# Patient Record
Sex: MALE | Race: WHITE | NOT HISPANIC OR LATINO
[De-identification: names, ages, dates, MRNs, and addresses within clinical notes are randomized per-mention and may not be internally consistent; named-entity substitution may affect disease eponyms.]

---

## 2017-01-26 ENCOUNTER — MEDICATION RENEWAL (OUTPATIENT)
Age: 72
End: 2017-01-26

## 2017-01-29 ENCOUNTER — MEDICATION RENEWAL (OUTPATIENT)
Age: 72
End: 2017-01-29

## 2017-02-01 ENCOUNTER — MEDICATION RENEWAL (OUTPATIENT)
Age: 72
End: 2017-02-01

## 2017-02-02 ENCOUNTER — MEDICATION RENEWAL (OUTPATIENT)
Age: 72
End: 2017-02-02

## 2017-02-03 ENCOUNTER — MEDICATION RENEWAL (OUTPATIENT)
Age: 72
End: 2017-02-03

## 2017-04-27 ENCOUNTER — APPOINTMENT (OUTPATIENT)
Dept: CARDIOLOGY | Facility: CLINIC | Age: 72
End: 2017-04-27

## 2017-05-05 ENCOUNTER — NON-APPOINTMENT (OUTPATIENT)
Age: 72
End: 2017-05-05

## 2017-05-05 ENCOUNTER — APPOINTMENT (OUTPATIENT)
Dept: INTERNAL MEDICINE | Facility: CLINIC | Age: 72
End: 2017-05-05

## 2017-05-05 VITALS
OXYGEN SATURATION: 98 % | WEIGHT: 180 LBS | HEIGHT: 69 IN | BODY MASS INDEX: 26.66 KG/M2 | DIASTOLIC BLOOD PRESSURE: 80 MMHG | SYSTOLIC BLOOD PRESSURE: 120 MMHG | TEMPERATURE: 98.2 F | HEART RATE: 43 BPM

## 2017-05-05 VITALS — DIASTOLIC BLOOD PRESSURE: 75 MMHG | SYSTOLIC BLOOD PRESSURE: 120 MMHG

## 2017-05-05 DIAGNOSIS — R07.89 OTHER CHEST PAIN: ICD-10-CM

## 2017-05-26 ENCOUNTER — RX RENEWAL (OUTPATIENT)
Age: 72
End: 2017-05-26

## 2017-06-01 ENCOUNTER — TRANSCRIPTION ENCOUNTER (OUTPATIENT)
Age: 72
End: 2017-06-01

## 2017-06-13 ENCOUNTER — APPOINTMENT (OUTPATIENT)
Dept: ENDOCRINOLOGY | Facility: CLINIC | Age: 72
End: 2017-06-13

## 2017-07-05 ENCOUNTER — APPOINTMENT (OUTPATIENT)
Dept: INTERNAL MEDICINE | Facility: CLINIC | Age: 72
End: 2017-07-05

## 2017-07-05 ENCOUNTER — APPOINTMENT (OUTPATIENT)
Dept: ORTHOPEDIC SURGERY | Facility: CLINIC | Age: 72
End: 2017-07-05

## 2017-07-05 VITALS
DIASTOLIC BLOOD PRESSURE: 73 MMHG | SYSTOLIC BLOOD PRESSURE: 116 MMHG | WEIGHT: 180 LBS | HEART RATE: 76 BPM | HEIGHT: 69 IN | BODY MASS INDEX: 26.66 KG/M2

## 2017-07-05 VITALS
HEART RATE: 55 BPM | OXYGEN SATURATION: 98 % | DIASTOLIC BLOOD PRESSURE: 66 MMHG | SYSTOLIC BLOOD PRESSURE: 112 MMHG | TEMPERATURE: 98.1 F

## 2017-07-05 VITALS
HEIGHT: 69 IN | SYSTOLIC BLOOD PRESSURE: 137 MMHG | WEIGHT: 180 LBS | BODY MASS INDEX: 26.66 KG/M2 | DIASTOLIC BLOOD PRESSURE: 80 MMHG | HEART RATE: 44 BPM

## 2017-07-05 DIAGNOSIS — S86.919A STRAIN OF UNSPECIFIED MUSCLE(S) AND TENDON(S) AT LOWER LEG LEVEL, UNSPECIFIED LEG, INITIAL ENCOUNTER: ICD-10-CM

## 2017-09-07 ENCOUNTER — APPOINTMENT (OUTPATIENT)
Dept: ORTHOPEDIC SURGERY | Facility: CLINIC | Age: 72
End: 2017-09-07
Payer: MEDICARE

## 2017-09-07 DIAGNOSIS — M25.561 PAIN IN RIGHT KNEE: ICD-10-CM

## 2017-09-07 PROCEDURE — 99213 OFFICE O/P EST LOW 20 MIN: CPT

## 2017-09-12 PROBLEM — M25.561 RIGHT KNEE PAIN: Status: ACTIVE | Noted: 2017-09-12

## 2017-09-28 ENCOUNTER — NON-APPOINTMENT (OUTPATIENT)
Age: 72
End: 2017-09-28

## 2017-09-28 ENCOUNTER — APPOINTMENT (OUTPATIENT)
Dept: INTERNAL MEDICINE | Facility: CLINIC | Age: 72
End: 2017-09-28
Payer: MEDICARE

## 2017-09-28 VITALS
OXYGEN SATURATION: 99 % | HEART RATE: 55 BPM | HEIGHT: 69 IN | WEIGHT: 183 LBS | SYSTOLIC BLOOD PRESSURE: 100 MMHG | DIASTOLIC BLOOD PRESSURE: 70 MMHG | TEMPERATURE: 98.2 F | BODY MASS INDEX: 27.11 KG/M2

## 2017-09-28 DIAGNOSIS — R29.3 ABNORMAL POSTURE: ICD-10-CM

## 2017-09-28 PROCEDURE — 93000 ELECTROCARDIOGRAM COMPLETE: CPT | Mod: 59

## 2017-09-28 PROCEDURE — 82270 OCCULT BLOOD FECES: CPT

## 2017-09-28 PROCEDURE — 90662 IIV NO PRSV INCREASED AG IM: CPT

## 2017-09-28 PROCEDURE — G0008: CPT

## 2017-09-28 PROCEDURE — G0439: CPT

## 2017-09-28 PROCEDURE — 36415 COLL VENOUS BLD VENIPUNCTURE: CPT

## 2017-10-01 LAB
25(OH)D3 SERPL-MCNC: 23.9 NG/ML
ALBUMIN SERPL ELPH-MCNC: 4 G/DL
ALP BLD-CCNC: 52 U/L
ALT SERPL-CCNC: 18 U/L
ANION GAP SERPL CALC-SCNC: 16 MMOL/L
APPEARANCE: CLEAR
AST SERPL-CCNC: 22 U/L
BACTERIA: NEGATIVE
BASOPHILS # BLD AUTO: 0.04 K/UL
BASOPHILS NFR BLD AUTO: 0.8 %
BILIRUB SERPL-MCNC: 0.5 MG/DL
BILIRUBIN URINE: NEGATIVE
BLOOD URINE: NEGATIVE
BUN SERPL-MCNC: 15 MG/DL
CALCIUM SERPL-MCNC: 9.2 MG/DL
CHLORIDE SERPL-SCNC: 100 MMOL/L
CHOLEST SERPL-MCNC: 147 MG/DL
CHOLEST/HDLC SERPL: 2.1 RATIO
CO2 SERPL-SCNC: 23 MMOL/L
COLOR: YELLOW
CREAT SERPL-MCNC: 1.11 MG/DL
EOSINOPHIL # BLD AUTO: 0.21 K/UL
EOSINOPHIL NFR BLD AUTO: 4.2 %
FERRITIN SERPL-MCNC: 19 NG/ML
GLUCOSE QUALITATIVE U: NORMAL MG/DL
GLUCOSE SERPL-MCNC: 89 MG/DL
HBA1C MFR BLD HPLC: 5.5 %
HCT VFR BLD CALC: 34.5 %
HDLC SERPL-MCNC: 70 MG/DL
HGB BLD-MCNC: 10.7 G/DL
HYALINE CASTS: 1 /LPF
IMM GRANULOCYTES NFR BLD AUTO: 0.4 %
IRON SATN MFR SERPL: 7 %
IRON SERPL-MCNC: 26 UG/DL
KETONES URINE: NEGATIVE
LDLC SERPL CALC-MCNC: 63 MG/DL
LEUKOCYTE ESTERASE URINE: NEGATIVE
LYMPHOCYTES # BLD AUTO: 1.08 K/UL
LYMPHOCYTES NFR BLD AUTO: 21.7 %
MAN DIFF?: NORMAL
MCHC RBC-ENTMCNC: 27.3 PG
MCHC RBC-ENTMCNC: 31 GM/DL
MCV RBC AUTO: 88 FL
MICROSCOPIC-UA: NORMAL
MONOCYTES # BLD AUTO: 0.59 K/UL
MONOCYTES NFR BLD AUTO: 11.9 %
NEUTROPHILS # BLD AUTO: 3.03 K/UL
NEUTROPHILS NFR BLD AUTO: 61 %
NITRITE URINE: NEGATIVE
PH URINE: 7
PLATELET # BLD AUTO: 291 K/UL
POTASSIUM SERPL-SCNC: 4.3 MMOL/L
PROT SERPL-MCNC: 6.9 G/DL
PROTEIN URINE: NEGATIVE MG/DL
PSA SERPL-MCNC: 0.31 NG/ML
RBC # BLD: 3.92 M/UL
RBC # FLD: 14.8 %
RED BLOOD CELLS URINE: 2 /HPF
SODIUM SERPL-SCNC: 139 MMOL/L
SPECIFIC GRAVITY URINE: 1.02
SQUAMOUS EPITHELIAL CELLS: 0 /HPF
T4 FREE SERPL-MCNC: 0.9 NG/DL
TIBC SERPL-MCNC: 348 UG/DL
TRIGL SERPL-MCNC: 68 MG/DL
TSH SERPL-ACNC: 2.21 UIU/ML
UIBC SERPL-MCNC: 322 UG/DL
UROBILINOGEN URINE: NORMAL MG/DL
WBC # FLD AUTO: 4.97 K/UL
WHITE BLOOD CELLS URINE: 0 /HPF

## 2017-10-06 ENCOUNTER — APPOINTMENT (OUTPATIENT)
Dept: ORTHOPEDIC SURGERY | Facility: CLINIC | Age: 72
End: 2017-10-06
Payer: MEDICARE

## 2017-10-06 PROCEDURE — 20610 DRAIN/INJ JOINT/BURSA W/O US: CPT | Mod: RT

## 2017-10-06 PROCEDURE — 99213 OFFICE O/P EST LOW 20 MIN: CPT | Mod: 25

## 2017-11-22 ENCOUNTER — APPOINTMENT (OUTPATIENT)
Dept: CARDIOLOGY | Facility: CLINIC | Age: 72
End: 2017-11-22

## 2017-12-04 ENCOUNTER — APPOINTMENT (OUTPATIENT)
Dept: CARDIOLOGY | Facility: CLINIC | Age: 72
End: 2017-12-04
Payer: MEDICARE

## 2017-12-04 VITALS
SYSTOLIC BLOOD PRESSURE: 113 MMHG | DIASTOLIC BLOOD PRESSURE: 64 MMHG | BODY MASS INDEX: 27.28 KG/M2 | WEIGHT: 184.2 LBS | TEMPERATURE: 98.2 F | HEART RATE: 52 BPM | HEIGHT: 69 IN | OXYGEN SATURATION: 99 %

## 2017-12-04 PROCEDURE — 99214 OFFICE O/P EST MOD 30 MIN: CPT

## 2017-12-18 ENCOUNTER — MEDICATION RENEWAL (OUTPATIENT)
Age: 72
End: 2017-12-18

## 2018-03-02 ENCOUNTER — MEDICATION RENEWAL (OUTPATIENT)
Age: 73
End: 2018-03-02

## 2018-04-13 ENCOUNTER — APPOINTMENT (OUTPATIENT)
Dept: INTERNAL MEDICINE | Facility: CLINIC | Age: 73
End: 2018-04-13

## 2018-04-24 ENCOUNTER — NON-APPOINTMENT (OUTPATIENT)
Age: 73
End: 2018-04-24

## 2018-04-24 ENCOUNTER — APPOINTMENT (OUTPATIENT)
Dept: CARDIOLOGY | Facility: CLINIC | Age: 73
End: 2018-04-24
Payer: MEDICARE

## 2018-04-24 VITALS
BODY MASS INDEX: 28.29 KG/M2 | HEIGHT: 69 IN | DIASTOLIC BLOOD PRESSURE: 57 MMHG | SYSTOLIC BLOOD PRESSURE: 107 MMHG | TEMPERATURE: 98 F | WEIGHT: 191 LBS | HEART RATE: 61 BPM | OXYGEN SATURATION: 100 %

## 2018-04-24 PROCEDURE — 99215 OFFICE O/P EST HI 40 MIN: CPT

## 2018-04-24 PROCEDURE — 93000 ELECTROCARDIOGRAM COMPLETE: CPT

## 2018-04-24 PROCEDURE — 36415 COLL VENOUS BLD VENIPUNCTURE: CPT

## 2018-04-25 ENCOUNTER — APPOINTMENT (OUTPATIENT)
Dept: CARDIOLOGY | Facility: CLINIC | Age: 73
End: 2018-04-25
Payer: MEDICARE

## 2018-04-25 LAB
ALBUMIN SERPL ELPH-MCNC: 3.8 G/DL
ALP BLD-CCNC: 53 U/L
ALT SERPL-CCNC: 9 U/L
ANION GAP SERPL CALC-SCNC: 19 MMOL/L
AST SERPL-CCNC: 16 U/L
BASOPHILS # BLD AUTO: 0.02 K/UL
BASOPHILS NFR BLD AUTO: 0.4 %
BILIRUB SERPL-MCNC: 0.2 MG/DL
BUN SERPL-MCNC: 20 MG/DL
CALCIUM SERPL-MCNC: 9 MG/DL
CHLORIDE SERPL-SCNC: 101 MMOL/L
CHOLEST SERPL-MCNC: 139 MG/DL
CHOLEST/HDLC SERPL: 2.2 RATIO
CO2 SERPL-SCNC: 19 MMOL/L
CREAT SERPL-MCNC: 1.26 MG/DL
EOSINOPHIL # BLD AUTO: 0.12 K/UL
EOSINOPHIL NFR BLD AUTO: 2.3 %
FERRITIN SERPL-MCNC: 6 NG/ML
GLUCOSE SERPL-MCNC: 110 MG/DL
HBA1C MFR BLD HPLC: 5.8 %
HCT VFR BLD CALC: 22.6 %
HDLC SERPL-MCNC: 62 MG/DL
HGB BLD-MCNC: 6.8 G/DL
IMM GRANULOCYTES NFR BLD AUTO: 0.6 %
IRON SATN MFR SERPL: 4 %
IRON SERPL-MCNC: 15 UG/DL
LDLC SERPL CALC-MCNC: 58 MG/DL
LYMPHOCYTES # BLD AUTO: 1.42 K/UL
LYMPHOCYTES NFR BLD AUTO: 26.7 %
MAN DIFF?: NORMAL
MCHC RBC-ENTMCNC: 23.6 PG
MCHC RBC-ENTMCNC: 30.1 GM/DL
MCV RBC AUTO: 78.5 FL
MONOCYTES # BLD AUTO: 0.57 K/UL
MONOCYTES NFR BLD AUTO: 10.7 %
NEUTROPHILS # BLD AUTO: 3.15 K/UL
NEUTROPHILS NFR BLD AUTO: 59.3 %
NT-PROBNP SERPL-MCNC: 232 PG/ML
PLATELET # BLD AUTO: 336 K/UL
POTASSIUM SERPL-SCNC: 4.3 MMOL/L
PROT SERPL-MCNC: 6.4 G/DL
RBC # BLD: 2.88 M/UL
RBC # FLD: 15.5 %
SODIUM SERPL-SCNC: 139 MMOL/L
TIBC SERPL-MCNC: 354 UG/DL
TRIGL SERPL-MCNC: 95 MG/DL
TSH SERPL-ACNC: 2 UIU/ML
UIBC SERPL-MCNC: 339 UG/DL
WBC # FLD AUTO: 5.31 K/UL

## 2018-04-25 PROCEDURE — 99212 OFFICE O/P EST SF 10 MIN: CPT

## 2018-04-26 ENCOUNTER — APPOINTMENT (OUTPATIENT)
Dept: INTERNAL MEDICINE | Facility: CLINIC | Age: 73
End: 2018-04-26
Payer: MEDICARE

## 2018-04-26 ENCOUNTER — APPOINTMENT (OUTPATIENT)
Dept: VASCULAR SURGERY | Facility: CLINIC | Age: 73
End: 2018-04-26
Payer: MEDICARE

## 2018-04-26 VITALS
BODY MASS INDEX: 26.66 KG/M2 | TEMPERATURE: 97.5 F | OXYGEN SATURATION: 100 % | SYSTOLIC BLOOD PRESSURE: 110 MMHG | HEART RATE: 54 BPM | WEIGHT: 180 LBS | DIASTOLIC BLOOD PRESSURE: 60 MMHG | HEIGHT: 69 IN

## 2018-04-26 VITALS
TEMPERATURE: 97.9 F | DIASTOLIC BLOOD PRESSURE: 61 MMHG | HEIGHT: 69 IN | WEIGHT: 180 LBS | BODY MASS INDEX: 26.66 KG/M2 | SYSTOLIC BLOOD PRESSURE: 99 MMHG | HEART RATE: 48 BPM

## 2018-04-26 VITALS — SYSTOLIC BLOOD PRESSURE: 108 MMHG | HEART RATE: 47 BPM | DIASTOLIC BLOOD PRESSURE: 64 MMHG

## 2018-04-26 VITALS — SYSTOLIC BLOOD PRESSURE: 110 MMHG | HEART RATE: 50 BPM | DIASTOLIC BLOOD PRESSURE: 65 MMHG

## 2018-04-26 PROCEDURE — 93970 EXTREMITY STUDY: CPT

## 2018-04-26 PROCEDURE — 99203 OFFICE O/P NEW LOW 30 MIN: CPT

## 2018-04-26 PROCEDURE — 99214 OFFICE O/P EST MOD 30 MIN: CPT

## 2018-04-30 ENCOUNTER — OUTPATIENT (OUTPATIENT)
Dept: OUTPATIENT SERVICES | Facility: HOSPITAL | Age: 73
LOS: 1 days | Discharge: HOME | End: 2018-04-30

## 2018-04-30 DIAGNOSIS — Z87.09 PERSONAL HISTORY OF OTHER DISEASES OF THE RESPIRATORY SYSTEM: Chronic | ICD-10-CM

## 2018-04-30 DIAGNOSIS — D64.9 ANEMIA, UNSPECIFIED: ICD-10-CM

## 2018-05-02 ENCOUNTER — RESULT REVIEW (OUTPATIENT)
Age: 73
End: 2018-05-02

## 2018-05-02 ENCOUNTER — APPOINTMENT (OUTPATIENT)
Dept: HEMATOLOGY ONCOLOGY | Facility: CLINIC | Age: 73
End: 2018-05-02
Payer: MEDICARE

## 2018-05-02 VITALS
OXYGEN SATURATION: 99 % | SYSTOLIC BLOOD PRESSURE: 112 MMHG | HEIGHT: 69.37 IN | DIASTOLIC BLOOD PRESSURE: 70 MMHG | BODY MASS INDEX: 26.36 KG/M2 | WEIGHT: 179.99 LBS | HEART RATE: 60 BPM | RESPIRATION RATE: 16 BRPM | TEMPERATURE: 98.3 F

## 2018-05-02 DIAGNOSIS — Z82.49 FAMILY HISTORY OF ISCHEMIC HEART DISEASE AND OTHER DISEASES OF THE CIRCULATORY SYSTEM: ICD-10-CM

## 2018-05-02 DIAGNOSIS — Z83.3 FAMILY HISTORY OF DIABETES MELLITUS: ICD-10-CM

## 2018-05-02 LAB
HCT VFR BLD CALC: 24.8 % — LOW (ref 39–50)
HGB BLD-MCNC: 8.2 G/DL — LOW (ref 13–17)
MCHC RBC-ENTMCNC: 25.6 PG — LOW (ref 27–34)
MCHC RBC-ENTMCNC: 32.9 G/DL — SIGNIFICANT CHANGE UP (ref 32–36)
MCV RBC AUTO: 77.8 FL — LOW (ref 80–100)
PLATELET # BLD AUTO: 269 K/UL — SIGNIFICANT CHANGE UP (ref 150–400)
RBC # BLD: 3.18 M/UL — LOW (ref 4.2–5.8)
RBC # FLD: 18.3 % — HIGH (ref 10.3–14.5)
WBC # BLD: 5.7 K/UL — SIGNIFICANT CHANGE UP (ref 3.8–10.5)
WBC # FLD AUTO: 5.7 K/UL — SIGNIFICANT CHANGE UP (ref 3.8–10.5)

## 2018-05-02 PROCEDURE — 99205 OFFICE O/P NEW HI 60 MIN: CPT

## 2018-05-03 LAB
HAV IGM SER QL: NONREACTIVE
HBV CORE IGM SER QL: NONREACTIVE
HBV SURFACE AG SER QL: NONREACTIVE
HCV AB SER QL: NONREACTIVE
HCV S/CO RATIO: 0.21 S/CO

## 2018-05-04 ENCOUNTER — APPOINTMENT (OUTPATIENT)
Dept: INFUSION THERAPY | Facility: HOSPITAL | Age: 73
End: 2018-05-04

## 2018-05-04 ENCOUNTER — APPOINTMENT (OUTPATIENT)
Dept: INTERNAL MEDICINE | Facility: CLINIC | Age: 73
End: 2018-05-04
Payer: MEDICARE

## 2018-05-04 VITALS
SYSTOLIC BLOOD PRESSURE: 130 MMHG | OXYGEN SATURATION: 99 % | DIASTOLIC BLOOD PRESSURE: 70 MMHG | HEIGHT: 70 IN | HEART RATE: 55 BPM | BODY MASS INDEX: 25.91 KG/M2 | WEIGHT: 181 LBS | TEMPERATURE: 98.2 F

## 2018-05-04 VITALS — DIASTOLIC BLOOD PRESSURE: 65 MMHG | SYSTOLIC BLOOD PRESSURE: 105 MMHG

## 2018-05-04 PROCEDURE — 99213 OFFICE O/P EST LOW 20 MIN: CPT

## 2018-05-07 DIAGNOSIS — R11.2 NAUSEA WITH VOMITING, UNSPECIFIED: ICD-10-CM

## 2018-05-07 DIAGNOSIS — D50.9 IRON DEFICIENCY ANEMIA, UNSPECIFIED: ICD-10-CM

## 2018-05-10 ENCOUNTER — APPOINTMENT (OUTPATIENT)
Dept: INFUSION THERAPY | Facility: HOSPITAL | Age: 73
End: 2018-05-10

## 2018-05-14 ENCOUNTER — RESULT REVIEW (OUTPATIENT)
Age: 73
End: 2018-05-14

## 2018-05-15 ENCOUNTER — RESULT REVIEW (OUTPATIENT)
Age: 73
End: 2018-05-15

## 2018-05-16 ENCOUNTER — RESULT REVIEW (OUTPATIENT)
Age: 73
End: 2018-05-16

## 2018-05-17 ENCOUNTER — APPOINTMENT (OUTPATIENT)
Dept: INFUSION THERAPY | Facility: HOSPITAL | Age: 73
End: 2018-05-17

## 2018-05-17 LAB
OB PNL STL: NEGATIVE — SIGNIFICANT CHANGE UP

## 2018-05-23 ENCOUNTER — RESULT REVIEW (OUTPATIENT)
Age: 73
End: 2018-05-23

## 2018-05-23 ENCOUNTER — APPOINTMENT (OUTPATIENT)
Dept: HEMATOLOGY ONCOLOGY | Facility: CLINIC | Age: 73
End: 2018-05-23
Payer: MEDICARE

## 2018-05-23 VITALS
RESPIRATION RATE: 16 BRPM | TEMPERATURE: 98.1 F | WEIGHT: 182.98 LBS | HEART RATE: 59 BPM | SYSTOLIC BLOOD PRESSURE: 110 MMHG | OXYGEN SATURATION: 99 % | BODY MASS INDEX: 26.26 KG/M2 | DIASTOLIC BLOOD PRESSURE: 70 MMHG

## 2018-05-23 LAB
ALBUMIN SERPL ELPH-MCNC: 4.2 G/DL
ALP BLD-CCNC: 72 U/L
ALT SERPL-CCNC: 29 U/L
ANION GAP SERPL CALC-SCNC: 12 MMOL/L
AST SERPL-CCNC: 25 U/L
BASOPHILS # BLD AUTO: 0 K/UL — SIGNIFICANT CHANGE UP (ref 0–0.2)
BASOPHILS NFR BLD AUTO: 0.4 % — SIGNIFICANT CHANGE UP (ref 0–2)
BILIRUB SERPL-MCNC: 0.3 MG/DL
BUN SERPL-MCNC: 16 MG/DL
CALCIUM SERPL-MCNC: 9 MG/DL
CHLORIDE SERPL-SCNC: 103 MMOL/L
CO2 SERPL-SCNC: 25 MMOL/L
CREAT SERPL-MCNC: 0.96 MG/DL
EOSINOPHIL # BLD AUTO: 0.1 K/UL — SIGNIFICANT CHANGE UP (ref 0–0.5)
EOSINOPHIL NFR BLD AUTO: 1.6 % — SIGNIFICANT CHANGE UP (ref 0–6)
FERRITIN SERPL-MCNC: 250 NG/ML
GLUCOSE SERPL-MCNC: 116 MG/DL
HCT VFR BLD CALC: 35.6 % — LOW (ref 39–50)
HGB BLD-MCNC: 11.8 G/DL — LOW (ref 13–17)
IRON SATN MFR SERPL: 23 %
IRON SERPL-MCNC: 61 UG/DL
LYMPHOCYTES # BLD AUTO: 0.8 K/UL — LOW (ref 1–3.3)
LYMPHOCYTES # BLD AUTO: 17.4 % — SIGNIFICANT CHANGE UP (ref 13–44)
MCHC RBC-ENTMCNC: 27.4 PG — SIGNIFICANT CHANGE UP (ref 27–34)
MCHC RBC-ENTMCNC: 33 G/DL — SIGNIFICANT CHANGE UP (ref 32–36)
MCV RBC AUTO: 83.1 FL — SIGNIFICANT CHANGE UP (ref 80–100)
MONOCYTES # BLD AUTO: 0.3 K/UL — SIGNIFICANT CHANGE UP (ref 0–0.9)
MONOCYTES NFR BLD AUTO: 7.4 % — SIGNIFICANT CHANGE UP (ref 2–14)
NEUTROPHILS # BLD AUTO: 3.3 K/UL — SIGNIFICANT CHANGE UP (ref 1.8–7.4)
NEUTROPHILS NFR BLD AUTO: 73.2 % — SIGNIFICANT CHANGE UP (ref 43–77)
PLATELET # BLD AUTO: 271 K/UL — SIGNIFICANT CHANGE UP (ref 150–400)
POTASSIUM SERPL-SCNC: 4 MMOL/L
PROT SERPL-MCNC: 6.6 G/DL
RBC # BLD: 4.29 M/UL — SIGNIFICANT CHANGE UP (ref 4.2–5.8)
RBC # FLD: 19.5 % — HIGH (ref 10.3–14.5)
SODIUM SERPL-SCNC: 140 MMOL/L
TIBC SERPL-MCNC: 266 UG/DL
UIBC SERPL-MCNC: 205 UG/DL
WBC # BLD: 4.6 K/UL — SIGNIFICANT CHANGE UP (ref 3.8–10.5)
WBC # FLD AUTO: 4.6 K/UL — SIGNIFICANT CHANGE UP (ref 3.8–10.5)

## 2018-05-23 PROCEDURE — 99215 OFFICE O/P EST HI 40 MIN: CPT

## 2018-05-23 RX ORDER — TRAMADOL HYDROCHLORIDE 50 MG/1
50 TABLET, COATED ORAL
Qty: 30 | Refills: 0 | Status: DISCONTINUED | COMMUNITY
Start: 2018-01-10 | End: 2018-05-23

## 2018-05-23 RX ORDER — PANTOPRAZOLE 40 MG/1
40 TABLET, DELAYED RELEASE ORAL DAILY
Qty: 90 | Refills: 3 | Status: DISCONTINUED | COMMUNITY
Start: 2018-04-26 | End: 2018-05-23

## 2018-05-25 ENCOUNTER — APPOINTMENT (OUTPATIENT)
Dept: INTERNAL MEDICINE | Facility: CLINIC | Age: 73
End: 2018-05-25

## 2018-06-01 ENCOUNTER — RX RENEWAL (OUTPATIENT)
Age: 73
End: 2018-06-01

## 2018-06-07 ENCOUNTER — NON-APPOINTMENT (OUTPATIENT)
Age: 73
End: 2018-06-07

## 2018-06-07 ENCOUNTER — APPOINTMENT (OUTPATIENT)
Dept: CARDIOLOGY | Facility: CLINIC | Age: 73
End: 2018-06-07
Payer: MEDICARE

## 2018-06-07 ENCOUNTER — LABORATORY RESULT (OUTPATIENT)
Age: 73
End: 2018-06-07

## 2018-06-07 ENCOUNTER — RX RENEWAL (OUTPATIENT)
Age: 73
End: 2018-06-07

## 2018-06-07 VITALS
DIASTOLIC BLOOD PRESSURE: 68 MMHG | BODY MASS INDEX: 26.47 KG/M2 | OXYGEN SATURATION: 100 % | SYSTOLIC BLOOD PRESSURE: 110 MMHG | WEIGHT: 184.5 LBS | HEART RATE: 49 BPM

## 2018-06-07 PROCEDURE — 93000 ELECTROCARDIOGRAM COMPLETE: CPT

## 2018-06-07 PROCEDURE — 99214 OFFICE O/P EST MOD 30 MIN: CPT

## 2018-06-07 PROCEDURE — 36415 COLL VENOUS BLD VENIPUNCTURE: CPT

## 2018-06-08 ENCOUNTER — MEDICATION RENEWAL (OUTPATIENT)
Age: 73
End: 2018-06-08

## 2018-06-10 LAB
ALBUMIN SERPL ELPH-MCNC: 4.1 G/DL
ALP BLD-CCNC: 63 U/L
ALT SERPL-CCNC: 21 U/L
ANION GAP SERPL CALC-SCNC: 7 MMOL/L
AST SERPL-CCNC: 22 U/L
BASOPHILS # BLD AUTO: 0.03 K/UL
BASOPHILS NFR BLD AUTO: 0.5 %
BILIRUB SERPL-MCNC: 0.3 MG/DL
BUN SERPL-MCNC: 18 MG/DL
CALCIUM SERPL-MCNC: 9.2 MG/DL
CHLORIDE SERPL-SCNC: 103 MMOL/L
CO2 SERPL-SCNC: 30 MMOL/L
CREAT SERPL-MCNC: 1.13 MG/DL
EOSINOPHIL # BLD AUTO: 0.21 K/UL
EOSINOPHIL NFR BLD AUTO: 3.4 %
GLUCOSE SERPL-MCNC: 190 MG/DL
HCT VFR BLD CALC: 37.9 %
HGB BLD-MCNC: 11.7 G/DL
IMM GRANULOCYTES NFR BLD AUTO: 0.3 %
LYMPHOCYTES # BLD AUTO: 1.36 K/UL
LYMPHOCYTES NFR BLD AUTO: 22.3 %
MAN DIFF?: NORMAL
MCHC RBC-ENTMCNC: 25.7 PG
MCHC RBC-ENTMCNC: 30.9 GM/DL
MCV RBC AUTO: 83.3 FL
MONOCYTES # BLD AUTO: 0.4 K/UL
MONOCYTES NFR BLD AUTO: 6.5 %
NEUTROPHILS # BLD AUTO: 4.09 K/UL
NEUTROPHILS NFR BLD AUTO: 67 %
NT-PROBNP SERPL-MCNC: 140 PG/ML
PLATELET # BLD AUTO: 295 K/UL
POTASSIUM SERPL-SCNC: 4.2 MMOL/L
PROT SERPL-MCNC: 7 G/DL
RBC # BLD: 4.55 M/UL
RBC # FLD: 20.4 %
SODIUM SERPL-SCNC: 140 MMOL/L
WBC # FLD AUTO: 6.11 K/UL

## 2018-07-31 ENCOUNTER — OUTPATIENT (OUTPATIENT)
Dept: OUTPATIENT SERVICES | Facility: HOSPITAL | Age: 73
LOS: 1 days | Discharge: HOME | End: 2018-07-31

## 2018-07-31 DIAGNOSIS — D64.9 ANEMIA, UNSPECIFIED: ICD-10-CM

## 2018-07-31 DIAGNOSIS — Z87.09 PERSONAL HISTORY OF OTHER DISEASES OF THE RESPIRATORY SYSTEM: Chronic | ICD-10-CM

## 2018-08-02 ENCOUNTER — APPOINTMENT (OUTPATIENT)
Dept: VASCULAR SURGERY | Facility: CLINIC | Age: 73
End: 2018-08-02
Payer: MEDICARE

## 2018-08-02 VITALS
WEIGHT: 180 LBS | TEMPERATURE: 98.2 F | DIASTOLIC BLOOD PRESSURE: 67 MMHG | HEIGHT: 70 IN | SYSTOLIC BLOOD PRESSURE: 106 MMHG | HEART RATE: 57 BPM | BODY MASS INDEX: 25.77 KG/M2

## 2018-08-02 VITALS — DIASTOLIC BLOOD PRESSURE: 66 MMHG | HEART RATE: 54 BPM | SYSTOLIC BLOOD PRESSURE: 106 MMHG

## 2018-08-02 PROCEDURE — 99213 OFFICE O/P EST LOW 20 MIN: CPT

## 2018-08-06 ENCOUNTER — TRANSCRIPTION ENCOUNTER (OUTPATIENT)
Age: 73
End: 2018-08-06

## 2018-08-08 ENCOUNTER — APPOINTMENT (OUTPATIENT)
Dept: HEMATOLOGY ONCOLOGY | Facility: CLINIC | Age: 73
End: 2018-08-08
Payer: MEDICARE

## 2018-08-08 ENCOUNTER — RESULT REVIEW (OUTPATIENT)
Age: 73
End: 2018-08-08

## 2018-08-08 LAB
HCT VFR BLD CALC: 31.7 % — LOW (ref 39–50)
HGB BLD-MCNC: 11 G/DL — LOW (ref 13–17)
MCHC RBC-ENTMCNC: 29.6 PG — SIGNIFICANT CHANGE UP (ref 27–34)
MCHC RBC-ENTMCNC: 34.5 G/DL — SIGNIFICANT CHANGE UP (ref 32–36)
MCV RBC AUTO: 85.7 FL — SIGNIFICANT CHANGE UP (ref 80–100)
PLATELET # BLD AUTO: 245 K/UL — SIGNIFICANT CHANGE UP (ref 150–400)
RBC # BLD: 3.7 M/UL — LOW (ref 4.2–5.8)
RBC # FLD: 16.3 % — HIGH (ref 10.3–14.5)
WBC # BLD: 10 K/UL — SIGNIFICANT CHANGE UP (ref 3.8–10.5)
WBC # FLD AUTO: 10 K/UL — SIGNIFICANT CHANGE UP (ref 3.8–10.5)

## 2018-08-08 PROCEDURE — 99215 OFFICE O/P EST HI 40 MIN: CPT

## 2018-08-09 LAB
ALBUMIN SERPL ELPH-MCNC: 4 G/DL
ALP BLD-CCNC: 69 U/L
ALT SERPL-CCNC: 20 U/L
ANION GAP SERPL CALC-SCNC: 14 MMOL/L
AST SERPL-CCNC: 14 U/L
BILIRUB SERPL-MCNC: 0.3 MG/DL
BUN SERPL-MCNC: 16 MG/DL
CALCIUM SERPL-MCNC: 8.9 MG/DL
CHLORIDE SERPL-SCNC: 99 MMOL/L
CO2 SERPL-SCNC: 22 MMOL/L
CREAT SERPL-MCNC: 1 MG/DL
FERRITIN SERPL-MCNC: 26 NG/ML
GLUCOSE SERPL-MCNC: 274 MG/DL
IRON SATN MFR SERPL: 16 %
IRON SERPL-MCNC: 51 UG/DL
POTASSIUM SERPL-SCNC: 4.4 MMOL/L
PROT SERPL-MCNC: 6.5 G/DL
SODIUM SERPL-SCNC: 135 MMOL/L
TIBC SERPL-MCNC: 310 UG/DL
UIBC SERPL-MCNC: 259 UG/DL

## 2018-09-19 ENCOUNTER — RX RENEWAL (OUTPATIENT)
Age: 73
End: 2018-09-19

## 2018-09-27 ENCOUNTER — OUTPATIENT (OUTPATIENT)
Dept: OUTPATIENT SERVICES | Facility: HOSPITAL | Age: 73
LOS: 1 days | Discharge: HOME | End: 2018-09-27

## 2018-09-27 DIAGNOSIS — D64.9 ANEMIA, UNSPECIFIED: ICD-10-CM

## 2018-09-27 DIAGNOSIS — Z87.09 PERSONAL HISTORY OF OTHER DISEASES OF THE RESPIRATORY SYSTEM: Chronic | ICD-10-CM

## 2018-10-05 ENCOUNTER — APPOINTMENT (OUTPATIENT)
Dept: HEMATOLOGY ONCOLOGY | Facility: CLINIC | Age: 73
End: 2018-10-05
Payer: MEDICARE

## 2018-10-05 ENCOUNTER — RESULT REVIEW (OUTPATIENT)
Age: 73
End: 2018-10-05

## 2018-10-05 VITALS
RESPIRATION RATE: 16 BRPM | OXYGEN SATURATION: 100 % | TEMPERATURE: 97.8 F | WEIGHT: 188.27 LBS | SYSTOLIC BLOOD PRESSURE: 138 MMHG | DIASTOLIC BLOOD PRESSURE: 80 MMHG | BODY MASS INDEX: 27.01 KG/M2 | HEART RATE: 42 BPM

## 2018-10-05 LAB
HCT VFR BLD CALC: 39 % — SIGNIFICANT CHANGE UP (ref 39–50)
HGB BLD-MCNC: 13.1 G/DL — SIGNIFICANT CHANGE UP (ref 13–17)
MCHC RBC-ENTMCNC: 30.5 PG — SIGNIFICANT CHANGE UP (ref 27–34)
MCHC RBC-ENTMCNC: 33.5 G/DL — SIGNIFICANT CHANGE UP (ref 32–36)
MCV RBC AUTO: 91.1 FL — SIGNIFICANT CHANGE UP (ref 80–100)
PLATELET # BLD AUTO: 214 K/UL — SIGNIFICANT CHANGE UP (ref 150–400)
RBC # BLD: 4.28 M/UL — SIGNIFICANT CHANGE UP (ref 4.2–5.8)
RBC # FLD: 11.8 % — SIGNIFICANT CHANGE UP (ref 10.3–14.5)
WBC # BLD: 4.8 K/UL — SIGNIFICANT CHANGE UP (ref 3.8–10.5)
WBC # FLD AUTO: 4.8 K/UL — SIGNIFICANT CHANGE UP (ref 3.8–10.5)

## 2018-10-05 PROCEDURE — 99214 OFFICE O/P EST MOD 30 MIN: CPT

## 2018-10-05 RX ORDER — ASPIRIN ENTERIC COATED TABLETS 81 MG 81 MG/1
81 TABLET, DELAYED RELEASE ORAL DAILY
Qty: 90 | Refills: 1 | Status: DISCONTINUED | COMMUNITY
Start: 2018-04-28 | End: 2018-10-05

## 2018-10-08 LAB
ALBUMIN SERPL ELPH-MCNC: 4.2 G/DL
ALP BLD-CCNC: 65 U/L
ALT SERPL-CCNC: 20 U/L
ANION GAP SERPL CALC-SCNC: 11 MMOL/L
AST SERPL-CCNC: 18 U/L
BILIRUB SERPL-MCNC: 0.4 MG/DL
BUN SERPL-MCNC: 19 MG/DL
CALCIUM SERPL-MCNC: 9.1 MG/DL
CHLORIDE SERPL-SCNC: 102 MMOL/L
CO2 SERPL-SCNC: 26 MMOL/L
CREAT SERPL-MCNC: 1.12 MG/DL
FERRITIN SERPL-MCNC: 25 NG/ML
GLUCOSE SERPL-MCNC: 109 MG/DL
IRON SATN MFR SERPL: 23 %
IRON SERPL-MCNC: 75 UG/DL
POTASSIUM SERPL-SCNC: 4.6 MMOL/L
PROT SERPL-MCNC: 6.2 G/DL
SODIUM SERPL-SCNC: 139 MMOL/L
TIBC SERPL-MCNC: 322 UG/DL
UIBC SERPL-MCNC: 247 UG/DL

## 2018-10-09 ENCOUNTER — NON-APPOINTMENT (OUTPATIENT)
Age: 73
End: 2018-10-09

## 2018-10-09 ENCOUNTER — APPOINTMENT (OUTPATIENT)
Dept: CARDIOLOGY | Facility: CLINIC | Age: 73
End: 2018-10-09
Payer: MEDICARE

## 2018-10-09 VITALS
SYSTOLIC BLOOD PRESSURE: 110 MMHG | HEART RATE: 46 BPM | DIASTOLIC BLOOD PRESSURE: 74 MMHG | OXYGEN SATURATION: 100 % | TEMPERATURE: 97.9 F | HEIGHT: 70 IN | WEIGHT: 189 LBS | BODY MASS INDEX: 27.06 KG/M2

## 2018-10-09 PROCEDURE — 99214 OFFICE O/P EST MOD 30 MIN: CPT

## 2018-10-09 PROCEDURE — 93000 ELECTROCARDIOGRAM COMPLETE: CPT

## 2018-10-30 ENCOUNTER — APPOINTMENT (OUTPATIENT)
Dept: INTERNAL MEDICINE | Facility: CLINIC | Age: 73
End: 2018-10-30
Payer: MEDICARE

## 2018-10-30 VITALS
DIASTOLIC BLOOD PRESSURE: 68 MMHG | HEIGHT: 70 IN | HEART RATE: 47 BPM | WEIGHT: 186 LBS | OXYGEN SATURATION: 99 % | BODY MASS INDEX: 26.63 KG/M2 | TEMPERATURE: 97.4 F | SYSTOLIC BLOOD PRESSURE: 110 MMHG

## 2018-10-30 DIAGNOSIS — M54.2 CERVICALGIA: ICD-10-CM

## 2018-10-30 DIAGNOSIS — Z23 ENCOUNTER FOR IMMUNIZATION: ICD-10-CM

## 2018-10-30 PROCEDURE — 36415 COLL VENOUS BLD VENIPUNCTURE: CPT

## 2018-10-30 PROCEDURE — G0439: CPT

## 2018-10-30 PROCEDURE — G0444 DEPRESSION SCREEN ANNUAL: CPT | Mod: 59

## 2018-10-30 PROCEDURE — G0008: CPT

## 2018-10-30 PROCEDURE — 90662 IIV NO PRSV INCREASED AG IM: CPT

## 2018-11-12 VITALS — DIASTOLIC BLOOD PRESSURE: 65 MMHG | HEART RATE: 44 BPM | SYSTOLIC BLOOD PRESSURE: 115 MMHG

## 2018-11-12 LAB
APPEARANCE: CLEAR
BACTERIA: NEGATIVE
BILIRUBIN URINE: NEGATIVE
BLOOD URINE: NEGATIVE
CHOLEST SERPL-MCNC: 164 MG/DL
CHOLEST/HDLC SERPL: 2.5 RATIO
COLOR: YELLOW
GLUCOSE QUALITATIVE U: NEGATIVE MG/DL
HBA1C MFR BLD HPLC: 5.8 %
HCV AB SER QL: NONREACTIVE
HCV S/CO RATIO: 0.24 S/CO
HDLC SERPL-MCNC: 66 MG/DL
HYALINE CASTS: 0 /LPF
KETONES URINE: NEGATIVE
LDLC SERPL CALC-MCNC: 79 MG/DL
LEUKOCYTE ESTERASE URINE: NEGATIVE
MICROSCOPIC-UA: NORMAL
NITRITE URINE: NEGATIVE
PH URINE: 7
PROTEIN URINE: NEGATIVE MG/DL
PSA SERPL-MCNC: 0.34 NG/ML
RED BLOOD CELLS URINE: 2 /HPF
SPECIFIC GRAVITY URINE: 1.02
SQUAMOUS EPITHELIAL CELLS: 0 /HPF
T4 FREE SERPL-MCNC: 0.9 NG/DL
TRIGL SERPL-MCNC: 94 MG/DL
TSH SERPL-ACNC: 2.71 UIU/ML
UROBILINOGEN URINE: NEGATIVE MG/DL
WHITE BLOOD CELLS URINE: 0 /HPF

## 2018-11-12 NOTE — PHYSICAL EXAM

## 2018-11-12 NOTE — HISTORY OF PRESENT ILLNESS
[FreeTextEntry1] : The patient is here for a routine visit.  [de-identified] : The patient is now seeing Dr. Wills and he has had parenteral iron.  He is returning every 3-4 months.  He is feeling better and stronger.\par \par He saw Dr. Celis recently.  He is bradycardic but stable.  No chest pain or dyspnea.  \par \par He does PT twice per week for his right knee and he finds it helpful.  He does some exercise.  \par \par He has nocturia 2-3 times per night. No gross hematuria or dysuria.

## 2018-11-12 NOTE — HEALTH RISK ASSESSMENT
[No falls in past year] : Patient reported no falls in the past year [0] : 2) Feeling down, depressed, or hopeless: Not at all (0) [Fully functional (bathing, dressing, toileting, transferring, walking, feeding)] : Fully functional (bathing, dressing, toileting, transferring, walking, feeding) [Fully functional (using the telephone, shopping, preparing meals, housekeeping, doing laundry, using] : Fully functional and needs no help or supervision to perform IADLs (using the telephone, shopping, preparing meals, housekeeping, doing laundry, using transportation, managing medications and managing finances) [] : No [[id number]] : 0 [Change in mental status noted] : No change in mental status noted [Reports changes in hearing] : Reports no changes in hearing [Reports changes in vision] : Reports no changes in vision [Reports changes in dental health] : Reports no changes in dental health

## 2018-11-12 NOTE — ASSESSMENT
[FreeTextEntry1] : He has had iron deficiency anemia now improved after receiving parenteral iron.  H/H WNL 10/5/18.  He is taking ferrous sulfate BID now and tolerating it.  He will follow-up with Dr. Wills.  \par \par He reports he had a colonoscopy and EGD by Dr. Torrez in the last few months.  We'll get the reports.  Dr. Torrez advised a capsule endoscopy and I told the patient I agree.  He is not on a PPI now.  He is off the ASA.  \par \par He should follow-up routinely with Dr. Lombardi because of the thyroid nodule.  \par \par Flu vaccine today.  He declines Shingrix for now.  S/p Prevnar.  \par \par He sees an ophthalmologist and dermatologist.  \par \par He is bradycardic which is chronic and stable.  \par \par Check a PSA.\par \par Check lipids on Atorvastatin.  Discussed diet and exercise.  \par \par He likely has mild BPH symptoms.  MOnitor for now.

## 2018-12-03 ENCOUNTER — OUTPATIENT (OUTPATIENT)
Dept: OUTPATIENT SERVICES | Facility: HOSPITAL | Age: 73
LOS: 1 days | Discharge: HOME | End: 2018-12-03

## 2018-12-03 DIAGNOSIS — Z87.09 PERSONAL HISTORY OF OTHER DISEASES OF THE RESPIRATORY SYSTEM: Chronic | ICD-10-CM

## 2018-12-03 DIAGNOSIS — D64.9 ANEMIA, UNSPECIFIED: ICD-10-CM

## 2018-12-04 ENCOUNTER — APPOINTMENT (OUTPATIENT)
Dept: HEMATOLOGY ONCOLOGY | Facility: CLINIC | Age: 73
End: 2018-12-04

## 2018-12-04 ENCOUNTER — RESULT REVIEW (OUTPATIENT)
Age: 73
End: 2018-12-04

## 2018-12-04 LAB
HCT VFR BLD CALC: 38.8 % — LOW (ref 39–50)
HGB BLD-MCNC: 13 G/DL — SIGNIFICANT CHANGE UP (ref 13–17)
MCHC RBC-ENTMCNC: 29.8 PG — SIGNIFICANT CHANGE UP (ref 27–34)
MCHC RBC-ENTMCNC: 33.5 G/DL — SIGNIFICANT CHANGE UP (ref 32–36)
MCV RBC AUTO: 88.9 FL — SIGNIFICANT CHANGE UP (ref 80–100)
PLATELET # BLD AUTO: 233 K/UL — SIGNIFICANT CHANGE UP (ref 150–400)
RBC # BLD: 4.36 M/UL — SIGNIFICANT CHANGE UP (ref 4.2–5.8)
RBC # FLD: 11.7 % — SIGNIFICANT CHANGE UP (ref 10.3–14.5)
WBC # BLD: 6.3 K/UL — SIGNIFICANT CHANGE UP (ref 3.8–10.5)
WBC # FLD AUTO: 6.3 K/UL — SIGNIFICANT CHANGE UP (ref 3.8–10.5)

## 2018-12-05 LAB
ALBUMIN SERPL ELPH-MCNC: 4.1 G/DL
ALP BLD-CCNC: 64 U/L
ALT SERPL-CCNC: 25 U/L
ANION GAP SERPL CALC-SCNC: 9 MMOL/L
AST SERPL-CCNC: 18 U/L
BILIRUB SERPL-MCNC: 0.5 MG/DL
BUN SERPL-MCNC: 16 MG/DL
CALCIUM SERPL-MCNC: 8.8 MG/DL
CHLORIDE SERPL-SCNC: 103 MMOL/L
CO2 SERPL-SCNC: 25 MMOL/L
CREAT SERPL-MCNC: 1.01 MG/DL
FERRITIN SERPL-MCNC: 33 NG/ML
GLUCOSE SERPL-MCNC: 165 MG/DL
IRON SATN MFR SERPL: 24 %
IRON SERPL-MCNC: 75 UG/DL
POTASSIUM SERPL-SCNC: 4.4 MMOL/L
PROT SERPL-MCNC: 6.3 G/DL
SODIUM SERPL-SCNC: 137 MMOL/L
TIBC SERPL-MCNC: 313 UG/DL
UIBC SERPL-MCNC: 238 UG/DL

## 2018-12-13 ENCOUNTER — RX RENEWAL (OUTPATIENT)
Age: 73
End: 2018-12-13

## 2019-01-04 ENCOUNTER — TRANSCRIPTION ENCOUNTER (OUTPATIENT)
Age: 74
End: 2019-01-04

## 2019-02-01 ENCOUNTER — OUTPATIENT (OUTPATIENT)
Dept: OUTPATIENT SERVICES | Facility: HOSPITAL | Age: 74
LOS: 1 days | Discharge: HOME | End: 2019-02-01

## 2019-02-01 DIAGNOSIS — D64.9 ANEMIA, UNSPECIFIED: ICD-10-CM

## 2019-02-01 DIAGNOSIS — Z87.09 PERSONAL HISTORY OF OTHER DISEASES OF THE RESPIRATORY SYSTEM: Chronic | ICD-10-CM

## 2019-02-08 ENCOUNTER — APPOINTMENT (OUTPATIENT)
Dept: HEMATOLOGY ONCOLOGY | Facility: CLINIC | Age: 74
End: 2019-02-08

## 2019-02-13 ENCOUNTER — APPOINTMENT (OUTPATIENT)
Dept: HEMATOLOGY ONCOLOGY | Facility: CLINIC | Age: 74
End: 2019-02-13

## 2019-02-27 ENCOUNTER — APPOINTMENT (OUTPATIENT)
Dept: ENDOCRINOLOGY | Facility: CLINIC | Age: 74
End: 2019-02-27
Payer: MEDICARE

## 2019-02-27 VITALS
BODY MASS INDEX: 27.53 KG/M2 | HEIGHT: 69.5 IN | OXYGEN SATURATION: 99 % | HEART RATE: 50 BPM | SYSTOLIC BLOOD PRESSURE: 112 MMHG | DIASTOLIC BLOOD PRESSURE: 70 MMHG | WEIGHT: 190.13 LBS

## 2019-02-27 DIAGNOSIS — E04.1 NONTOXIC SINGLE THYROID NODULE: ICD-10-CM

## 2019-02-27 PROCEDURE — 99214 OFFICE O/P EST MOD 30 MIN: CPT

## 2019-02-27 NOTE — ASSESSMENT
[FreeTextEntry1] : 73 y.o. male with h/o thyroid nodule, prediabetes and neck pain.\par 1. Thyroid nodule- Patient is euthyroid. Thyroid ultrasound performed today shows stable right colloid nodule measuring 0.5 cm with unremarkable left lobe. Will continue to monitor since showing stability. Recommend repeat ultrasound in 3 years. \par 2. Prediabetes- Encouraged carbohydrate consistent diet and exercise. Stable Hba1c so will continue to monitor for now.\par \par If stable, follow up in 3 years

## 2019-02-27 NOTE — PHYSICAL EXAM
[Alert] : alert [No Acute Distress] : no acute distress [Normal Sclera/Conjunctiva] : normal sclera/conjunctiva [EOMI] : extra ocular movement intact [No Neck Mass] : no neck mass was observed [Supple] : the neck was supple [No LAD] : no lymphadenopathy [Thyroid Not Enlarged] : the thyroid was not enlarged [No Accessory Muscle Use] : no accessory muscle use [Clear to Auscultation] : lungs were clear to auscultation bilaterally [Normal S1, S2] : normal S1 and S2 [Regular Rhythm] : with a regular rhythm [No Edema] : there was no peripheral edema [Normal Bowel Sounds] : normal bowel sounds [Not Tender] : non-tender [Soft] : abdomen soft [Not Distended] : not distended [Normal] : normal and non tender [No Clubbing, Cyanosis] : no clubbing  or cyanosis of the fingernails [No Rash] : no rash [Normal Reflexes] : deep tendon reflexes were 2+ and symmetric [Normal Affect] : the affect was normal [Normal Mood] : the mood was normal

## 2019-02-27 NOTE — HISTORY OF PRESENT ILLNESS
[FreeTextEntry1] : 73 y.o. male with h/o thyroid nodule and prediabetes presents for follow up visit. No neck tenderness and no dysphagia. No dysphonia.  Also diagnosed with iron deficiency anemia and taking iron. Follows with hematology and has received iron infusion. Some fatigue. Reports increased urination during the day. No blurry vision. No polydipsia. UTD with colonoscopy. Does exercise. Watching diet. \par \par Thyroid ultrasound performed in June 2016 shows stable right colloid nodule measuring 0.5 cm and a small area of calcification in the isthmus measuring 0.4 cm.

## 2019-06-02 ENCOUNTER — RX RENEWAL (OUTPATIENT)
Age: 74
End: 2019-06-02

## 2019-06-20 ENCOUNTER — APPOINTMENT (OUTPATIENT)
Dept: CARDIOLOGY | Facility: CLINIC | Age: 74
End: 2019-06-20

## 2019-06-21 ENCOUNTER — CLINICAL ADVICE (OUTPATIENT)
Age: 74
End: 2019-06-21

## 2019-06-21 ENCOUNTER — APPOINTMENT (OUTPATIENT)
Dept: CARDIOLOGY | Facility: CLINIC | Age: 74
End: 2019-06-21

## 2019-07-10 ENCOUNTER — RX RENEWAL (OUTPATIENT)
Age: 74
End: 2019-07-10

## 2019-09-24 ENCOUNTER — OUTPATIENT (OUTPATIENT)
Dept: OUTPATIENT SERVICES | Facility: HOSPITAL | Age: 74
LOS: 1 days | Discharge: HOME | End: 2019-09-24

## 2019-09-24 DIAGNOSIS — D50.9 IRON DEFICIENCY ANEMIA, UNSPECIFIED: ICD-10-CM

## 2019-09-24 DIAGNOSIS — Z87.09 PERSONAL HISTORY OF OTHER DISEASES OF THE RESPIRATORY SYSTEM: Chronic | ICD-10-CM

## 2019-09-25 ENCOUNTER — RESULT REVIEW (OUTPATIENT)
Age: 74
End: 2019-09-25

## 2019-09-25 ENCOUNTER — APPOINTMENT (OUTPATIENT)
Dept: HEMATOLOGY ONCOLOGY | Facility: CLINIC | Age: 74
End: 2019-09-25

## 2019-09-25 LAB
ALBUMIN SERPL ELPH-MCNC: 3.9 G/DL
ALP BLD-CCNC: 62 U/L
ALT SERPL-CCNC: 20 U/L
ANION GAP SERPL CALC-SCNC: 11 MMOL/L
AST SERPL-CCNC: 19 U/L
BASOPHILS # BLD AUTO: 0 K/UL — SIGNIFICANT CHANGE UP (ref 0–0.2)
BASOPHILS NFR BLD AUTO: 0.3 % — SIGNIFICANT CHANGE UP (ref 0–2)
BILIRUB SERPL-MCNC: 0.5 MG/DL
BUN SERPL-MCNC: 17 MG/DL
CALCIUM SERPL-MCNC: 9 MG/DL
CHLORIDE SERPL-SCNC: 102 MMOL/L
CO2 SERPL-SCNC: 25 MMOL/L
CREAT SERPL-MCNC: 1.2 MG/DL
EOSINOPHIL # BLD AUTO: 0.2 K/UL — SIGNIFICANT CHANGE UP (ref 0–0.5)
EOSINOPHIL NFR BLD AUTO: 4.8 % — SIGNIFICANT CHANGE UP (ref 0–6)
ESTIMATED AVERAGE GLUCOSE: 120 MG/DL
FERRITIN SERPL-MCNC: 24 NG/ML
GLUCOSE SERPL-MCNC: 113 MG/DL
HBA1C MFR BLD HPLC: 5.8 %
HCT VFR BLD CALC: 39.2 % — SIGNIFICANT CHANGE UP (ref 39–50)
HGB BLD-MCNC: 13.2 G/DL — SIGNIFICANT CHANGE UP (ref 13–17)
IRON SATN MFR SERPL: 19 %
IRON SERPL-MCNC: 63 UG/DL
LYMPHOCYTES # BLD AUTO: 1.3 K/UL — SIGNIFICANT CHANGE UP (ref 1–3.3)
LYMPHOCYTES # BLD AUTO: 25.6 % — SIGNIFICANT CHANGE UP (ref 13–44)
MCHC RBC-ENTMCNC: 30.3 PG — SIGNIFICANT CHANGE UP (ref 27–34)
MCHC RBC-ENTMCNC: 33.6 G/DL — SIGNIFICANT CHANGE UP (ref 32–36)
MCV RBC AUTO: 90.3 FL — SIGNIFICANT CHANGE UP (ref 80–100)
MONOCYTES # BLD AUTO: 0.5 K/UL — SIGNIFICANT CHANGE UP (ref 0–0.9)
MONOCYTES NFR BLD AUTO: 9.7 % — SIGNIFICANT CHANGE UP (ref 2–14)
NEUTROPHILS # BLD AUTO: 3.1 K/UL — SIGNIFICANT CHANGE UP (ref 1.8–7.4)
NEUTROPHILS NFR BLD AUTO: 59.7 % — SIGNIFICANT CHANGE UP (ref 43–77)
PLATELET # BLD AUTO: 230 K/UL — SIGNIFICANT CHANGE UP (ref 150–400)
POTASSIUM SERPL-SCNC: 4.6 MMOL/L
PROT SERPL-MCNC: 6.5 G/DL
RBC # BLD: 4.35 M/UL — SIGNIFICANT CHANGE UP (ref 4.2–5.8)
RBC # FLD: 13.3 % — SIGNIFICANT CHANGE UP (ref 10.3–14.5)
SODIUM SERPL-SCNC: 138 MMOL/L
TIBC SERPL-MCNC: 325 UG/DL
UIBC SERPL-MCNC: 262 UG/DL
WBC # BLD: 5.2 K/UL — SIGNIFICANT CHANGE UP (ref 3.8–10.5)
WBC # FLD AUTO: 5.2 K/UL — SIGNIFICANT CHANGE UP (ref 3.8–10.5)

## 2019-11-11 ENCOUNTER — APPOINTMENT (OUTPATIENT)
Dept: INTERNAL MEDICINE | Facility: CLINIC | Age: 74
End: 2019-11-11

## 2019-11-21 ENCOUNTER — APPOINTMENT (OUTPATIENT)
Dept: CARDIOLOGY | Facility: CLINIC | Age: 74
End: 2019-11-21
Payer: MEDICARE

## 2019-11-21 ENCOUNTER — NON-APPOINTMENT (OUTPATIENT)
Age: 74
End: 2019-11-21

## 2019-11-21 VITALS
BODY MASS INDEX: 27.22 KG/M2 | DIASTOLIC BLOOD PRESSURE: 64 MMHG | HEIGHT: 69.5 IN | SYSTOLIC BLOOD PRESSURE: 127 MMHG | WEIGHT: 188 LBS | HEART RATE: 43 BPM | OXYGEN SATURATION: 100 %

## 2019-11-21 PROCEDURE — 99214 OFFICE O/P EST MOD 30 MIN: CPT

## 2019-11-21 PROCEDURE — 93000 ELECTROCARDIOGRAM COMPLETE: CPT

## 2019-11-21 RX ORDER — RASAGILINE MESYLATE 0.5 MG/1
0.5 TABLET ORAL TWICE DAILY
Refills: 0 | Status: ACTIVE | COMMUNITY
Start: 2019-11-21

## 2019-11-21 NOTE — REVIEW OF SYSTEMS
[Joint Pain] : joint pain [see HPI] : see HPI [Dizziness] : dizziness [Negative] : Heme/Lymph [Shortness Of Breath] : no shortness of breath [Dyspnea on exertion] : not dyspnea during exertion [Chest Pain] : no chest pain [Lower Ext Edema] : no extremity edema [Cough] : no cough [Abdominal Pain] : no abdominal pain [Heartburn] : no heartburn [Urinary Frequency] : no change in urinary frequency [Skin: A Rash] : no rash:

## 2019-11-21 NOTE — REASON FOR VISIT
[FreeTextEntry1] : The patient is a 72-year-old male with a history of angina with nonobstructive coronary artery disease,  and sinus node dysfunction and iron deficiency anemia here because of low heart rate.

## 2019-11-21 NOTE — HISTORY OF PRESENT ILLNESS
[FreeTextEntry1] : In 2012 the patient presented with exertional chest burning. Abnormal stress echocardiogram. Cardiac catheterization revealed nonobstructive coronary disease (only 40% distal LAD). He been maintained on Ranexa with good relief of his symptoms. \par December 6, 2016. The last couple weeks the patient has been getting his chest tightness, primarily at rest when he stressed. He's had to use nitroglycerin at least once a week. He's had no chest discomfort when he walks on the treadmill or exercise in the gym. Patient also feels slightly lightheaded once in a great while. He usually is sitting when it occurs. He does not have it when he is walking or standing.\par Stress echo on December 16 was negative and the echo again showed mild to moderate MR, unchanged.\par December 4, 2017. One year since last visit. Symptoms, maybe once a week, and usually related to emotional upset and stress. Responds to nitroglycerin within 5 minutes. He never increased his Ranexa. He was complaining about the cost of Ranexa and we discussed the rationale for Ranexa versus other medications in his case. Beta blockers must be avoided given his sinus bradycardia. He had labs with Dr. Rossi on September 28. He remains with iron deficiency anemia, but he had a negative GI workup in 2014. Since then he has increased his iron from once a day to twice a day. His lipids had a cholesterol of 147, HDL 70, LDL 63, triglycerides 68, and he remains on atorvastatin. His hemoglobin A1c was 5.5, and the rest of his labs were unremarkable. He had a flu shot with Dr. Rossi.\par April 24, 2018. Patient has recently been noticing that he is getting more short of breath with exertion with some chest pressure. Can barely walk a block or 2. 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 The orthopedist wants to give him a synthetic cartilage injection and he is seeing a vascular doctor later this week because of the swelling in his legs, which is a little more on the right side, where his knee is acting up. He said he walks and feels like an old man. EKG is sinus bradycardia and unchanged. Found to have severe iron deficiency anemia and referred to GI.\par June 7, 2018. The patient returns in followup. Had colonoscopy and endoscopy that were mostly negative. Still considering capsule study. In the meantime, he is on iron twice a day, and his levels have come up. He had been transfused 3 units at the beginning. He still has some orthostatic dizziness at times, but otherwise most of his symptoms are gone. He is getting ready for a trip to [facility]. His EKG remains with sinus bradycardia, and some nonspecific ST changes, but unchanged.\par October 9, 2018. Patient was at hematology on October 5, and a heart rate of 42, was recorded. I was told he had a low heart rate and needed an appointment and he was scheduled for today. Now the patient says when he called he told them he was dizzy and having symptoms, which was not related to me. Here, he is in sinus bradycardia at 46, with mild ST depressions V4 through 6 as well as lead II and aVF. His dizziness is mostly when he changes positions that'll last for a few seconds, and then passes. He is not limited in his exercise capacity and does not quite get near syncopal. No other complaints. Able to walk 20 minutes the other day without stopping.\par November 21, 2019.  First visit in over a year.  Slightly depressed over diagnosis of Parkinson's but so far mild case and doing well.  On Azilect 0.5 twice daily and resagiline 1 mg daily.  Heart rate still slow at 41 but no syncope or near syncope.  Occasionally feels a little dizzy sitting or walking but does not sound like near syncope.  Changed his atorvastatin to every other day on his own but has not had lipids since October of last year with me.  He will have Dr. Rossi added to the blood test when he sees him in a few weeks.  Hemoglobin and hematocrit and iron levels have been holding steady.  No chest pain or shortness of breath.  Considering some experimental options for the Parkinson's going forward.

## 2019-11-21 NOTE — PHYSICAL EXAM
[General Appearance - Well Developed] : well developed [Normal Appearance] : normal appearance [Well Groomed] : well groomed [General Appearance - Well Nourished] : well nourished [No Deformities] : no deformities [General Appearance - In No Acute Distress] : no acute distress [Normal Conjunctiva] : the conjunctiva exhibited no abnormalities [Eyelids - No Xanthelasma] : the eyelids demonstrated no xanthelasmas [Normal Oral Mucosa] : normal oral mucosa [No Oral Pallor] : no oral pallor [No Oral Cyanosis] : no oral cyanosis [Normal Jugular Venous A Waves Present] : normal jugular venous A waves present [Normal Jugular Venous V Waves Present] : normal jugular venous V waves present [No Jugular Venous Wasserman A Waves] : no jugular venous wasserman A waves [Respiration, Rhythm And Depth] : normal respiratory rhythm and effort [Exaggerated Use Of Accessory Muscles For Inspiration] : no accessory muscle use [Auscultation Breath Sounds / Voice Sounds] : lungs were clear to auscultation bilaterally [Heart Rate And Rhythm] : heart rate and rhythm were normal [Heart Sounds] : normal S1 and S2 [Murmurs] : no murmurs present [Bradycardic ___] : the heart rate was bradycardic at [unfilled] bpm [Regular-Premature Beats] : the rhythm was regular with premature beats [Abdomen Soft] : soft [Abdomen Tenderness] : non-tender [Abdomen Mass (___ Cm)] : no abdominal mass palpated [Abnormal Walk] : normal gait [Gait - Sufficient For Exercise Testing] : the gait was sufficient for exercise testing [Nail Clubbing] : no clubbing of the fingernails [Cyanosis, Localized] : no localized cyanosis [Petechial Hemorrhages (___cm)] : no petechial hemorrhages [Skin Color & Pigmentation] : normal skin color and pigmentation [] : no rash [No Venous Stasis] : no venous stasis [Skin Lesions] : no skin lesions [No Skin Ulcers] : no skin ulcer [No Xanthoma] : no  xanthoma was observed [Oriented To Time, Place, And Person] : oriented to person, place, and time [Affect] : the affect was normal [Mood] : the mood was normal [FreeTextEntry1] : Much less anxious

## 2019-12-02 ENCOUNTER — MEDICATION RENEWAL (OUTPATIENT)
Age: 74
End: 2019-12-02

## 2019-12-03 ENCOUNTER — RX RENEWAL (OUTPATIENT)
Age: 74
End: 2019-12-03

## 2019-12-09 ENCOUNTER — APPOINTMENT (OUTPATIENT)
Dept: CARDIOLOGY | Facility: CLINIC | Age: 74
End: 2019-12-09
Payer: MEDICARE

## 2019-12-09 ENCOUNTER — APPOINTMENT (OUTPATIENT)
Dept: INTERNAL MEDICINE | Facility: CLINIC | Age: 74
End: 2019-12-09
Payer: MEDICARE

## 2019-12-09 VITALS
HEIGHT: 68.5 IN | HEART RATE: 55 BPM | TEMPERATURE: 97.7 F | BODY MASS INDEX: 28.02 KG/M2 | WEIGHT: 187 LBS | OXYGEN SATURATION: 99 %

## 2019-12-09 VITALS — HEART RATE: 48 BPM

## 2019-12-09 VITALS
HEIGHT: 69.5 IN | DIASTOLIC BLOOD PRESSURE: 77 MMHG | BODY MASS INDEX: 27.22 KG/M2 | WEIGHT: 188 LBS | OXYGEN SATURATION: 99 % | HEART RATE: 59 BPM | SYSTOLIC BLOOD PRESSURE: 114 MMHG

## 2019-12-09 PROCEDURE — 82270 OCCULT BLOOD FECES: CPT

## 2019-12-09 PROCEDURE — 99213 OFFICE O/P EST LOW 20 MIN: CPT

## 2019-12-09 PROCEDURE — G0439: CPT

## 2019-12-09 NOTE — REASON FOR VISIT
[FreeTextEntry1] : The patient is a 73-year-old male with a history of angina with nonobstructive coronary artery disease,  and sinus node dysfunction and iron deficiency anemia here because of low heart rate.

## 2019-12-09 NOTE — PHYSICAL EXAM
[General Appearance - Well Developed] : well developed [Well Groomed] : well groomed [Normal Appearance] : normal appearance [General Appearance - Well Nourished] : well nourished [General Appearance - In No Acute Distress] : no acute distress [No Deformities] : no deformities [Eyelids - No Xanthelasma] : the eyelids demonstrated no xanthelasmas [Normal Oral Mucosa] : normal oral mucosa [Normal Conjunctiva] : the conjunctiva exhibited no abnormalities [Normal Jugular Venous A Waves Present] : normal jugular venous A waves present [No Oral Cyanosis] : no oral cyanosis [No Oral Pallor] : no oral pallor [Normal Jugular Venous V Waves Present] : normal jugular venous V waves present [No Jugular Venous Wasserman A Waves] : no jugular venous wasserman A waves [Auscultation Breath Sounds / Voice Sounds] : lungs were clear to auscultation bilaterally [Exaggerated Use Of Accessory Muscles For Inspiration] : no accessory muscle use [Respiration, Rhythm And Depth] : normal respiratory rhythm and effort [Murmurs] : no murmurs present [Heart Rate And Rhythm] : heart rate and rhythm were normal [Heart Sounds] : normal S1 and S2 [Regular-Premature Beats] : the rhythm was regular with premature beats [Bradycardic ___] : the heart rate was bradycardic at [unfilled] bpm [Abdomen Soft] : soft [Abdomen Tenderness] : non-tender [Abdomen Mass (___ Cm)] : no abdominal mass palpated [Abnormal Walk] : normal gait [Gait - Sufficient For Exercise Testing] : the gait was sufficient for exercise testing [Nail Clubbing] : no clubbing of the fingernails [Cyanosis, Localized] : no localized cyanosis [Petechial Hemorrhages (___cm)] : no petechial hemorrhages [Skin Color & Pigmentation] : normal skin color and pigmentation [No Venous Stasis] : no venous stasis [] : no rash [Skin Lesions] : no skin lesions [No Xanthoma] : no  xanthoma was observed [No Skin Ulcers] : no skin ulcer [Oriented To Time, Place, And Person] : oriented to person, place, and time [Affect] : the affect was normal [Mood] : the mood was normal [FreeTextEntry1] : Much less anxious

## 2019-12-09 NOTE — HISTORY OF PRESENT ILLNESS
[FreeTextEntry1] : In 2012 the patient presented with exertional chest burning. Abnormal stress echocardiogram. Cardiac catheterization revealed nonobstructive coronary disease (only 40% distal LAD). He been maintained on Ranexa with good relief of his symptoms. \par December 6, 2016. The last couple weeks the patient has been getting his chest tightness, primarily at rest when he stressed. He's had to use nitroglycerin at least once a week. He's had no chest discomfort when he walks on the treadmill or exercise in the gym. Patient also feels slightly lightheaded once in a great while. He usually is sitting when it occurs. He does not have it when he is walking or standing.\par Stress echo on December 16 was negative and the echo again showed mild to moderate MR, unchanged.\par December 4, 2017. One year since last visit. Symptoms, maybe once a week, and usually related to emotional upset and stress. Responds to nitroglycerin within 5 minutes. He never increased his Ranexa. He was complaining about the cost of Ranexa and we discussed the rationale for Ranexa versus other medications in his case. Beta blockers must be avoided given his sinus bradycardia. He had labs with Dr. Rossi on September 28. He remains with iron deficiency anemia, but he had a negative GI workup in 2014. Since then he has increased his iron from once a day to twice a day. His lipids had a cholesterol of 147, HDL 70, LDL 63, triglycerides 68, and he remains on atorvastatin. His hemoglobin A1c was 5.5, and the rest of his labs were unremarkable. He had a flu shot with Dr. Rossi.\par April 24, 2018. Patient has recently been noticing that he is getting more short of breath with exertion with some chest pressure. Can barely walk a block or 2. 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 The orthopedist wants to give him a synthetic cartilage injection and he is seeing a vascular doctor later this week because of the swelling in his legs, which is a little more on the right side, where his knee is acting up. He said he walks and feels like an old man. EKG is sinus bradycardia and unchanged. Found to have severe iron deficiency anemia and referred to GI.\par June 7, 2018. The patient returns in followup. Had colonoscopy and endoscopy that were mostly negative. Still considering capsule study. In the meantime, he is on iron twice a day, and his levels have come up. He had been transfused 3 units at the beginning. He still has some orthostatic dizziness at times, but otherwise most of his symptoms are gone. He is getting ready for a trip to [facility]. His EKG remains with sinus bradycardia, and some nonspecific ST changes, but unchanged.\par October 9, 2018. Patient was at hematology on October 5, and a heart rate of 42, was recorded. I was told he had a low heart rate and needed an appointment and he was scheduled for today. Now the patient says when he called he told them he was dizzy and having symptoms, which was not related to me. Here, he is in sinus bradycardia at 46, with mild ST depressions V4 through 6 as well as lead II and aVF. His dizziness is mostly when he changes positions that'll last for a few seconds, and then passes. He is not limited in his exercise capacity and does not quite get near syncopal. No other complaints. Able to walk 20 minutes the other day without stopping.\par November 21, 2019.  First visit in over a year.  Slightly depressed over diagnosis of Parkinson's but so far mild case and doing well.  On Azilect 0.5 twice daily and resagiline 1 mg daily.  Heart rate still slow at 41 but no syncope or near syncope.  Occasionally feels a little dizzy sitting or walking but does not sound like near syncope.  Changed his atorvastatin to every other day on his own but has not had lipids since October of last year with me.  He will have Dr. Rossi added to the blood test when he sees him in a few weeks.  Hemoglobin and hematocrit and iron levels have been holding steady.  No chest pain or shortness of breath.  Considering some experimental options for the Parkinson's going forward.\par December 9, 2019.  Patient called and wanted to come in.  Saw Dr. Sohail Arana of neurology who told him he thinks he needs a pacemaker and that his low heart rate could be explaining some of his Parkinson's symptoms.  I put in a call to Dr. Arana and I am awaiting a response.  In the meantime reviewed the symptoms and the procedure with the patient.  At times he says he does have some lightheadedness and dizziness and wants the pacemaker again at other times is too nervous does not want to stay in hospital overnight and does not think he is having any symptoms different than the last few years.  Certainly no syncope and no definite near syncope.  Heart rate here was 48.  Blood pressure okay.  No recent change in medications.

## 2019-12-09 NOTE — REVIEW OF SYSTEMS
[Joint Pain] : joint pain [see HPI] : see HPI [Dizziness] : dizziness [Negative] : Endocrine [Shortness Of Breath] : no shortness of breath [Dyspnea on exertion] : not dyspnea during exertion [Chest Pain] : no chest pain [Lower Ext Edema] : no extremity edema [Cough] : no cough [Abdominal Pain] : no abdominal pain [Heartburn] : no heartburn [Urinary Frequency] : no change in urinary frequency [Skin: A Rash] : no rash:

## 2019-12-10 VITALS — SYSTOLIC BLOOD PRESSURE: 120 MMHG | DIASTOLIC BLOOD PRESSURE: 70 MMHG

## 2019-12-10 LAB
APPEARANCE: CLEAR
BACTERIA: NEGATIVE
BILIRUBIN URINE: NEGATIVE
BLOOD URINE: NEGATIVE
COLOR: NORMAL
GLUCOSE QUALITATIVE U: NEGATIVE
HYALINE CASTS: 0 /LPF
KETONES URINE: NEGATIVE
LEUKOCYTE ESTERASE URINE: NEGATIVE
MICROSCOPIC-UA: NORMAL
NITRITE URINE: NEGATIVE
PH URINE: 7
PROTEIN URINE: NEGATIVE
RED BLOOD CELLS URINE: 0 /HPF
SPECIFIC GRAVITY URINE: 1.01
SQUAMOUS EPITHELIAL CELLS: 0 /HPF
UROBILINOGEN URINE: NORMAL
WHITE BLOOD CELLS URINE: 0 /HPF

## 2019-12-10 NOTE — ASSESSMENT
[FreeTextEntry1] : We discussed the Parkinsons.  He is seeing his neurologist and the symptoms are managed with medication.  He is not limited by it although he does have a mild rigidity and a feeling of stiffness.  The diagnosis has also been psychologically difficult for him and he was counseled.  \par \par He saw his cardiologist and the bradycardia is being addressed.  The patient should remain active and exercise.  \par \par Check lipids on Atorvastatin- he has been taking it QOD on his own and he was advised to go back to QD.  Discussed diet.  Check a CPK, ESR and CRP given the stiffness.\par \par S/p flu vaccine, Prevnar.  Pneumovax advised which he declines but will consider.  He should have Shingrix at his pharmacy.  \par \par Check a CBC and iron studies given the previous anemia.  He last saw Dr. Wills three months ago.  He is now taking ferrous sulfate QD (lowered from BID on his own).  \par \par Colonoscopy 5/18.\par \par He sees his endocrinologist.  \par \par Check a HgBA1c- last 5.8.\par \par Check a PSA. \par \par He will return for fasting labs.

## 2019-12-10 NOTE — PHYSICAL EXAM
[Well Nourished] : well nourished [No Acute Distress] : no acute distress [Well Developed] : well developed [PERRL] : pupils equal round and reactive to light [Well-Appearing] : well-appearing [Normal Sclera/Conjunctiva] : normal sclera/conjunctiva [Normal Outer Ear/Nose] : the outer ears and nose were normal in appearance [EOMI] : extraocular movements intact [Normal Oropharynx] : the oropharynx was normal [No Lymphadenopathy] : no lymphadenopathy [No JVD] : no jugular venous distention [Supple] : supple [Thyroid Normal, No Nodules] : the thyroid was normal and there were no nodules present [No Respiratory Distress] : no respiratory distress  [No Accessory Muscle Use] : no accessory muscle use [Clear to Auscultation] : lungs were clear to auscultation bilaterally [Normal Rate] : normal rate  [No Murmur] : no murmur heard [Regular Rhythm] : with a regular rhythm [Normal S1, S2] : normal S1 and S2 [No Abdominal Bruit] : a ~M bruit was not heard ~T in the abdomen [No Carotid Bruits] : no carotid bruits [No Varicosities] : no varicosities [No Edema] : there was no peripheral edema [No Palpable Aorta] : no palpable aorta [Pedal Pulses Present] : the pedal pulses are present [Non Tender] : non-tender [Soft] : abdomen soft [No Extremity Clubbing/Cyanosis] : no extremity clubbing/cyanosis [Non-distended] : non-distended [No HSM] : no HSM [No Masses] : no abdominal mass palpated [Normal Sphincter Tone] : normal sphincter tone [Normal Bowel Sounds] : normal bowel sounds [No Mass] : no mass [Stool Occult Blood] : stool negative for occult blood [Normal Posterior Cervical Nodes] : no posterior cervical lymphadenopathy [Normal Anterior Cervical Nodes] : no anterior cervical lymphadenopathy [No Spinal Tenderness] : no spinal tenderness [No CVA Tenderness] : no CVA  tenderness [No Joint Swelling] : no joint swelling [No Rash] : no rash [Grossly Normal Strength/Tone] : grossly normal strength/tone [Coordination Grossly Intact] : coordination grossly intact [No Focal Deficits] : no focal deficits [Normal Affect] : the affect was normal [Normal Gait] : normal gait [Deep Tendon Reflexes (DTR)] : deep tendon reflexes were 2+ and symmetric [Normal Insight/Judgement] : insight and judgment were intact [de-identified] : mild rigidity and tremor

## 2019-12-10 NOTE — HISTORY OF PRESENT ILLNESS
[FreeTextEntry1] : The patient is here for a routine visit.  [de-identified] : The patient has had a change in his gait and developed a tremor.  He was seen by a neurologist, Dr. Arana, and he has been diagnosed with Parkinson's disease.  He is now on medication for it which has helped.  It doesn't limit his activities but he feels stiff, especially in the morning.\par \par He saw his cardiologist, Dr. Celis, because of the bradycardia.  The patient says that his neurologist suggested that a pacemaker might be helpful given the bradycardia.  \par \par The patient remains active and walks daily.  He also does PT twice per week.  His diet is ok.  \par \par He says he is UTD seeing Dr. Lombardi regarding the nodules.

## 2019-12-10 NOTE — HEALTH RISK ASSESSMENT
[No] : No [No falls in past year] : Patient reported no falls in the past year [] : No [0] : 2) Feeling down, depressed, or hopeless: Not at all (0) [Fully functional (bathing, dressing, toileting, transferring, walking, feeding)] : Fully functional (bathing, dressing, toileting, transferring, walking, feeding) [[id number]] : 0 [Reports changes in hearing] : Reports no changes in hearing [Reports changes in vision] : Reports no changes in vision [Fully functional (using the telephone, shopping, preparing meals, housekeeping, doing laundry, using] : Fully functional and needs no help or supervision to perform IADLs (using the telephone, shopping, preparing meals, housekeeping, doing laundry, using transportation, managing medications and managing finances) [Reports changes in dental health] : Reports no changes in dental health

## 2020-01-13 ENCOUNTER — APPOINTMENT (OUTPATIENT)
Dept: INTERNAL MEDICINE | Facility: CLINIC | Age: 75
End: 2020-01-13
Payer: MEDICARE

## 2020-01-13 PROCEDURE — 36415 COLL VENOUS BLD VENIPUNCTURE: CPT

## 2020-01-30 LAB
25(OH)D3 SERPL-MCNC: 22 NG/ML
ALBUMIN SERPL ELPH-MCNC: 4.4 G/DL
ALP BLD-CCNC: 63 U/L
ALT SERPL-CCNC: 19 U/L
ANION GAP SERPL CALC-SCNC: 12 MMOL/L
AST SERPL-CCNC: 17 U/L
BASOPHILS # BLD AUTO: 0.04 K/UL
BASOPHILS NFR BLD AUTO: 0.7 %
BILIRUB SERPL-MCNC: 0.5 MG/DL
BUN SERPL-MCNC: 15 MG/DL
CALCIUM SERPL-MCNC: 9.2 MG/DL
CHLORIDE SERPL-SCNC: 101 MMOL/L
CHOLEST SERPL-MCNC: 241 MG/DL
CHOLEST/HDLC SERPL: 3.4 RATIO
CK SERPL-CCNC: 106 U/L
CO2 SERPL-SCNC: 25 MMOL/L
CREAT SERPL-MCNC: 1.22 MG/DL
CRP SERPL-MCNC: <0.1 MG/DL
EOSINOPHIL # BLD AUTO: 0.29 K/UL
EOSINOPHIL NFR BLD AUTO: 5.4 %
ERYTHROCYTE [SEDIMENTATION RATE] IN BLOOD BY WESTERGREN METHOD: 22 MM/HR
ESTIMATED AVERAGE GLUCOSE: 114 MG/DL
GLUCOSE SERPL-MCNC: 126 MG/DL
HBA1C MFR BLD HPLC: 5.6 %
HCT VFR BLD CALC: 41.8 %
HDLC SERPL-MCNC: 70 MG/DL
HGB BLD-MCNC: 13.8 G/DL
IMM GRANULOCYTES NFR BLD AUTO: 0.6 %
LDLC SERPL CALC-MCNC: 147 MG/DL
LYMPHOCYTES # BLD AUTO: 1.15 K/UL
LYMPHOCYTES NFR BLD AUTO: 21.3 %
MAN DIFF?: NORMAL
MCHC RBC-ENTMCNC: 29.7 PG
MCHC RBC-ENTMCNC: 33 GM/DL
MCV RBC AUTO: 90.1 FL
MONOCYTES # BLD AUTO: 0.51 K/UL
MONOCYTES NFR BLD AUTO: 9.4 %
NEUTROPHILS # BLD AUTO: 3.38 K/UL
NEUTROPHILS NFR BLD AUTO: 62.6 %
PLATELET # BLD AUTO: 263 K/UL
POTASSIUM SERPL-SCNC: 4.6 MMOL/L
PROT SERPL-MCNC: 6.6 G/DL
PSA SERPL-MCNC: 0.28 NG/ML
RBC # BLD: 4.64 M/UL
RBC # FLD: 12.9 %
SODIUM SERPL-SCNC: 138 MMOL/L
T4 FREE SERPL-MCNC: 1.1 NG/DL
TRIGL SERPL-MCNC: 122 MG/DL
TSH SERPL-ACNC: 4.15 UIU/ML
WBC # FLD AUTO: 5.4 K/UL

## 2020-03-11 ENCOUNTER — APPOINTMENT (OUTPATIENT)
Dept: INTERNAL MEDICINE | Facility: CLINIC | Age: 75
End: 2020-03-11
Payer: MEDICARE

## 2020-03-11 VITALS
HEART RATE: 54 BPM | BODY MASS INDEX: 28.32 KG/M2 | OXYGEN SATURATION: 99 % | HEIGHT: 68.5 IN | TEMPERATURE: 98.1 F | WEIGHT: 189 LBS

## 2020-03-11 VITALS — DIASTOLIC BLOOD PRESSURE: 60 MMHG | SYSTOLIC BLOOD PRESSURE: 110 MMHG

## 2020-03-11 PROCEDURE — 99213 OFFICE O/P EST LOW 20 MIN: CPT

## 2020-03-11 NOTE — ASSESSMENT
[FreeTextEntry1] : He has subjective wheezing although his lung exam is normal now.  He appears well and he does not have a fever or appear ill.  He does not appear infected and there is no concern for COVID-19.  He might have mild asthma or allergies since symptoms appear to be seasonal.  He declines medication for now but he can try an Albuterol inhaler PRN which he will take with him.  Monitor and call PRN.  Could consider a pulmonary evaluation if it persists.

## 2020-03-11 NOTE — HISTORY OF PRESENT ILLNESS
[FreeTextEntry8] : The patient has symptoms for about two weeks- he had a dry cough that resolved.  he now has subjective wheezing which is intermittent and he notices it more when he lies down at night.  There might be some clear mucus.  No fever, chills, dyspnea, chest pain, sore throat, sinus pain.  It started when he was in [place] a couple of weeks ago.  He hasn't taken anything for it.\par \par He feels that he has had similar symptoms in the past around this time of year and it has resolved in the past.\par \par He has no recent international travel.  He has no sick contacts and he has been trying to avoid people.  No known contact with COVID-19.

## 2020-03-15 ENCOUNTER — RX RENEWAL (OUTPATIENT)
Age: 75
End: 2020-03-15

## 2020-04-12 ENCOUNTER — RX RENEWAL (OUTPATIENT)
Age: 75
End: 2020-04-12

## 2020-05-25 ENCOUNTER — RX RENEWAL (OUTPATIENT)
Age: 75
End: 2020-05-25

## 2020-06-03 ENCOUNTER — RESULT REVIEW (OUTPATIENT)
Age: 75
End: 2020-06-03

## 2020-06-03 ENCOUNTER — APPOINTMENT (OUTPATIENT)
Dept: HEMATOLOGY ONCOLOGY | Facility: CLINIC | Age: 75
End: 2020-06-03
Payer: MEDICARE

## 2020-06-03 ENCOUNTER — OUTPATIENT (OUTPATIENT)
Dept: OUTPATIENT SERVICES | Facility: HOSPITAL | Age: 75
LOS: 1 days | Discharge: HOME | End: 2020-06-03

## 2020-06-03 VITALS
TEMPERATURE: 97 F | BODY MASS INDEX: 28.08 KG/M2 | OXYGEN SATURATION: 100 % | DIASTOLIC BLOOD PRESSURE: 72 MMHG | HEART RATE: 50 BPM | RESPIRATION RATE: 17 BRPM | SYSTOLIC BLOOD PRESSURE: 117 MMHG | WEIGHT: 187.39 LBS

## 2020-06-03 DIAGNOSIS — D50.9 IRON DEFICIENCY ANEMIA, UNSPECIFIED: ICD-10-CM

## 2020-06-03 DIAGNOSIS — Z87.09 PERSONAL HISTORY OF OTHER DISEASES OF THE RESPIRATORY SYSTEM: Chronic | ICD-10-CM

## 2020-06-03 LAB
25(OH)D3 SERPL-MCNC: 22.3 NG/ML
ALBUMIN SERPL ELPH-MCNC: 4.3 G/DL
ALP BLD-CCNC: 64 U/L
ALT SERPL-CCNC: 13 U/L
ANION GAP SERPL CALC-SCNC: 11 MMOL/L
AST SERPL-CCNC: 18 U/L
BASOPHILS # BLD AUTO: 0.03 K/UL — SIGNIFICANT CHANGE UP (ref 0–0.2)
BASOPHILS NFR BLD AUTO: 0.6 % — SIGNIFICANT CHANGE UP (ref 0–2)
BILIRUB SERPL-MCNC: 0.4 MG/DL
BUN SERPL-MCNC: 17 MG/DL
CALCIUM SERPL-MCNC: 9.4 MG/DL
CHLORIDE SERPL-SCNC: 101 MMOL/L
CHOLEST SERPL-MCNC: 209 MG/DL
CHOLEST/HDLC SERPL: 3.1 RATIO
CO2 SERPL-SCNC: 24 MMOL/L
CREAT SERPL-MCNC: 1.07 MG/DL
EOSINOPHIL # BLD AUTO: 0.3 K/UL — SIGNIFICANT CHANGE UP (ref 0–0.5)
EOSINOPHIL NFR BLD AUTO: 6.1 % — HIGH (ref 0–6)
FERRITIN SERPL-MCNC: 27 NG/ML
GLUCOSE SERPL-MCNC: 138 MG/DL
HCT VFR BLD CALC: 38.2 % — LOW (ref 39–50)
HDLC SERPL-MCNC: 68 MG/DL
HGB BLD-MCNC: 12.8 G/DL — LOW (ref 13–17)
IMM GRANULOCYTES NFR BLD AUTO: 0.2 % — SIGNIFICANT CHANGE UP (ref 0–1.5)
IRON SATN MFR SERPL: 34 %
IRON SERPL-MCNC: 115 UG/DL
LDLC SERPL CALC-MCNC: 117 MG/DL
LYMPHOCYTES # BLD AUTO: 1.15 K/UL — SIGNIFICANT CHANGE UP (ref 1–3.3)
LYMPHOCYTES # BLD AUTO: 23.5 % — SIGNIFICANT CHANGE UP (ref 13–44)
MCHC RBC-ENTMCNC: 29.5 PG — SIGNIFICANT CHANGE UP (ref 27–34)
MCHC RBC-ENTMCNC: 33.5 GM/DL — SIGNIFICANT CHANGE UP (ref 32–36)
MCV RBC AUTO: 88 FL — SIGNIFICANT CHANGE UP (ref 80–100)
MONOCYTES # BLD AUTO: 0.46 K/UL — SIGNIFICANT CHANGE UP (ref 0–0.9)
MONOCYTES NFR BLD AUTO: 9.4 % — SIGNIFICANT CHANGE UP (ref 2–14)
NEUTROPHILS # BLD AUTO: 2.95 K/UL — SIGNIFICANT CHANGE UP (ref 1.8–7.4)
NEUTROPHILS NFR BLD AUTO: 60.2 % — SIGNIFICANT CHANGE UP (ref 43–77)
NRBC # BLD: 0 /100 WBCS — SIGNIFICANT CHANGE UP (ref 0–0)
PLATELET # BLD AUTO: 250 K/UL — SIGNIFICANT CHANGE UP (ref 150–400)
POTASSIUM SERPL-SCNC: 4.4 MMOL/L
PROT SERPL-MCNC: 6.5 G/DL
RBC # BLD: 4.34 M/UL — SIGNIFICANT CHANGE UP (ref 4.2–5.8)
RBC # FLD: 12.9 % — SIGNIFICANT CHANGE UP (ref 10.3–14.5)
SODIUM SERPL-SCNC: 137 MMOL/L
TIBC SERPL-MCNC: 334 UG/DL
TRIGL SERPL-MCNC: 117 MG/DL
UIBC SERPL-MCNC: 219 UG/DL
WBC # BLD: 4.9 K/UL — SIGNIFICANT CHANGE UP (ref 3.8–10.5)
WBC # FLD AUTO: 4.9 K/UL — SIGNIFICANT CHANGE UP (ref 3.8–10.5)

## 2020-06-03 PROCEDURE — 99215 OFFICE O/P EST HI 40 MIN: CPT

## 2020-06-03 NOTE — REVIEW OF SYSTEMS
[Negative] : Allergic/Immunologic [Fatigue] : fatigue [SOB on Exertion] : shortness of breath during exertion [Joint Stiffness] : joint stiffness [Dizziness] : dizziness [Fever] : no fever [Chills] : no chills [Night Sweats] : no night sweats [Recent Change In Weight] : ~T no recent weight change [Eye Pain] : no eye pain [Red Eyes] : eyes not red [Dry Eyes] : no dryness of the eyes [Vision Problems] : no vision problems [Dysphagia] : no dysphagia [Loss of Hearing] : no loss of hearing [Nosebleeds] : no nosebleeds [Hoarseness] : no hoarseness [Odynophagia] : no odynophagia [Mucosal Pain] : no mucosal pain [Chest Pain] : no chest pain [Palpitations] : no palpitations [Leg Claudication] : no intermittent leg claudication [Lower Ext Edema] : no lower extremity edema [Shortness Of Breath] : no shortness of breath [Wheezing] : no wheezing [Cough] : no cough [Abdominal Pain] : no abdominal pain [Vomiting] : no vomiting [Constipation] : no constipation [Diarrhea] : no diarrhea [Dysuria] : no dysuria [Incontinence] : no incontinence [Joint Pain] : no joint pain [Muscle Pain] : no muscle pain [Skin Rash] : no skin rash [Skin Wound] : no skin wound [Confused] : no confusion [Fainting] : no fainting [Difficulty Walking] : no difficulty walking [Suicidal] : not suicidal [Insomnia] : no insomnia [Anxiety] : no anxiety [Depression] : no depression [Proptosis] : no proptosis [Hot Flashes] : no hot flashes [Muscle Weakness] : no muscle weakness [Deepening Of The Voice] : no deepening of the voice [Easy Bleeding] : no tendency for easy bleeding [Easy Bruising] : no tendency for easy bruising [Swollen Glands] : no swollen glands

## 2020-06-03 NOTE — HISTORY OF PRESENT ILLNESS
[Disease:__________________________] : Disease: [unfilled] [Treatment Protocol] : Treatment Protocol [Cardiovascular] : Cardiovascular [ENT] : ENT [Constitutional] : Constitutional [Dermatologic] : Dermatologic [Endocrine] : Endocrine [Gastrointestinal] : Gastrointestinal [Gynecologic] : Gynecologic [Genitourinary] : Genitourinary [Musculoskeletal] : Musculoskeletal [Infectious] : Infectious [Neurologic] : Neurologic [Pain] : Pain [Pulmonary] : Pulmonary [Hematologic] : Hematologic [de-identified] : 74 year old male presenting to the office for hematologic care. He is referred here by Dr. Kurtis Rossi (internal medicine). Patient was noted have the following lab values from Dr. Ari Celis's office (cardiologist) on 4/25/2018: hgb 6.8, ferritin 6, serum iron 15, iron saturation 4%. He admitted to having a history of iron deficiency anemia for at least 5 years (since 2013). He admitted to oral iron supplementation but denied eating red meats. He denied receiving blood transfusions and intravenous iron in the past.   [FreeTextEntry1] : s/p IV iron, currently oral iron (325 mg daily) [de-identified] : Patient presented to the office for routine semi-annual follow-up visit. He complained of recent onset of lightheadedness/dizziness x 1 month; he denied increased fatigue, fever/chills, shortness of breath, chest pain, nausea, vomiting, bleeding episodes, recent diet changes, recent bowel movement changes, recent medication changes. He continues to follow up with his PCP, cardiologist and started seeing Dr. Jarad Arana (neurologist) from [facility] due to his Parkinson's diagnosis since summer of 2019.

## 2020-06-03 NOTE — ASSESSMENT
[Supportive] : Goals of care discussed with patient: Supportive [Palliative Care Plan] : not applicable at this time [FreeTextEntry1] : Hai Perales is a 74 year old male presenting to [facility] for hematologic care of iron deficiency anemia, previously treated with IV iron (s/p 3 weekly doses of Venofer 200 mg from 5/4/2018 - 5/17/2018), currently on oral iron supplementation (325 mg daily). He appeared well today and his physical examination findings were unremarkable at this time. His blood count results revealed stable findings so it is unlikely that his reported symptoms are due to hematologic issue; he was advised to remain on oral iron. He will follow up with neurology (Parkinson's disease) and cardiology (bradycardia) as directed. Return to the office in 6 months. Seen in conjunction with Dr. Adalid Wills. \par \par

## 2020-06-08 LAB
HGB A MFR BLD: 97.6 %
HGB A2 MFR BLD: 2.4 %
HGB FRACT BLD-IMP: NORMAL

## 2020-06-29 ENCOUNTER — RX RENEWAL (OUTPATIENT)
Age: 75
End: 2020-06-29

## 2020-07-07 ENCOUNTER — NON-APPOINTMENT (OUTPATIENT)
Age: 75
End: 2020-07-07

## 2020-07-07 ENCOUNTER — APPOINTMENT (OUTPATIENT)
Dept: CARDIOLOGY | Facility: CLINIC | Age: 75
End: 2020-07-07
Payer: MEDICARE

## 2020-07-07 VITALS
BODY MASS INDEX: 27.27 KG/M2 | TEMPERATURE: 97.3 F | WEIGHT: 182 LBS | DIASTOLIC BLOOD PRESSURE: 73 MMHG | OXYGEN SATURATION: 99 % | HEART RATE: 47 BPM | SYSTOLIC BLOOD PRESSURE: 117 MMHG

## 2020-07-07 PROCEDURE — 99214 OFFICE O/P EST MOD 30 MIN: CPT

## 2020-07-07 PROCEDURE — 93000 ELECTROCARDIOGRAM COMPLETE: CPT

## 2020-07-07 NOTE — HISTORY OF PRESENT ILLNESS
[FreeTextEntry1] : In 2012 the patient presented with exertional chest burning. Abnormal stress echocardiogram. Cardiac catheterization revealed nonobstructive coronary disease (only 40% distal LAD). He been maintained on Ranexa with good relief of his symptoms. \par December 6, 2016. The last couple weeks the patient has been getting his chest tightness, primarily at rest when he stressed. He's had to use nitroglycerin at least once a week. He's had no chest discomfort when he walks on the treadmill or exercise in the gym. Patient also feels slightly lightheaded once in a great while. He usually is sitting when it occurs. He does not have it when he is walking or standing.\par Stress echo on December 16 was negative and the echo again showed mild to moderate MR, unchanged.\par December 4, 2017. One year since last visit. Symptoms, maybe once a week, and usually related to emotional upset and stress. Responds to nitroglycerin within 5 minutes. He never increased his Ranexa. He was complaining about the cost of Ranexa and we discussed the rationale for Ranexa versus other medications in his case. Beta blockers must be avoided given his sinus bradycardia. He had labs with Dr. Rossi on September 28. He remains with iron deficiency anemia, but he had a negative GI workup in 2014. Since then he has increased his iron from once a day to twice a day. His lipids had a cholesterol of 147, HDL 70, LDL 63, triglycerides 68, and he remains on atorvastatin. His hemoglobin A1c was 5.5, and the rest of his labs were unremarkable. He had a flu shot with Dr. Rossi.\par April 24, 2018. Patient has recently been noticing that he is getting more short of breath with exertion with some chest pressure. Can barely walk a block or 2. 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 The orthopedist wants to give him a synthetic cartilage injection and he is seeing a vascular doctor later this week because of the swelling in his legs, which is a little more on the right side, where his knee is acting up. He said he walks and feels like an old man. EKG is sinus bradycardia and unchanged. Found to have severe iron deficiency anemia and referred to GI.\par June 7, 2018. The patient returns in followup. Had colonoscopy and endoscopy that were mostly negative. Still considering capsule study. In the meantime, he is on iron twice a day, and his levels have come up. He had been transfused 3 units at the beginning. He still has some orthostatic dizziness at times, but otherwise most of his symptoms are gone. He is getting ready for a trip to [facility]. His EKG remains with sinus bradycardia, and some nonspecific ST changes, but unchanged.\par October 9, 2018. Patient was at hematology on October 5, and a heart rate of 42, was recorded. I was told he had a low heart rate and needed an appointment and he was scheduled for today. Now the patient says when he called he told them he was dizzy and having symptoms, which was not related to me. Here, he is in sinus bradycardia at 46, with mild ST depressions V4 through 6 as well as lead II and aVF. His dizziness is mostly when he changes positions that'll last for a few seconds, and then passes. He is not limited in his exercise capacity and does not quite get near syncopal. No other complaints. Able to walk 20 minutes the other day without stopping.\par November 21, 2019.  First visit in over a year.  Slightly depressed over diagnosis of Parkinson's but so far mild case and doing well.  On Azilect 0.5 twice daily and resagiline 1 mg daily.  Heart rate still slow at 41 but no syncope or near syncope.  Occasionally feels a little dizzy sitting or walking but does not sound like near syncope.  Changed his atorvastatin to every other day on his own but has not had lipids since October of last year with me.  He will have Dr. Rossi added to the blood test when he sees him in a few weeks.  Hemoglobin and hematocrit and iron levels have been holding steady.  No chest pain or shortness of breath.  Considering some experimental options for the Parkinson's going forward.\par December 9, 2019.  Patient called and wanted to come in.  Saw Dr. Sohail Arana of neurology who told him he thinks he needs a pacemaker and that his low heart rate could be explaining some of his Parkinson's symptoms.  I put in a call to Dr. Arana and I am awaiting a response.  In the meantime reviewed the symptoms and the procedure with the patient.  At times he says he does have some lightheadedness and dizziness and wants the pacemaker again at other times is too nervous does not want to stay in hospital overnight and does not think he is having any symptoms different than the last few years.  Certainly no syncope and no definite near syncope.  Heart rate here was 48.  Blood pressure okay.  No recent change in medications.\par July 7, 2020.  Patient here in follow-up.  Remains in sinus bradycardia at 47 with an otherwise normal ECG except incomplete right bundle branch block.  Recent labs with cholesterol 209, HDL 68, .  Normal thyroid function.  Last week he felt dizzy for most of the week and most of the days on and off  somewhat lightheaded but not near syncopal, not vertigo.  This week however he feels perfectly back to himself so not that likely to be from conduction disease.  No other complaints.  He remains on Lipitor and Ranexa.

## 2020-07-07 NOTE — REVIEW OF SYSTEMS
[see HPI] : see HPI [Joint Pain] : joint pain [Dizziness] : dizziness [Negative] : Heme/Lymph [Shortness Of Breath] : no shortness of breath [Chest Pain] : no chest pain [Dyspnea on exertion] : not dyspnea during exertion [Cough] : no cough [Lower Ext Edema] : no extremity edema [Heartburn] : no heartburn [Abdominal Pain] : no abdominal pain [Skin: A Rash] : no rash: [Urinary Frequency] : no change in urinary frequency

## 2020-07-07 NOTE — PHYSICAL EXAM
[General Appearance - Well Developed] : well developed [Normal Appearance] : normal appearance [General Appearance - Well Nourished] : well nourished [Well Groomed] : well groomed [General Appearance - In No Acute Distress] : no acute distress [No Deformities] : no deformities [Normal Conjunctiva] : the conjunctiva exhibited no abnormalities [Normal Oral Mucosa] : normal oral mucosa [Eyelids - No Xanthelasma] : the eyelids demonstrated no xanthelasmas [Normal Jugular Venous A Waves Present] : normal jugular venous A waves present [No Oral Pallor] : no oral pallor [No Oral Cyanosis] : no oral cyanosis [Normal Jugular Venous V Waves Present] : normal jugular venous V waves present [No Jugular Venous Wasserman A Waves] : no jugular venous wasserman A waves [Exaggerated Use Of Accessory Muscles For Inspiration] : no accessory muscle use [Respiration, Rhythm And Depth] : normal respiratory rhythm and effort [Heart Sounds] : normal S1 and S2 [Heart Rate And Rhythm] : heart rate and rhythm were normal [Auscultation Breath Sounds / Voice Sounds] : lungs were clear to auscultation bilaterally [Murmurs] : no murmurs present [Regular-Premature Beats] : the rhythm was regular with premature beats [Bradycardic ___] : the heart rate was bradycardic at [unfilled] bpm [Abdomen Tenderness] : non-tender [Abdomen Soft] : soft [Abdomen Mass (___ Cm)] : no abdominal mass palpated [Abnormal Walk] : normal gait [Gait - Sufficient For Exercise Testing] : the gait was sufficient for exercise testing [Nail Clubbing] : no clubbing of the fingernails [Cyanosis, Localized] : no localized cyanosis [Petechial Hemorrhages (___cm)] : no petechial hemorrhages [] : no rash [Skin Color & Pigmentation] : normal skin color and pigmentation [No Venous Stasis] : no venous stasis [Skin Lesions] : no skin lesions [No Skin Ulcers] : no skin ulcer [No Xanthoma] : no  xanthoma was observed [Affect] : the affect was normal [Oriented To Time, Place, And Person] : oriented to person, place, and time [Mood] : the mood was normal [FreeTextEntry1] : No edema. Pulses 2+ bilaterally, symmetric including pedal. No tremor

## 2020-07-07 NOTE — REASON FOR VISIT
[FreeTextEntry1] : The patient is a 74-year-old male with a history of angina with nonobstructive coronary artery disease,  and sinus node dysfunction and iron deficiency anemia here because of low heart rate.

## 2020-08-03 ENCOUNTER — RX RENEWAL (OUTPATIENT)
Age: 75
End: 2020-08-03

## 2020-08-17 ENCOUNTER — RX RENEWAL (OUTPATIENT)
Age: 75
End: 2020-08-17

## 2020-09-17 ENCOUNTER — RX RENEWAL (OUTPATIENT)
Age: 75
End: 2020-09-17

## 2020-11-04 ENCOUNTER — APPOINTMENT (OUTPATIENT)
Dept: INTERNAL MEDICINE | Facility: CLINIC | Age: 75
End: 2020-11-04
Payer: MEDICARE

## 2020-11-04 PROCEDURE — G0008: CPT

## 2020-11-04 PROCEDURE — 90662 IIV NO PRSV INCREASED AG IM: CPT

## 2021-01-04 ENCOUNTER — OUTPATIENT (OUTPATIENT)
Dept: OUTPATIENT SERVICES | Facility: HOSPITAL | Age: 76
LOS: 1 days | Discharge: HOME | End: 2021-01-04

## 2021-01-04 DIAGNOSIS — D50.9 IRON DEFICIENCY ANEMIA, UNSPECIFIED: ICD-10-CM

## 2021-01-04 DIAGNOSIS — Z87.09 PERSONAL HISTORY OF OTHER DISEASES OF THE RESPIRATORY SYSTEM: Chronic | ICD-10-CM

## 2021-01-07 ENCOUNTER — APPOINTMENT (OUTPATIENT)
Dept: HEMATOLOGY ONCOLOGY | Facility: CLINIC | Age: 76
End: 2021-01-07
Payer: MEDICARE

## 2021-01-07 VITALS
WEIGHT: 178.55 LBS | OXYGEN SATURATION: 100 % | HEIGHT: 68.46 IN | BODY MASS INDEX: 26.75 KG/M2 | RESPIRATION RATE: 16 BRPM | SYSTOLIC BLOOD PRESSURE: 123 MMHG | TEMPERATURE: 97.6 F | DIASTOLIC BLOOD PRESSURE: 78 MMHG | HEART RATE: 46 BPM

## 2021-01-07 PROCEDURE — 99214 OFFICE O/P EST MOD 30 MIN: CPT

## 2021-01-07 RX ORDER — ROPINIROLE 1 MG/1
1 TABLET, FILM COATED ORAL
Refills: 0 | Status: DISCONTINUED | COMMUNITY
Start: 2019-11-21 | End: 2021-01-07

## 2021-01-07 RX ORDER — ALBUTEROL SULFATE 90 UG/1
108 (90 BASE) INHALANT RESPIRATORY (INHALATION)
Qty: 1 | Refills: 3 | Status: DISCONTINUED | OUTPATIENT
Start: 2020-03-11 | End: 2021-01-07

## 2021-01-11 NOTE — ASSESSMENT
[Supportive] : Goals of care discussed with patient: Supportive [Palliative Care Plan] : not applicable at this time [FreeTextEntry1] : treated by Dr JESUS Arana for Parkinson's Disease. He he taking carbidopa 0.25 /levodopa  100 and rasagiline 1 mg PO daily and oral iron 65 mg PO three times weekly and he exercises regularity. He has not required transfusion and he has had good exercise tolerance for the anemia and Parkinson disease.\par I will obviate blood testing today; there is no bleeding and he has cut back on oral iron. I will request blood testing in February prior to him seeing his cardiologist and obtain a CBC (nearly normal HGB in June 2020). fasting lipid and comprehensive panel ferritin iron testing will be requested\par RTC 6 months

## 2021-01-11 NOTE — HISTORY OF PRESENT ILLNESS
[Disease:__________________________] : Disease: [unfilled] [Date: ____________] : Patient's last distress assessment performed on [unfilled]. [0 - No Distress] : Distress Level: 0 [de-identified] : Hai Perales is a 75 year old male presenting to the office for hematologic evaluation. He is referred here by Dr. Kurtis Rossi (internal medicine). Patient was noted have the following lab values from last month: hgb 6.8, ferritin 6, serum iron 15, iron saturation 4%. He admitted to having a history of iron deficiency anemia for the last 5 years. He admitted to oral iron supplementation but denied eating red meats. He denied receiving blood transfusions and intravenous iron in the past.   [FreeTextEntry1] : iron 65 mg [de-identified] : Hai has been diagnosed to have Parkinson's Disease. He had no significant symptoms except for night tremor.No hospitalzation.\par He has been prancing social distancing and wearing a mask\par wife had a non malignant lesion in the spinal cord meningoma (Dr Marie at [facility]) resection cervical region

## 2021-02-15 ENCOUNTER — RX RENEWAL (OUTPATIENT)
Age: 76
End: 2021-02-15

## 2021-02-23 ENCOUNTER — OUTPATIENT (OUTPATIENT)
Dept: OUTPATIENT SERVICES | Facility: HOSPITAL | Age: 76
LOS: 1 days | Discharge: HOME | End: 2021-02-23

## 2021-02-23 DIAGNOSIS — D50.9 IRON DEFICIENCY ANEMIA, UNSPECIFIED: ICD-10-CM

## 2021-02-23 DIAGNOSIS — Z87.09 PERSONAL HISTORY OF OTHER DISEASES OF THE RESPIRATORY SYSTEM: Chronic | ICD-10-CM

## 2021-02-24 ENCOUNTER — RESULT REVIEW (OUTPATIENT)
Age: 76
End: 2021-02-24

## 2021-02-24 ENCOUNTER — APPOINTMENT (OUTPATIENT)
Dept: HEMATOLOGY ONCOLOGY | Facility: CLINIC | Age: 76
End: 2021-02-24
Payer: MEDICARE

## 2021-02-24 VITALS
OXYGEN SATURATION: 100 % | WEIGHT: 171.98 LBS | BODY MASS INDEX: 25.8 KG/M2 | DIASTOLIC BLOOD PRESSURE: 82 MMHG | SYSTOLIC BLOOD PRESSURE: 129 MMHG | RESPIRATION RATE: 16 BRPM | TEMPERATURE: 97.2 F | HEART RATE: 49 BPM

## 2021-02-24 LAB
ALBUMIN SERPL ELPH-MCNC: 4.2 G/DL
ALP BLD-CCNC: 71 U/L
ALT SERPL-CCNC: 16 U/L
ANION GAP SERPL CALC-SCNC: 10 MMOL/L
AST SERPL-CCNC: 15 U/L
BASOPHILS # BLD AUTO: 0.04 K/UL — SIGNIFICANT CHANGE UP (ref 0–0.2)
BASOPHILS NFR BLD AUTO: 0.8 % — SIGNIFICANT CHANGE UP (ref 0–2)
BILIRUB SERPL-MCNC: 0.4 MG/DL
BUN SERPL-MCNC: 17 MG/DL
CALCIUM SERPL-MCNC: 8.8 MG/DL
CHLORIDE SERPL-SCNC: 99 MMOL/L
CHOLEST SERPL-MCNC: 215 MG/DL
CO2 SERPL-SCNC: 26 MMOL/L
CREAT SERPL-MCNC: 1.21 MG/DL
EOSINOPHIL # BLD AUTO: 0.27 K/UL — SIGNIFICANT CHANGE UP (ref 0–0.5)
EOSINOPHIL NFR BLD AUTO: 5.7 % — SIGNIFICANT CHANGE UP (ref 0–6)
FERRITIN SERPL-MCNC: 26 NG/ML
GLUCOSE SERPL-MCNC: 106 MG/DL
HCT VFR BLD CALC: 36.2 % — LOW (ref 39–50)
HDLC SERPL-MCNC: 72 MG/DL
HGB BLD-MCNC: 12.2 G/DL — LOW (ref 13–17)
IMM GRANULOCYTES NFR BLD AUTO: 0.4 % — SIGNIFICANT CHANGE UP (ref 0–1.5)
IRON SATN MFR SERPL: 21 %
IRON SERPL-MCNC: 73 UG/DL
LDLC SERPL CALC-MCNC: 121 MG/DL
LYMPHOCYTES # BLD AUTO: 1.17 K/UL — SIGNIFICANT CHANGE UP (ref 1–3.3)
LYMPHOCYTES # BLD AUTO: 24.6 % — SIGNIFICANT CHANGE UP (ref 13–44)
MCHC RBC-ENTMCNC: 29.4 PG — SIGNIFICANT CHANGE UP (ref 27–34)
MCHC RBC-ENTMCNC: 33.7 G/DL — SIGNIFICANT CHANGE UP (ref 32–36)
MCV RBC AUTO: 87.2 FL — SIGNIFICANT CHANGE UP (ref 80–100)
MONOCYTES # BLD AUTO: 0.52 K/UL — SIGNIFICANT CHANGE UP (ref 0–0.9)
MONOCYTES NFR BLD AUTO: 10.9 % — SIGNIFICANT CHANGE UP (ref 2–14)
NEUTROPHILS # BLD AUTO: 2.73 K/UL — SIGNIFICANT CHANGE UP (ref 1.8–7.4)
NEUTROPHILS NFR BLD AUTO: 57.6 % — SIGNIFICANT CHANGE UP (ref 43–77)
NONHDLC SERPL-MCNC: 142 MG/DL
NRBC # BLD: 0 /100 WBCS — SIGNIFICANT CHANGE UP (ref 0–0)
PLATELET # BLD AUTO: 268 K/UL — SIGNIFICANT CHANGE UP (ref 150–400)
POTASSIUM SERPL-SCNC: 4.5 MMOL/L
PROT SERPL-MCNC: 6.6 G/DL
RBC # BLD: 4.15 M/UL — LOW (ref 4.2–5.8)
RBC # FLD: 12.4 % — SIGNIFICANT CHANGE UP (ref 10.3–14.5)
SODIUM SERPL-SCNC: 135 MMOL/L
TIBC SERPL-MCNC: 342 UG/DL
TRIGL SERPL-MCNC: 107 MG/DL
UIBC SERPL-MCNC: 269 UG/DL
WBC # BLD: 4.75 K/UL — SIGNIFICANT CHANGE UP (ref 3.8–10.5)
WBC # FLD AUTO: 4.75 K/UL — SIGNIFICANT CHANGE UP (ref 3.8–10.5)

## 2021-02-24 PROCEDURE — 99214 OFFICE O/P EST MOD 30 MIN: CPT

## 2021-02-24 NOTE — END OF VISIT
Spoke with LISSETT Marshall regarding patient's follow up appointment tomorrow.  would like to consolidate appointments and patient is due to come back next week for biopsy and ID visit. Tacrolimus level has been stable throughout hospitalization and no recent dose adjustments made. Per NP, okay to cancel follow up tomorrow and continue with appointments scheduled next week.  updated on this and reports that she will notify patient's .    [Time Spent: ___ minutes] : I have spent [unfilled] minutes of time on the encounter. [>50% of the face to face encounter time was spent on counseling and/or coordination of care for ___] : Greater than 50% of the face to face encounter time was spent on counseling and/or coordination of care for [unfilled]

## 2021-02-26 NOTE — ASSESSMENT
[Supportive] : Goals of care discussed with patient: Supportive [Palliative Care Plan] : not applicable at this time [FreeTextEntry1] : MARYBETH Landry is a patient with a history of iron deficiency anemia partially on the basis of a dietary factor. THe HGB is not low enough to account for his symptoms but I would advise him to continue with the oral iron daily. His cholesterol level was discussed with him and he will make dietary adjustments.\par  Overall management of his conditions discussed and he will continue with the physical therapy.\par Patient seen with A Hai MENDOZA\par RTC 4 months

## 2021-02-26 NOTE — RESULTS/DATA
[FreeTextEntry1] : patient given copy of blood test result. HGB 12.2 with normal MCV. \par normal platelet count and normal WBC count.\par I called the patient to inform him of the low ferritin 26; his cholesterol is mildly elevated at 215

## 2021-02-26 NOTE — HISTORY OF PRESENT ILLNESS
[Disease:__________________________] : Disease: [unfilled] [Cardiovascular] : Cardiovascular [Constitutional] : Constitutional [ENT] : ENT [Dermatologic] : Dermatologic [Endocrine] : Endocrine [Gastrointestinal] : Gastrointestinal [Genitourinary] : Genitourinary [Gynecologic] : Gynecologic [Infectious] : Infectious [Musculoskeletal] : Musculoskeletal [Neurologic] : Neurologic [Pain] : Pain [Pulmonary] : Pulmonary [Hematologic] : Hematologic [Date: ____________] : Patient's last distress assessment performed on [unfilled]. [1 - Distress Level] : Distress Level: 1 [de-identified] : Hai Perales is a 75 year old male presenting to the office for hematologic evaluation. He is referred here by Dr. Kurtis Rossi (internal medicine). Patient was noted have the following lab values from last month: hgb 6.8, ferritin 6, serum iron 15, iron saturation 4%. He admitted to having a history of iron deficiency anemia for the last 5 years. He admitted to oral iron supplementation but denied eating red meats. He denied receiving blood transfusions and intravenous iron in the past.   [FreeTextEntry1] : iron 65 mg [de-identified] : He has felt fatigue yesterday; some dizziness and no falls. No nausea and no vomiting. He has not had bleeding

## 2021-04-29 ENCOUNTER — OUTPATIENT (OUTPATIENT)
Dept: OUTPATIENT SERVICES | Facility: HOSPITAL | Age: 76
LOS: 1 days | Discharge: HOME | End: 2021-04-29

## 2021-04-29 DIAGNOSIS — Z87.09 PERSONAL HISTORY OF OTHER DISEASES OF THE RESPIRATORY SYSTEM: Chronic | ICD-10-CM

## 2021-04-29 DIAGNOSIS — D50.9 IRON DEFICIENCY ANEMIA, UNSPECIFIED: ICD-10-CM

## 2021-04-30 ENCOUNTER — RESULT REVIEW (OUTPATIENT)
Age: 76
End: 2021-04-30

## 2021-04-30 ENCOUNTER — APPOINTMENT (OUTPATIENT)
Dept: HEMATOLOGY ONCOLOGY | Facility: CLINIC | Age: 76
End: 2021-04-30

## 2021-04-30 LAB
ALBUMIN SERPL ELPH-MCNC: 4.3 G/DL
ALP BLD-CCNC: 75 U/L
ALT SERPL-CCNC: 11 U/L
ANION GAP SERPL CALC-SCNC: 10 MMOL/L
AST SERPL-CCNC: 16 U/L
BASOPHILS # BLD AUTO: 0.04 K/UL — SIGNIFICANT CHANGE UP (ref 0–0.2)
BASOPHILS NFR BLD AUTO: 0.7 % — SIGNIFICANT CHANGE UP (ref 0–2)
BILIRUB SERPL-MCNC: 0.4 MG/DL
BUN SERPL-MCNC: 18 MG/DL
CALCIUM SERPL-MCNC: 9 MG/DL
CHLORIDE SERPL-SCNC: 99 MMOL/L
CO2 SERPL-SCNC: 25 MMOL/L
CREAT SERPL-MCNC: 1.05 MG/DL
EOSINOPHIL # BLD AUTO: 0.31 K/UL — SIGNIFICANT CHANGE UP (ref 0–0.5)
EOSINOPHIL NFR BLD AUTO: 5.6 % — SIGNIFICANT CHANGE UP (ref 0–6)
FERRITIN SERPL-MCNC: 29 NG/ML
GLUCOSE SERPL-MCNC: 93 MG/DL
HCT VFR BLD CALC: 37.5 % — LOW (ref 39–50)
HGB BLD-MCNC: 12.7 G/DL — LOW (ref 13–17)
IMM GRANULOCYTES NFR BLD AUTO: 0.5 % — SIGNIFICANT CHANGE UP (ref 0–1.5)
IRON SATN MFR SERPL: 20 %
IRON SERPL-MCNC: 59 UG/DL
LYMPHOCYTES # BLD AUTO: 1.29 K/UL — SIGNIFICANT CHANGE UP (ref 1–3.3)
LYMPHOCYTES # BLD AUTO: 23.2 % — SIGNIFICANT CHANGE UP (ref 13–44)
MCHC RBC-ENTMCNC: 29.7 PG — SIGNIFICANT CHANGE UP (ref 27–34)
MCHC RBC-ENTMCNC: 33.9 G/DL — SIGNIFICANT CHANGE UP (ref 32–36)
MCV RBC AUTO: 87.8 FL — SIGNIFICANT CHANGE UP (ref 80–100)
MONOCYTES # BLD AUTO: 0.57 K/UL — SIGNIFICANT CHANGE UP (ref 0–0.9)
MONOCYTES NFR BLD AUTO: 10.2 % — SIGNIFICANT CHANGE UP (ref 2–14)
NEUTROPHILS # BLD AUTO: 3.33 K/UL — SIGNIFICANT CHANGE UP (ref 1.8–7.4)
NEUTROPHILS NFR BLD AUTO: 59.8 % — SIGNIFICANT CHANGE UP (ref 43–77)
NRBC # BLD: 0 /100 WBCS — SIGNIFICANT CHANGE UP (ref 0–0)
PLATELET # BLD AUTO: 274 K/UL — SIGNIFICANT CHANGE UP (ref 150–400)
POTASSIUM SERPL-SCNC: 4.7 MMOL/L
PROT SERPL-MCNC: 6.6 G/DL
RBC # BLD: 4.27 M/UL — SIGNIFICANT CHANGE UP (ref 4.2–5.8)
RBC # FLD: 13.6 % — SIGNIFICANT CHANGE UP (ref 10.3–14.5)
SODIUM SERPL-SCNC: 134 MMOL/L
TIBC SERPL-MCNC: 301 UG/DL
UIBC SERPL-MCNC: 242 UG/DL
WBC # BLD: 5.57 K/UL — SIGNIFICANT CHANGE UP (ref 3.8–10.5)
WBC # FLD AUTO: 5.57 K/UL — SIGNIFICANT CHANGE UP (ref 3.8–10.5)

## 2021-06-20 ENCOUNTER — RX RENEWAL (OUTPATIENT)
Age: 76
End: 2021-06-20

## 2021-06-21 ENCOUNTER — OUTPATIENT (OUTPATIENT)
Dept: OUTPATIENT SERVICES | Facility: HOSPITAL | Age: 76
LOS: 1 days | Discharge: HOME | End: 2021-06-21

## 2021-06-21 DIAGNOSIS — D50.9 IRON DEFICIENCY ANEMIA, UNSPECIFIED: ICD-10-CM

## 2021-06-21 DIAGNOSIS — Z87.09 PERSONAL HISTORY OF OTHER DISEASES OF THE RESPIRATORY SYSTEM: Chronic | ICD-10-CM

## 2021-06-23 ENCOUNTER — APPOINTMENT (OUTPATIENT)
Dept: HEMATOLOGY ONCOLOGY | Facility: CLINIC | Age: 76
End: 2021-06-23

## 2021-06-23 NOTE — HISTORY OF PRESENT ILLNESS
[Disease:__________________________] : Disease: [unfilled] [de-identified] : Hai Perales is a 75 year old male presenting to the office for hematologic evaluation. He is referred here by Dr. Kurtis Rossi (internal medicine). Patient was noted have the following lab values from last month: hgb 6.8, ferritin 6, serum iron 15, iron saturation 4%. He admitted to having a history of iron deficiency anemia for the last 5 years. He admitted to oral iron supplementation but denied eating red meats. He denied receiving blood transfusions and intravenous iron in the past.   [FreeTextEntry1] : iron 65 mg [de-identified] : Last Ferritin 4/30 29  [Cardiovascular] : Cardiovascular [Constitutional] : Constitutional [ENT] : ENT [Dermatologic] : Dermatologic [Endocrine] : Endocrine [Gastrointestinal] : Gastrointestinal [Genitourinary] : Genitourinary [Gynecologic] : Gynecologic [Infectious] : Infectious [Musculoskeletal] : Musculoskeletal [Neurologic] : Neurologic [Pain] : Pain [Pulmonary] : Pulmonary [Hematologic] : Hematologic

## 2021-06-28 ENCOUNTER — RESULT REVIEW (OUTPATIENT)
Age: 76
End: 2021-06-28

## 2021-06-28 ENCOUNTER — APPOINTMENT (OUTPATIENT)
Dept: INTERNAL MEDICINE | Facility: CLINIC | Age: 76
End: 2021-06-28
Payer: MEDICARE

## 2021-06-28 VITALS
OXYGEN SATURATION: 99 % | HEART RATE: 76 BPM | WEIGHT: 176 LBS | HEIGHT: 68 IN | TEMPERATURE: 97.6 F | BODY MASS INDEX: 26.67 KG/M2

## 2021-06-28 DIAGNOSIS — R10.13 EPIGASTRIC PAIN: ICD-10-CM

## 2021-06-28 DIAGNOSIS — R10.31 RIGHT LOWER QUADRANT PAIN: ICD-10-CM

## 2021-06-28 PROCEDURE — 99214 OFFICE O/P EST MOD 30 MIN: CPT | Mod: 25

## 2021-06-28 PROCEDURE — 36415 COLL VENOUS BLD VENIPUNCTURE: CPT

## 2021-06-29 VITALS — DIASTOLIC BLOOD PRESSURE: 62 MMHG | SYSTOLIC BLOOD PRESSURE: 110 MMHG

## 2021-06-29 NOTE — ASSESSMENT
[FreeTextEntry1] : The patient has an upper abdominal pain after eating for six weeks.  He has lost about ten pounds.  Further evaluation is needed to evaluate for hepatic or pancreatic disease.  Also consider a gastric cause.  Will check a CBC, metabolic, amylase and lipase. Get a CT A/P.  Further evaluation after that.  Consider a PPI trial, consider a gastroenterology evaluation and EGD.\par \par He has a right inguinal hernia which is soft and reducible.  He was advised to see a surgeon.\par \par We discussed the Parkinson's disease.\par \par He has had the COVID-19 vaccine.  \par \par Check a CBC and iron studies for the iron deficiency.  He sees a hematologist.

## 2021-06-29 NOTE — PHYSICAL EXAM
[Normal] : no carotid or abdominal bruits heard, no varicosities, pedal pulses are present, no peripheral edema, no extremity clubbing or cyanosis and no palpable aorta [Soft] : abdomen soft [Non-distended] : non-distended [No HSM] : no HSM [Normal Bowel Sounds] : normal bowel sounds [de-identified] : mild midline abdominal tenderness superiot to the umbilicus.  No rebound or guarding. No HSM.  There is a reducible lump in the right inguinal area, nontender.

## 2021-06-29 NOTE — HISTORY OF PRESENT ILLNESS
[FreeTextEntry8] : The patient has two separate abdominal symptoms.  He has midline abdominal pain superior to the umbilicus which occurs soon after eating or while still eating for about six weeks, not getting worse. It lasts 15-20 minutes.  No fever, N/V, BRBPR, diarrhea, black stool.  he has constipation which is more chronic.  It is intermittent and not every time with eating.  No reflux symptoms, he hasn't taken any meds for it.  He is eating smaller portions, he says his appetite isn't as good and he has lost about ten pounds. \par \par He also has a pain in the right inguinal area. He doesn't notice swelling or lumps.  It isn't related to eating or GI symptoms.\par \par He is concerned because he has two friends who have been diagnosed with pancreatic cancer.  \par \par He is treated in [facility] for the Parkinson's disease.  He exercises and does PT.\par \par He has chronic LBP and he also is seen in [facility] for that.

## 2021-06-30 ENCOUNTER — APPOINTMENT (OUTPATIENT)
Dept: CT IMAGING | Facility: CLINIC | Age: 76
End: 2021-06-30
Payer: MEDICARE

## 2021-06-30 ENCOUNTER — OUTPATIENT (OUTPATIENT)
Dept: OUTPATIENT SERVICES | Facility: HOSPITAL | Age: 76
LOS: 1 days | End: 2021-06-30
Payer: MEDICARE

## 2021-06-30 DIAGNOSIS — Z87.09 PERSONAL HISTORY OF OTHER DISEASES OF THE RESPIRATORY SYSTEM: Chronic | ICD-10-CM

## 2021-06-30 DIAGNOSIS — R10.13 EPIGASTRIC PAIN: ICD-10-CM

## 2021-06-30 PROCEDURE — 74177 CT ABD & PELVIS W/CONTRAST: CPT | Mod: 26,MH

## 2021-06-30 PROCEDURE — 74177 CT ABD & PELVIS W/CONTRAST: CPT

## 2021-07-01 ENCOUNTER — NON-APPOINTMENT (OUTPATIENT)
Age: 76
End: 2021-07-01

## 2021-07-01 LAB
ALBUMIN SERPL ELPH-MCNC: 4.3 G/DL
ALP BLD-CCNC: 75 U/L
ALT SERPL-CCNC: 16 U/L
AMYLASE/CREAT SERPL: 52 U/L
ANION GAP SERPL CALC-SCNC: 9 MMOL/L
AST SERPL-CCNC: 17 U/L
BASOPHILS # BLD AUTO: 0.05 K/UL
BASOPHILS NFR BLD AUTO: 0.7 %
BILIRUB SERPL-MCNC: 0.3 MG/DL
BUN SERPL-MCNC: 17 MG/DL
CALCIUM SERPL-MCNC: 9.2 MG/DL
CHLORIDE SERPL-SCNC: 98 MMOL/L
CO2 SERPL-SCNC: 26 MMOL/L
CREAT SERPL-MCNC: 1.01 MG/DL
EOSINOPHIL # BLD AUTO: 0.41 K/UL
EOSINOPHIL NFR BLD AUTO: 5.8 %
FERRITIN SERPL-MCNC: 22 NG/ML
GLUCOSE SERPL-MCNC: 105 MG/DL
HCT VFR BLD CALC: 38 %
HGB BLD-MCNC: 12.2 G/DL
IMM GRANULOCYTES NFR BLD AUTO: 0.3 %
IRON SATN MFR SERPL: 16 %
IRON SERPL-MCNC: 55 UG/DL
LPL SERPL-CCNC: 40 U/L
LYMPHOCYTES # BLD AUTO: 1.34 K/UL
LYMPHOCYTES NFR BLD AUTO: 19.1 %
MAN DIFF?: NORMAL
MCHC RBC-ENTMCNC: 29.4 PG
MCHC RBC-ENTMCNC: 32.1 GM/DL
MCV RBC AUTO: 91.6 FL
MONOCYTES # BLD AUTO: 0.69 K/UL
MONOCYTES NFR BLD AUTO: 9.8 %
NEUTROPHILS # BLD AUTO: 4.51 K/UL
NEUTROPHILS NFR BLD AUTO: 64.3 %
PLATELET # BLD AUTO: 308 K/UL
POTASSIUM SERPL-SCNC: 4.8 MMOL/L
PROT SERPL-MCNC: 6.5 G/DL
RBC # BLD: 4.15 M/UL
RBC # FLD: 13 %
SODIUM SERPL-SCNC: 133 MMOL/L
TIBC SERPL-MCNC: 343 UG/DL
UIBC SERPL-MCNC: 288 UG/DL
WBC # FLD AUTO: 7.02 K/UL

## 2021-07-25 ENCOUNTER — RX RENEWAL (OUTPATIENT)
Age: 76
End: 2021-07-25

## 2021-08-06 ENCOUNTER — APPOINTMENT (OUTPATIENT)
Dept: INTERNAL MEDICINE | Facility: CLINIC | Age: 76
End: 2021-08-06
Payer: MEDICARE

## 2021-08-06 VITALS
TEMPERATURE: 96.26 F | WEIGHT: 175 LBS | BODY MASS INDEX: 26.52 KG/M2 | HEART RATE: 69 BPM | OXYGEN SATURATION: 99 % | HEIGHT: 68 IN

## 2021-08-06 DIAGNOSIS — Z01.818 ENCOUNTER FOR OTHER PREPROCEDURAL EXAMINATION: ICD-10-CM

## 2021-08-06 PROCEDURE — 93000 ELECTROCARDIOGRAM COMPLETE: CPT

## 2021-08-06 PROCEDURE — 99214 OFFICE O/P EST MOD 30 MIN: CPT | Mod: 25

## 2021-08-06 RX ORDER — RASAGILINE 0.5 MG/1
0.5 TABLET ORAL
Refills: 0 | Status: DISCONTINUED | COMMUNITY
Start: 2020-06-03 | End: 2021-08-06

## 2021-08-09 ENCOUNTER — APPOINTMENT (OUTPATIENT)
Dept: CARDIOLOGY | Facility: CLINIC | Age: 76
End: 2021-08-09
Payer: MEDICARE

## 2021-08-09 VITALS
DIASTOLIC BLOOD PRESSURE: 71 MMHG | OXYGEN SATURATION: 100 % | WEIGHT: 173 LBS | BODY MASS INDEX: 26.22 KG/M2 | SYSTOLIC BLOOD PRESSURE: 110 MMHG | HEART RATE: 51 BPM | HEIGHT: 68 IN

## 2021-08-09 VITALS — SYSTOLIC BLOOD PRESSURE: 146 MMHG | DIASTOLIC BLOOD PRESSURE: 88 MMHG | HEART RATE: 48 BPM

## 2021-08-09 VITALS — DIASTOLIC BLOOD PRESSURE: 72 MMHG | HEART RATE: 48 BPM | SYSTOLIC BLOOD PRESSURE: 122 MMHG

## 2021-08-09 VITALS — SYSTOLIC BLOOD PRESSURE: 110 MMHG | DIASTOLIC BLOOD PRESSURE: 65 MMHG

## 2021-08-09 PROBLEM — Z01.818 PREOPERATIVE EXAMINATION: Status: ACTIVE | Noted: 2021-08-09

## 2021-08-09 PROCEDURE — 99215 OFFICE O/P EST HI 40 MIN: CPT

## 2021-08-09 NOTE — RESULTS/DATA
[] : results reviewed [de-identified] : H/H 11.8/35.6 [de-identified] : WNL [de-identified] : sodium 134 [de-identified] : SB 41, question of changes in III and aVF.

## 2021-08-09 NOTE — REVIEW OF SYSTEMS
[Feeling Fatigued] : feeling fatigued [Weight Loss (___ Lbs)] : [unfilled] ~Ulb weight loss [Dyspnea on exertion] : not dyspnea during exertion [Chest Discomfort] : no chest discomfort [Lower Ext Edema] : no extremity edema [Palpitations] : no palpitations [Syncope] : no syncope [Abdominal Pain] : abdominal pain [Depression] : depression [Suicidal] : not suicidal [Negative] : Heme/Lymph [FreeTextEntry7] : see HPI [de-identified] : Parkinsonism and some orthostasis [de-identified] : Seems a little depressed

## 2021-08-09 NOTE — PHYSICAL EXAM
[General Appearance - Well Developed] : well developed [Normal Appearance] : normal appearance [Well Groomed] : well groomed [General Appearance - Well Nourished] : well nourished [No Deformities] : no deformities [General Appearance - In No Acute Distress] : no acute distress [Normal Conjunctiva] : the conjunctiva exhibited no abnormalities [Eyelids - No Xanthelasma] : the eyelids demonstrated no xanthelasmas [Normal Oral Mucosa] : normal oral mucosa [No Oral Pallor] : no oral pallor [No Oral Cyanosis] : no oral cyanosis [Normal Jugular Venous A Waves Present] : normal jugular venous A waves present [Normal Jugular Venous V Waves Present] : normal jugular venous V waves present [No Jugular Venous Wasserman A Waves] : no jugular venous wasserman A waves [Respiration, Rhythm And Depth] : normal respiratory rhythm and effort [Exaggerated Use Of Accessory Muscles For Inspiration] : no accessory muscle use [Auscultation Breath Sounds / Voice Sounds] : lungs were clear to auscultation bilaterally [Heart Rate And Rhythm] : heart rate and rhythm were normal [Heart Sounds] : normal S1 and S2 [Murmurs] : no murmurs present [Bradycardic ___] : the heart rate was bradycardic at [unfilled] bpm [Regular-Premature Beats] : the rhythm was regular with premature beats [Abdomen Soft] : soft [Abdomen Tenderness] : non-tender [Abdomen Mass (___ Cm)] : no abdominal mass palpated [Abnormal Walk] : normal gait [Gait - Sufficient For Exercise Testing] : the gait was sufficient for exercise testing [Nail Clubbing] : no clubbing of the fingernails [Cyanosis, Localized] : no localized cyanosis [Petechial Hemorrhages (___cm)] : no petechial hemorrhages [Skin Color & Pigmentation] : normal skin color and pigmentation [] : no rash [No Venous Stasis] : no venous stasis [Skin Lesions] : no skin lesions [No Skin Ulcers] : no skin ulcer [No Xanthoma] : no  xanthoma was observed [Oriented To Time, Place, And Person] : oriented to person, place, and time [Affect] : the affect was normal [Mood] : the mood was normal [FreeTextEntry1] : Seems a little depressed, and maybe a little anxious.

## 2021-08-09 NOTE — HISTORY OF PRESENT ILLNESS
[FreeTextEntry1] : hernia repair [FreeTextEntry2] : 8/11/21 [FreeTextEntry3] : Dr. Mancera [FreeTextEntry4] : The patient is here for a medical clearance prior to planned hernia repair.  He has two abdominal wall hernias to be repaired on 8/11/21.\par \par he is active and can walk 0.5-1 mile at a moderate pace without difficulty.  He also swims.  He denies chest pain or dyspnea with exertion.  He denies orthopnea or leg edema.  He hasn't needed to take nitroglycerin in at least 1-2 years.\par \par No new GI or  symptoms.

## 2021-08-09 NOTE — PHYSICAL EXAM
[Normal S1, S2] : normal S1 and S2 [Normal] : soft, non-tender, non-distended, no masses palpated, no HSM and normal bowel sounds [de-identified] : bradycardic [de-identified] : mild Parkinsonian symptoms without change

## 2021-08-09 NOTE — DISCUSSION/SUMMARY
[Procedure Intermediate Risk] : the procedure risk is intermediate [Patient Intermediate Risk] : the patient is an intermediate risk [Optimized for Surgery] : the patient is optimized for surgery [As per surgery] : as per surgery [Continue] : Continue medications as currently directed [FreeTextEntry1] : The patient has stable angina, no congestive heart failure, and a mildly abnormal but unchanged EKG both in terms of his sinus bradycardia at rest and his mild ST abnormalities.  He does have some orthostasis probably from his Parkinson's so volume depletion should be avoided.  I do not believe he needs any further cardiac work-up at this time and should not be considered to be a high risk for the upcoming surgery and anesthesia.

## 2021-08-09 NOTE — ASSESSMENT
[FreeTextEntry4] : The patient is here for a medical clearance prior to planned hernia repairs next week.  He has no symptoms to suggest cardiac ischemia and he has fairly good exercise tolerance.  However, he is significantly bradycardic which is chronic.  There is a question of a slight EKG change.  He is on Ranexa and he has needed nitroglycerin in the past.  Given these issues, we agreed he would see his cardiologist prior to the surgery for a cardiac clearance (he is due for a routine visit anyway.)  He understands and agrees.\par \par He can't take a beta -blocker given the bradycardia.\par \par He hasn't been taking the statin which was again advised and he now agrees.  \par \par He has a mild stable anemia. Other presurgical blood tests unremarkable.\par \par He has Parkinson's disease with stable symptoms.\par \par He was reminded to avoid aspirin and NSAIDS for ten days prior to the procedure.  \par \par He can use an incentive spirometer after the surgery.\par \par Routine DVT prophylaxis advised as per the surgeon.  \par \par He has had the COVID-19 vaccine and says COVID-19 testing is planned prior to surgery. \par \par

## 2021-08-09 NOTE — HISTORY OF PRESENT ILLNESS
[FreeTextEntry1] : In 2012 the patient presented with exertional chest burning. Abnormal stress echocardiogram. Cardiac catheterization revealed nonobstructive coronary disease (only 40% distal LAD). He been maintained on Ranexa with good relief of his symptoms. \par December 6, 2016. The last couple weeks the patient has been getting his chest tightness, primarily at rest when he stressed. He's had to use nitroglycerin at least once a week. He's had no chest discomfort when he walks on the treadmill or exercise in the gym. Patient also feels slightly lightheaded once in a great while. He usually is sitting when it occurs. He does not have it when he is walking or standing.\par Stress echo on December 16 was negative and the echo again showed mild to moderate MR, unchanged.\par December 4, 2017. One year since last visit. Symptoms, maybe once a week, and usually related to emotional upset and stress. Responds to nitroglycerin within 5 minutes. He never increased his Ranexa. He was complaining about the cost of Ranexa and we discussed the rationale for Ranexa versus other medications in his case. Beta blockers must be avoided given his sinus bradycardia. He had labs with Dr. Rossi on September 28. He remains with iron deficiency anemia, but he had a negative GI workup in 2014. Since then he has increased his iron from once a day to twice a day. His lipids had a cholesterol of 147, HDL 70, LDL 63, triglycerides 68, and he remains on atorvastatin. His hemoglobin A1c was 5.5, and the rest of his labs were unremarkable. He had a flu shot with Dr. Rossi.\par April 24, 2018. Patient has recently been noticing that he is getting more short of breath with exertion with some chest pressure. Can barely walk a block or 2. 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 The orthopedist wants to give him a synthetic cartilage injection and he is seeing a vascular doctor later this week because of the swelling in his legs, which is a little more on the right side, where his knee is acting up. He said he walks and feels like an old man. EKG is sinus bradycardia and unchanged. Found to have severe iron deficiency anemia and referred to GI.\par June 7, 2018. The patient returns in followup. Had colonoscopy and endoscopy that were mostly negative. Still considering capsule study. In the meantime, he is on iron twice a day, and his levels have come up. He had been transfused 3 units at the beginning. He still has some orthostatic dizziness at times, but otherwise most of his symptoms are gone. He is getting ready for a trip to [facility]. His EKG remains with sinus bradycardia, and some nonspecific ST changes, but unchanged.\par October 9, 2018. Patient was at hematology on October 5, and a heart rate of 42, was recorded. I was told he had a low heart rate and needed an appointment and he was scheduled for today. Now the patient says when he called he told them he was dizzy and having symptoms, which was not related to me. Here, he is in sinus bradycardia at 46, with mild ST depressions V4 through 6 as well as lead II and aVF. His dizziness is mostly when he changes positions that'll last for a few seconds, and then passes. He is not limited in his exercise capacity and does not quite get near syncopal. No other complaints. Able to walk 20 minutes the other day without stopping.\par November 21, 2019.  First visit in over a year.  Slightly depressed over diagnosis of Parkinson's but so far mild case and doing well.  On Azilect 0.5 twice daily and resagiline 1 mg daily.  Heart rate still slow at 41 but no syncope or near syncope.  Occasionally feels a little dizzy sitting or walking but does not sound like near syncope.  Changed his atorvastatin to every other day on his own but has not had lipids since October of last year with me.  He will have Dr. Rossi added to the blood test when he sees him in a few weeks.  Hemoglobin and hematocrit and iron levels have been holding steady.  No chest pain or shortness of breath.  Considering some experimental options for the Parkinson's going forward.\par December 9, 2019.  Patient called and wanted to come in.  Saw Dr. Sohail Arana of neurology who told him he thinks he needs a pacemaker and that his low heart rate could be explaining some of his Parkinson's symptoms.  I put in a call to Dr. Arana and I am awaiting a response.  In the meantime reviewed the symptoms and the procedure with the patient.  At times he says he does have some lightheadedness and dizziness and wants the pacemaker again at other times is too nervous does not want to stay in hospital overnight and does not think he is having any symptoms different than the last few years.  Certainly no syncope and no definite near syncope.  Heart rate here was 48.  Blood pressure okay.  No recent change in medications.\par July 7, 2020.  Patient here in follow-up.  Remains in sinus bradycardia at 47 with an otherwise normal ECG except incomplete right bundle branch block.  Recent labs with cholesterol 209, HDL 68, .  Normal thyroid function.  Last week he felt dizzy for most of the week and most of the days on and off  somewhat lightheaded but not near syncopal, not vertigo.  This week however he feels perfectly back to himself so not that likely to be from conduction disease.  No other complaints.  He remains on Lipitor and Ranexa.\par \par August 9, 2021.  Patient here for preop cardiac evaluation due to an abnormal ECG.  Last here over a year ago.  Has been doing well for the most part but has developed mild Parkinson's.  Not having any issues with angina currently but upon pushing the history he does have some orthostatic dizziness now and then.  Even to the point now where he knows to stop and wait a few minutes when he gets out of the car etc. (Actually I had mentioned this in my note over a year ago and that his symptoms were probably orthostasis related to his Parkinson's.)  Here in fact there is a drop in his blood pressure from lying to sitting and is probably related to his parkinsonism.  Otherwise no symptoms or signs of heart failure unstable angina etc.  He always has a mildly abnormal ECG with some ST depressions in the inferior leads and V4 through 6 and the EKG done preop at [facility] was not significantly changed from his previous EKGs of over a year ago.  He will be having surgery on both an inguinal and an abdominal hernia by Dr. Bo.

## 2021-08-22 ENCOUNTER — RX RENEWAL (OUTPATIENT)
Age: 76
End: 2021-08-22

## 2021-09-10 ENCOUNTER — NON-APPOINTMENT (OUTPATIENT)
Age: 76
End: 2021-09-10

## 2021-09-10 ENCOUNTER — APPOINTMENT (OUTPATIENT)
Dept: CARDIOLOGY | Facility: CLINIC | Age: 76
End: 2021-09-10
Payer: MEDICARE

## 2021-09-10 ENCOUNTER — APPOINTMENT (OUTPATIENT)
Dept: INTERNAL MEDICINE | Facility: CLINIC | Age: 76
End: 2021-09-10
Payer: MEDICARE

## 2021-09-10 VITALS
HEIGHT: 68 IN | HEART RATE: 55 BPM | WEIGHT: 169 LBS | BODY MASS INDEX: 25.61 KG/M2 | OXYGEN SATURATION: 99 % | TEMPERATURE: 97.8 F

## 2021-09-10 VITALS
OXYGEN SATURATION: 100 % | SYSTOLIC BLOOD PRESSURE: 110 MMHG | HEART RATE: 53 BPM | WEIGHT: 169 LBS | BODY MASS INDEX: 25.7 KG/M2 | DIASTOLIC BLOOD PRESSURE: 70 MMHG

## 2021-09-10 VITALS — SYSTOLIC BLOOD PRESSURE: 115 MMHG | DIASTOLIC BLOOD PRESSURE: 80 MMHG

## 2021-09-10 DIAGNOSIS — R07.9 CHEST PAIN, UNSPECIFIED: ICD-10-CM

## 2021-09-10 PROCEDURE — 99215 OFFICE O/P EST HI 40 MIN: CPT

## 2021-09-10 PROCEDURE — 93000 ELECTROCARDIOGRAM COMPLETE: CPT

## 2021-09-10 PROCEDURE — 99214 OFFICE O/P EST MOD 30 MIN: CPT | Mod: 25

## 2021-09-10 PROCEDURE — 36415 COLL VENOUS BLD VENIPUNCTURE: CPT

## 2021-09-10 NOTE — REASON FOR VISIT
[FreeTextEntry1] : The patient is a 75-year-old male with a history of angina with nonobstructive coronary artery disease,  and sinus node dysfunction and iron deficiency anemia here because of chest pain.

## 2021-09-10 NOTE — REVIEW OF SYSTEMS
[Feeling Fatigued] : feeling fatigued [Weight Loss (___ Lbs)] : [unfilled] ~Ulb weight loss [Dyspnea on exertion] : not dyspnea during exertion [Chest Discomfort] : chest discomfort [Lower Ext Edema] : no extremity edema [Palpitations] : no palpitations [Syncope] : no syncope [Abdominal Pain] : no abdominal pain [Depression] : depression [Suicidal] : not suicidal [Negative] : Psychiatric [de-identified] : Parkinsonism and some orthostasis [FreeTextEntry5] : see HPI [de-identified] : Seems a little depressed

## 2021-09-10 NOTE — HISTORY OF PRESENT ILLNESS
[FreeTextEntry1] : In 2012 the patient presented with exertional chest burning. Abnormal stress echocardiogram. Cardiac catheterization revealed nonobstructive coronary disease (only 40% distal LAD). He been maintained on Ranexa with good relief of his symptoms. \par December 6, 2016. The last couple weeks the patient has been getting his chest tightness, primarily at rest when he stressed. He's had to use nitroglycerin at least once a week. He's had no chest discomfort when he walks on the treadmill or exercise in the gym. Patient also feels slightly lightheaded once in a great while. He usually is sitting when it occurs. He does not have it when he is walking or standing.\par Stress echo on December 16 was negative and the echo again showed mild to moderate MR, unchanged.\par December 4, 2017. One year since last visit. Symptoms, maybe once a week, and usually related to emotional upset and stress. Responds to nitroglycerin within 5 minutes. He never increased his Ranexa. He was complaining about the cost of Ranexa and we discussed the rationale for Ranexa versus other medications in his case. Beta blockers must be avoided given his sinus bradycardia. He had labs with Dr. Rossi on September 28. He remains with iron deficiency anemia, but he had a negative GI workup in 2014. Since then he has increased his iron from once a day to twice a day. His lipids had a cholesterol of 147, HDL 70, LDL 63, triglycerides 68, and he remains on atorvastatin. His hemoglobin A1c was 5.5, and the rest of his labs were unremarkable. He had a flu shot with Dr. Rossi.\par April 24, 2018. Patient has recently been noticing that he is getting more short of breath with exertion with some chest pressure. Can barely walk a block or 2. 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 The orthopedist wants to give him a synthetic cartilage injection and he is seeing a vascular doctor later this week because of the swelling in his legs, which is a little more on the right side, where his knee is acting up. He said he walks and feels like an old man. EKG is sinus bradycardia and unchanged. Found to have severe iron deficiency anemia and referred to GI.\par June 7, 2018. The patient returns in followup. Had colonoscopy and endoscopy that were mostly negative. Still considering capsule study. In the meantime, he is on iron twice a day, and his levels have come up. He had been transfused 3 units at the beginning. He still has some orthostatic dizziness at times, but otherwise most of his symptoms are gone. He is getting ready for a trip to [facility]. His EKG remains with sinus bradycardia, and some nonspecific ST changes, but unchanged.\par October 9, 2018. Patient was at hematology on October 5, and a heart rate of 42, was recorded. I was told he had a low heart rate and needed an appointment and he was scheduled for today. Now the patient says when he called he told them he was dizzy and having symptoms, which was not related to me. Here, he is in sinus bradycardia at 46, with mild ST depressions V4 through 6 as well as lead II and aVF. His dizziness is mostly when he changes positions that'll last for a few seconds, and then passes. He is not limited in his exercise capacity and does not quite get near syncopal. No other complaints. Able to walk 20 minutes the other day without stopping.\par November 21, 2019.  First visit in over a year.  Slightly depressed over diagnosis of Parkinson's but so far mild case and doing well.  On Azilect 0.5 twice daily and resagiline 1 mg daily.  Heart rate still slow at 41 but no syncope or near syncope.  Occasionally feels a little dizzy sitting or walking but does not sound like near syncope.  Changed his atorvastatin to every other day on his own but has not had lipids since October of last year with me.  He will have Dr. Rossi added to the blood test when he sees him in a few weeks.  Hemoglobin and hematocrit and iron levels have been holding steady.  No chest pain or shortness of breath.  Considering some experimental options for the Parkinson's going forward.\par December 9, 2019.  Patient called and wanted to come in.  Saw Dr. Sohail Arana of neurology who told him he thinks he needs a pacemaker and that his low heart rate could be explaining some of his Parkinson's symptoms.  I put in a call to Dr. Arana and I am awaiting a response.  In the meantime reviewed the symptoms and the procedure with the patient.  At times he says he does have some lightheadedness and dizziness and wants the pacemaker again at other times is too nervous does not want to stay in hospital overnight and does not think he is having any symptoms different than the last few years.  Certainly no syncope and no definite near syncope.  Heart rate here was 48.  Blood pressure okay.  No recent change in medications.\par July 7, 2020.  Patient here in follow-up.  Remains in sinus bradycardia at 47 with an otherwise normal ECG except incomplete right bundle branch block.  Recent labs with cholesterol 209, HDL 68, .  Normal thyroid function.  Last week he felt dizzy for most of the week and most of the days on and off  somewhat lightheaded but not near syncopal, not vertigo.  This week however he feels perfectly back to himself so not that likely to be from conduction disease.  No other complaints.  He remains on Lipitor and Ranexa.\par August 9, 2021.  Patient here for preop cardiac evaluation due to an abnormal ECG.  Last here over a year ago.  Has been doing well for the most part but has developed mild Parkinson's.  Not having any issues with angina currently but upon pushing the history he does have some orthostatic dizziness now and then.  Even to the point now where he knows to stop and wait a few minutes when he gets out of the car etc. (Actually I had mentioned this in my note over a year ago and that his symptoms were probably orthostasis related to his Parkinson's.)  Here in fact there is a drop in his blood pressure from lying to sitting and is probably related to his parkinsonism.  Otherwise no symptoms or signs of heart failure unstable angina etc.  He always has a mildly abnormal ECG with some ST depressions in the inferior leads and V4 through 6 and the EKG done preop at [place] was not significantly changed from his previous EKGs of over a year ago.  He will be having surgery on both an inguinal and an abdominal hernia by Dr. Bo.\par September 10, 2021.  Patient here for urgent visit.  Called yesterday with a burning chest pain and I was unavailable and was told to speak with or see his internist Dr. Rossi.  He saw Dr. Rossi this morning and EKG was totally unchanged with his usual sinus bradycardia and minimal ST depressions.  He is now here. His surgery and the recovery was a little more difficult. And now for the last week or so he has been having his usual exertional chest burning relieved with rest. It does not seem to be progressive. No signs that he is anemic again but he is more fatigued since the surgery. He also talked about his mild orthostasis which again is more likely related to the Parkinson's. After long discussion we decided that if his troponin is positive but he is stable we will schedule him for an angiogram next week, and if troponin is negative we will try to double up on the Ranexa and consider a stress test or a CT angio of his coronaries.

## 2021-09-10 NOTE — ASSESSMENT
[FreeTextEntry1] : The patient has chest pain in the last week which is typical for angina- it occurs with exertion, it is substernal and burning.  Cardiac ischemia must be considered and an appointment to see his cardiologist was arranged for today.  He is also anxious which could be the issue but a cardiac cause must be considered.  He will see his cardiologist today.  If the pain gets worse he should go to the ER.  \par \par He was counseled regarding the recent surgery.

## 2021-09-10 NOTE — HISTORY OF PRESENT ILLNESS
[de-identified] : The patient has symptoms for a week.  He reports that when he walks up stairs, walks uphill or walks a distance, he has a substernal chest burning that lasts about five minutes and resolves.  No symptoms at rest and it hasn't gotten worse.  He feels better today so far.  No dyspnea, palpitations, cough, fever, orthopnea, leg swelling, N/V, sweats.  No relationship to eating or position.   He feels he has been wheezing in the last week which he hasn't had in years- he used an inhaler which might have helped.  He hasn't tried nitroglycerin for it.\par \par He admits that he is under a lot of stress.  He had the hernia surgery recently and the recovery has been difficult.

## 2021-09-13 LAB
ALBUMIN SERPL ELPH-MCNC: 4.3 G/DL
ALP BLD-CCNC: 93 U/L
ALT SERPL-CCNC: 11 U/L
ANION GAP SERPL CALC-SCNC: 11 MMOL/L
AST SERPL-CCNC: 15 U/L
BASOPHILS # BLD AUTO: 0.07 K/UL
BASOPHILS NFR BLD AUTO: 1.5 %
BILIRUB SERPL-MCNC: 0.5 MG/DL
BUN SERPL-MCNC: 12 MG/DL
CALCIUM SERPL-MCNC: 9.3 MG/DL
CHLORIDE SERPL-SCNC: 96 MMOL/L
CHOLEST SERPL-MCNC: 157 MG/DL
CO2 SERPL-SCNC: 24 MMOL/L
CREAT SERPL-MCNC: 0.92 MG/DL
EOSINOPHIL # BLD AUTO: 0.39 K/UL
EOSINOPHIL NFR BLD AUTO: 8.2 %
ESTIMATED AVERAGE GLUCOSE: 114 MG/DL
GLUCOSE SERPL-MCNC: 148 MG/DL
HBA1C MFR BLD HPLC: 5.6 %
HCT VFR BLD CALC: 37.3 %
HDLC SERPL-MCNC: 64 MG/DL
HGB BLD-MCNC: 12.3 G/DL
IMM GRANULOCYTES NFR BLD AUTO: 0.2 %
LDLC SERPL CALC-MCNC: 72 MG/DL
LYMPHOCYTES # BLD AUTO: 1 K/UL
LYMPHOCYTES NFR BLD AUTO: 20.9 %
MAN DIFF?: NORMAL
MCHC RBC-ENTMCNC: 28.7 PG
MCHC RBC-ENTMCNC: 33 GM/DL
MCV RBC AUTO: 86.9 FL
MONOCYTES # BLD AUTO: 0.39 K/UL
MONOCYTES NFR BLD AUTO: 8.2 %
NEUTROPHILS # BLD AUTO: 2.92 K/UL
NEUTROPHILS NFR BLD AUTO: 61 %
NONHDLC SERPL-MCNC: 94 MG/DL
NT-PROBNP SERPL-MCNC: 157 PG/ML
PLATELET # BLD AUTO: 281 K/UL
POTASSIUM SERPL-SCNC: 4.5 MMOL/L
PROT SERPL-MCNC: 6.5 G/DL
RBC # BLD: 4.29 M/UL
RBC # FLD: 12.1 %
SODIUM SERPL-SCNC: 131 MMOL/L
TRIGL SERPL-MCNC: 106 MG/DL
TROPONIN I SERPL-MCNC: <0.01 NG/ML
TSH SERPL-ACNC: 1.69 UIU/ML
WBC # FLD AUTO: 4.78 K/UL

## 2021-10-06 ENCOUNTER — APPOINTMENT (OUTPATIENT)
Dept: CARDIOLOGY | Facility: CLINIC | Age: 76
End: 2021-10-06
Payer: MEDICARE

## 2021-10-06 VITALS
DIASTOLIC BLOOD PRESSURE: 80 MMHG | OXYGEN SATURATION: 100 % | WEIGHT: 168 LBS | HEART RATE: 51 BPM | BODY MASS INDEX: 25.54 KG/M2 | SYSTOLIC BLOOD PRESSURE: 120 MMHG

## 2021-10-06 PROCEDURE — 99215 OFFICE O/P EST HI 40 MIN: CPT | Mod: 25

## 2021-10-06 PROCEDURE — 36415 COLL VENOUS BLD VENIPUNCTURE: CPT

## 2021-10-06 PROCEDURE — 93325 DOPPLER ECHO COLOR FLOW MAPG: CPT

## 2021-10-06 PROCEDURE — 93320 DOPPLER ECHO COMPLETE: CPT

## 2021-10-06 PROCEDURE — 93351 STRESS TTE COMPLETE: CPT

## 2021-10-07 LAB
ALBUMIN SERPL ELPH-MCNC: 4.5 G/DL
ALP BLD-CCNC: 103 U/L
ALT SERPL-CCNC: 14 U/L
ANION GAP SERPL CALC-SCNC: 16 MMOL/L
AST SERPL-CCNC: 18 U/L
BASOPHILS # BLD AUTO: 0.06 K/UL
BASOPHILS NFR BLD AUTO: 1 %
BILIRUB SERPL-MCNC: 0.5 MG/DL
BUN SERPL-MCNC: 16 MG/DL
CALCIUM SERPL-MCNC: 9.3 MG/DL
CHLORIDE SERPL-SCNC: 96 MMOL/L
CO2 SERPL-SCNC: 21 MMOL/L
CREAT SERPL-MCNC: 0.93 MG/DL
EOSINOPHIL # BLD AUTO: 0.39 K/UL
EOSINOPHIL NFR BLD AUTO: 6.7 %
GLUCOSE SERPL-MCNC: 90 MG/DL
HCT VFR BLD CALC: 41.7 %
HGB BLD-MCNC: 13.5 G/DL
IMM GRANULOCYTES NFR BLD AUTO: 0.3 %
LYMPHOCYTES # BLD AUTO: 1.12 K/UL
LYMPHOCYTES NFR BLD AUTO: 19.3 %
MAN DIFF?: NORMAL
MCHC RBC-ENTMCNC: 28.4 PG
MCHC RBC-ENTMCNC: 32.4 GM/DL
MCV RBC AUTO: 87.6 FL
MONOCYTES # BLD AUTO: 0.54 K/UL
MONOCYTES NFR BLD AUTO: 9.3 %
NEUTROPHILS # BLD AUTO: 3.66 K/UL
NEUTROPHILS NFR BLD AUTO: 63.4 %
PLATELET # BLD AUTO: 295 K/UL
POTASSIUM SERPL-SCNC: 4.8 MMOL/L
PROT SERPL-MCNC: 7.1 G/DL
RBC # BLD: 4.76 M/UL
RBC # FLD: 12.8 %
SODIUM SERPL-SCNC: 133 MMOL/L
WBC # FLD AUTO: 5.79 K/UL

## 2021-10-09 ENCOUNTER — APPOINTMENT (OUTPATIENT)
Dept: DISASTER EMERGENCY | Facility: CLINIC | Age: 76
End: 2021-10-09

## 2021-10-09 DIAGNOSIS — Z01.818 ENCOUNTER FOR OTHER PREPROCEDURAL EXAMINATION: ICD-10-CM

## 2021-10-10 LAB — SARS-COV-2 N GENE NPH QL NAA+PROBE: NOT DETECTED

## 2021-10-12 ENCOUNTER — OUTPATIENT (OUTPATIENT)
Dept: OUTPATIENT SERVICES | Facility: HOSPITAL | Age: 76
LOS: 1 days | End: 2021-10-12
Payer: MEDICARE

## 2021-10-12 ENCOUNTER — TRANSCRIPTION ENCOUNTER (OUTPATIENT)
Age: 76
End: 2021-10-12

## 2021-10-12 VITALS
HEART RATE: 43 BPM | WEIGHT: 162.04 LBS | TEMPERATURE: 98 F | RESPIRATION RATE: 16 BRPM | OXYGEN SATURATION: 100 % | SYSTOLIC BLOOD PRESSURE: 156 MMHG | HEIGHT: 69 IN | DIASTOLIC BLOOD PRESSURE: 73 MMHG

## 2021-10-12 VITALS
SYSTOLIC BLOOD PRESSURE: 112 MMHG | RESPIRATION RATE: 16 BRPM | DIASTOLIC BLOOD PRESSURE: 56 MMHG | HEART RATE: 55 BPM | OXYGEN SATURATION: 95 %

## 2021-10-12 DIAGNOSIS — Z98.890 OTHER SPECIFIED POSTPROCEDURAL STATES: Chronic | ICD-10-CM

## 2021-10-12 DIAGNOSIS — Z87.09 PERSONAL HISTORY OF OTHER DISEASES OF THE RESPIRATORY SYSTEM: Chronic | ICD-10-CM

## 2021-10-12 DIAGNOSIS — I25.10 ATHEROSCLEROTIC HEART DISEASE OF NATIVE CORONARY ARTERY WITHOUT ANGINA PECTORIS: ICD-10-CM

## 2021-10-12 LAB
ANION GAP SERPL CALC-SCNC: 13 MMOL/L — SIGNIFICANT CHANGE UP (ref 5–17)
BUN SERPL-MCNC: 15 MG/DL — SIGNIFICANT CHANGE UP (ref 7–23)
CALCIUM SERPL-MCNC: 9.1 MG/DL — SIGNIFICANT CHANGE UP (ref 8.4–10.5)
CHLORIDE SERPL-SCNC: 97 MMOL/L — SIGNIFICANT CHANGE UP (ref 96–108)
CO2 SERPL-SCNC: 22 MMOL/L — SIGNIFICANT CHANGE UP (ref 22–31)
CREAT SERPL-MCNC: 0.97 MG/DL — SIGNIFICANT CHANGE UP (ref 0.5–1.3)
GLUCOSE SERPL-MCNC: 107 MG/DL — HIGH (ref 70–99)
HCT VFR BLD CALC: 40.1 % — SIGNIFICANT CHANGE UP (ref 39–50)
HGB BLD-MCNC: 13.3 G/DL — SIGNIFICANT CHANGE UP (ref 13–17)
MCHC RBC-ENTMCNC: 28.3 PG — SIGNIFICANT CHANGE UP (ref 27–34)
MCHC RBC-ENTMCNC: 33.2 GM/DL — SIGNIFICANT CHANGE UP (ref 32–36)
MCV RBC AUTO: 85.3 FL — SIGNIFICANT CHANGE UP (ref 80–100)
NRBC # BLD: 0 /100 WBCS — SIGNIFICANT CHANGE UP (ref 0–0)
PLATELET # BLD AUTO: 287 K/UL — SIGNIFICANT CHANGE UP (ref 150–400)
POTASSIUM SERPL-MCNC: 4.2 MMOL/L — SIGNIFICANT CHANGE UP (ref 3.5–5.3)
POTASSIUM SERPL-SCNC: 4.2 MMOL/L — SIGNIFICANT CHANGE UP (ref 3.5–5.3)
RBC # BLD: 4.7 M/UL — SIGNIFICANT CHANGE UP (ref 4.2–5.8)
RBC # FLD: 12.5 % — SIGNIFICANT CHANGE UP (ref 10.3–14.5)
SODIUM SERPL-SCNC: 132 MMOL/L — LOW (ref 135–145)
WBC # BLD: 6.13 K/UL — SIGNIFICANT CHANGE UP (ref 3.8–10.5)
WBC # FLD AUTO: 6.13 K/UL — SIGNIFICANT CHANGE UP (ref 3.8–10.5)

## 2021-10-12 PROCEDURE — 93571 IV DOP VEL&/PRESS C FLO 1ST: CPT | Mod: LD

## 2021-10-12 PROCEDURE — 93454 CORONARY ARTERY ANGIO S&I: CPT | Mod: 26,59

## 2021-10-12 PROCEDURE — 92928 PRQ TCAT PLMT NTRAC ST 1 LES: CPT | Mod: LD

## 2021-10-12 PROCEDURE — 92978 ENDOLUMINL IVUS OCT C 1ST: CPT | Mod: LD

## 2021-10-12 PROCEDURE — C9600: CPT | Mod: LD

## 2021-10-12 PROCEDURE — C1894: CPT

## 2021-10-12 PROCEDURE — 93005 ELECTROCARDIOGRAM TRACING: CPT

## 2021-10-12 PROCEDURE — 93454 CORONARY ARTERY ANGIO S&I: CPT | Mod: 59

## 2021-10-12 PROCEDURE — C1725: CPT

## 2021-10-12 PROCEDURE — 93010 ELECTROCARDIOGRAM REPORT: CPT | Mod: 77

## 2021-10-12 PROCEDURE — C1874: CPT

## 2021-10-12 PROCEDURE — C1769: CPT

## 2021-10-12 PROCEDURE — C1753: CPT

## 2021-10-12 PROCEDURE — 93571 IV DOP VEL&/PRESS C FLO 1ST: CPT | Mod: 26,LD

## 2021-10-12 PROCEDURE — 80048 BASIC METABOLIC PNL TOTAL CA: CPT

## 2021-10-12 PROCEDURE — C1887: CPT

## 2021-10-12 PROCEDURE — 99152 MOD SED SAME PHYS/QHP 5/>YRS: CPT

## 2021-10-12 PROCEDURE — 93010 ELECTROCARDIOGRAM REPORT: CPT

## 2021-10-12 PROCEDURE — 92978 ENDOLUMINL IVUS OCT C 1ST: CPT | Mod: 26,LD

## 2021-10-12 PROCEDURE — 85027 COMPLETE CBC AUTOMATED: CPT

## 2021-10-12 PROCEDURE — 99153 MOD SED SAME PHYS/QHP EA: CPT

## 2021-10-12 RX ORDER — ATORVASTATIN CALCIUM 80 MG/1
1 TABLET, FILM COATED ORAL
Qty: 0 | Refills: 0 | DISCHARGE

## 2021-10-12 RX ORDER — SODIUM CHLORIDE 9 MG/ML
3 INJECTION INTRAMUSCULAR; INTRAVENOUS; SUBCUTANEOUS EVERY 8 HOURS
Refills: 0 | Status: DISCONTINUED | OUTPATIENT
Start: 2021-10-12 | End: 2021-10-26

## 2021-10-12 RX ORDER — CARBIDOPA AND LEVODOPA 25; 100 MG/1; MG/1
1 TABLET ORAL
Qty: 0 | Refills: 0 | DISCHARGE

## 2021-10-12 RX ORDER — CLOPIDOGREL BISULFATE 75 MG/1
1 TABLET, FILM COATED ORAL
Qty: 90 | Refills: 3
Start: 2021-10-12 | End: 2022-10-06

## 2021-10-12 RX ORDER — FERROUS SULFATE 325(65) MG
1 TABLET ORAL
Qty: 0 | Refills: 0 | DISCHARGE

## 2021-10-12 RX ORDER — RANOLAZINE 500 MG/1
1 TABLET, FILM COATED, EXTENDED RELEASE ORAL
Qty: 0 | Refills: 0 | DISCHARGE

## 2021-10-12 RX ORDER — CARBIDOPA AND LEVODOPA 25; 100 MG/1; MG/1
1 TABLET ORAL THREE TIMES A DAY
Refills: 0 | Status: DISCONTINUED | OUTPATIENT
Start: 2021-10-12 | End: 2021-10-26

## 2021-10-12 RX ORDER — RANOLAZINE 500 MG/1
500 TABLET, FILM COATED, EXTENDED RELEASE ORAL
Refills: 0 | Status: DISCONTINUED | OUTPATIENT
Start: 2021-10-12 | End: 2021-10-26

## 2021-10-12 RX ORDER — FERROUS SULFATE 325(65) MG
325 TABLET ORAL DAILY
Refills: 0 | Status: DISCONTINUED | OUTPATIENT
Start: 2021-10-12 | End: 2021-10-26

## 2021-10-12 RX ORDER — RASAGILINE 0.5 MG/1
1 TABLET ORAL
Qty: 0 | Refills: 0 | DISCHARGE

## 2021-10-12 RX ORDER — ASPIRIN/CALCIUM CARB/MAGNESIUM 324 MG
1 TABLET ORAL
Qty: 90 | Refills: 3
Start: 2021-10-12 | End: 2022-10-06

## 2021-10-12 RX ORDER — RASAGILINE 0.5 MG/1
1 TABLET ORAL DAILY
Refills: 0 | Status: DISCONTINUED | OUTPATIENT
Start: 2021-10-12 | End: 2021-10-26

## 2021-10-12 RX ORDER — ATORVASTATIN CALCIUM 80 MG/1
40 TABLET, FILM COATED ORAL AT BEDTIME
Refills: 0 | Status: DISCONTINUED | OUTPATIENT
Start: 2021-10-12 | End: 2021-10-26

## 2021-10-12 NOTE — H&P CARDIOLOGY - GASTROINTESTINAL DETAILS
Scope sites healing with mild tenderness at umbilicus/soft/no masses palpable/bowel sounds normal/no bruit/no rebound tenderness/no guarding/no rigidity/no organomegaly
soft/bowel sounds normal

## 2021-10-12 NOTE — ASU DISCHARGE PLAN (ADULT/PEDIATRIC) - CARE PROVIDER_API CALL
Ari Celis (MD)  Cardiovascular Disease; Internal Medicine  [address], [facility] [address]  [place] [address]  Phone: [phone]  Fax: [phone]  Established Patient  Follow Up Time: 2 weeks

## 2021-10-12 NOTE — H&P CARDIOLOGY - NSICDXPASTSURGICALHX_GEN_ALL_CORE_FT
PAST SURGICAL HISTORY:  H/O inguinal hernia repair 6 weeks ago early September 2021    H/O sinusitis     History of left heart catheterization ~2015 [facility] with dLAD 40% disease at [facility]    History of repair of hiatal hernia 6 weeks ago early September 2021 - right    S/P shoulder surgery     
PAST SURGICAL HISTORY:  H/O inguinal hernia repair 6 weeks ago early September 2021    H/O sinusitis     History of left heart catheterization ~2015 [facility] with dLAD 40% disease at [facility]    History of repair of hiatal hernia 6 weeks ago early September 2021 - right    S/P shoulder surgery

## 2021-10-12 NOTE — H&P CARDIOLOGY - HISTORY OF PRESENT ILLNESS
76 y/o with pmh of non-obstructive CAD (40% dLAD) with chronic angina on Ranexa, Parkinsons, SND with Sinus bradycardia (asymptomatic) and iron deficiency anemia followed by Dr. Ari Celis, Cardiologist with increased use of his Ranexa from 500mg BID to 1000mg BID for 1 day because of increased angina.  He is s/p stress test with EKG abnormalities and his usual chest pain at a low workload.  TTE on 10/6 with normal LV systolic function LVEF 65% and his resting HR in the 40s with no near syncope or any syncope.  He did exhibit chronotropic competence with exercise stress to 111.  He presents for [facility] for further evaluation of his CAD.   76 y/o with pmh of HLD,  non-obstructive CAD (40% dLAD) s/p LHC ~ 6 years ago at [facility], chronic angina on Ranexa, Parkinson's (symptoms controlled on Sinemet managed by Dr. Arana at [facility]), SND with Sinus bradycardia (asymptomatic), Hiatal Hernia and Right inguinal hernia repair (about 6 weeks ago with tenderness in right groin remaining) and iron deficiency anemia followed by Dr. Ari Celis, Cardiologist with increased use of his Ranexa from 500mg BID to 1000mg BID for 1 day because of increased angina. He states walking up a hill he experiences chest burning that resolves at rest.   He is s/p stress test with EKG abnormalities and his usual chest pain at a low workload (as per Dr Celis's note no attached ST results).  TTE on 10/6 with normal LV systolic function LVEF 65% (From HIE) and his resting HR in the 40s with no near syncope or any syncope.  He did exhibit chronotropic competence with exercise stress to 111.  He presents for [facility] for further evaluation of his CAD and denies any anginal symptoms presently.  He denies cp, sob, palpitations, fevers, chills, sick contacts or implantable devices.  His COVID PCR negative on 10/9/21.

## 2021-10-12 NOTE — H&P CARDIOLOGY - NS MD HP PULSE DORSALIS
Balloon inserted to left anterior descending and ostium left anterior descending. right normal/left normal

## 2021-10-12 NOTE — H&P CARDIOLOGY - ADDITIONAL PE
Mallampati - 2    Right groin with tenderness soft from recent inguinal hernia repair about 6 weeks ago

## 2021-10-12 NOTE — H&P CARDIOLOGY - HISTORY OF PRESENT ILLNESS
66 yo F with PMhx HTN, HLD, obesity presents for [facility] today after experiencing mild to moderate [place] with moderate exertion. She follows with Dr Amin. She had a nuclear stress test on 10/6 that was concerning for inferior wall myocardial ischemia. Normal wall motion with a calculated left ventricular  EF 61%.     No fever or chills, No N/V/D or abdominal pain. No CP, palpitations or syncope.

## 2021-10-12 NOTE — DISCHARGE NOTE NURSING/CASE MANAGEMENT/SOCIAL WORK - PATIENT PORTAL LINK FT
You can access the FollowMyHealth Patient Portal offered by [facility] by registering at the following website: [email or web address]. By joining [employer]’s FollowMyHealth portal, you will also be able to view your health information using other applications (apps) compatible with our system.

## 2021-10-12 NOTE — H&P CARDIOLOGY - NSICDXPASTMEDICALHX_GEN_ALL_CORE_FT
PAST MEDICAL HISTORY:  Bradycardia     CAD (coronary artery disease) Non-obstructive dLAD 40%    Chronic stable angina     High cholesterol     Parkinsons disease     
PAST MEDICAL HISTORY:  Bradycardia     CAD (coronary artery disease) Non-obstructive dLAD 40%    Chronic stable angina     High cholesterol     Parkinsons disease

## 2021-10-12 NOTE — H&P CARDIOLOGY - RS GEN PE MLT RESP DETAILS PC
breath sounds equal/clear to auscultation bilaterally/no rales/no rhonchi/no wheezes
mild wheeze right anterior chest/airway patent/breath sounds equal/good air movement/respirations non-labored/clear to auscultation bilaterally/no chest wall tenderness/no intercostal retractions/no rales/no rhonchi/no subcutaneous emphysema

## 2021-10-12 NOTE — H&P CARDIOLOGY - NSICDXFAMILYHX_GEN_ALL_CORE_FT
FAMILY HISTORY:  Father  Still living? No  FH: CAD (coronary artery disease), Age at diagnosis: Age Unknown    
FAMILY HISTORY:  Father  Still living? No  FH: CAD (coronary artery disease), Age at diagnosis: Age Unknown

## 2021-10-12 NOTE — ASU DISCHARGE PLAN (ADULT/PEDIATRIC) - ASU DC SPECIAL INSTRUCTIONSFT
Wound Care:   the day AFTER your procedure remove bandage GENTLY, and clean using  mild soap and gentle warm, water stream, pat dry. leave OPEN to air. YOU MAY SHOWER   DO NOT apply lotions, creams, ointments, powder, perfumes to your incision site  DO NOT SOAK your site for 1 week (no baths, no pools, no tubs, etc...)  Check  your groin and /or wrist daily. A small amount of bruising, and soreness are normal    ACTIVITY: for 24 hours   - DO NOT DRIVE  - DO NOT make any important decisions or sign legal documents   - DO NOT operate heavy machineries  - you may resume sexual activity in 48 hours, unless otherwise instructed by your cardiologist     If your procedure was done through the WRIST: for the NEXT 3 DAYS:  - avoid pushing, pulling, with that affected wrist   - avoid repeated movement of that hand and wrist (eg: typing, hammering)  - DO NOT LIFT anything more than 5 lbs     If your procedure was done through the GROIN: for the NEXT 5 DAYS  - Limit climbing stairs, DO NOT soak in bathtub or pool  - no strenuous activities, pushing, pulling, straining  - Do not lift anything 10lbs or heavier     MEDICATION:   take your medications as explained (see discharge paperwork)   If you received a STENT, you will be taking antiplatelet medications to KEEP YOUR STENT OPEN ( eg: Aspirin, Plavix, Brilinta, Effient, etc).  Take as prescribed DO NOT STOP taking them without consulting with your cardiologist first.     Follow heart healthy diet recommended by your doctor, , if you smoke STOP SMOKING ( may call [phone] for center of tobacco control if you need assistance)     CALL your doctor to make appointment in 2 WEEKS     ***CALL YOUR DOCTOR***  if you experience: fever, chills, body aches, or severe pain, swelling, redness, heat or yellow discharge at incision site  If you experience Bleeding or excruciating pain at the procedural site, swelling ( golf ball size) at your procedural site  If you experience CHEST PAIN  If you experience extremity numbness, tingling, temperature change ( of your procedural site)   If you are unable to reach your doctor, you may contact:   -Cardiology Office at [facility] at [phone] or   - [facility] [phone]  - [facility] [phone]

## 2021-10-13 ENCOUNTER — NON-APPOINTMENT (OUTPATIENT)
Age: 76
End: 2021-10-13

## 2021-10-13 NOTE — PHYSICAL EXAM
[General Appearance - Well Developed] : well developed [Normal Appearance] : normal appearance [Well Groomed] : well groomed [General Appearance - Well Nourished] : well nourished [No Deformities] : no deformities [General Appearance - In No Acute Distress] : no acute distress [Normal Conjunctiva] : the conjunctiva exhibited no abnormalities [Eyelids - No Xanthelasma] : the eyelids demonstrated no xanthelasmas [Normal Oral Mucosa] : normal oral mucosa [No Oral Pallor] : no oral pallor [No Oral Cyanosis] : no oral cyanosis [Normal Jugular Venous A Waves Present] : normal jugular venous A waves present [Normal Jugular Venous V Waves Present] : normal jugular venous V waves present [No Jugular Venous Wasserman A Waves] : no jugular venous wasserman A waves [Respiration, Rhythm And Depth] : normal respiratory rhythm and effort [Exaggerated Use Of Accessory Muscles For Inspiration] : no accessory muscle use [Auscultation Breath Sounds / Voice Sounds] : lungs were clear to auscultation bilaterally [Heart Rate And Rhythm] : heart rate and rhythm were normal [Heart Sounds] : normal S1 and S2 [Murmurs] : no murmurs present [Bradycardic ___] : the heart rate was bradycardic at [unfilled] bpm [Regular-Premature Beats] : the rhythm was regular with premature beats [Abdomen Soft] : soft [Abdomen Tenderness] : non-tender [Abdomen Mass (___ Cm)] : no abdominal mass palpated [Abnormal Walk] : normal gait [Gait - Sufficient For Exercise Testing] : the gait was sufficient for exercise testing [Nail Clubbing] : no clubbing of the fingernails [Cyanosis, Localized] : no localized cyanosis [Petechial Hemorrhages (___cm)] : no petechial hemorrhages [Skin Color & Pigmentation] : normal skin color and pigmentation [] : no rash [No Venous Stasis] : no venous stasis [Skin Lesions] : no skin lesions [No Skin Ulcers] : no skin ulcer [No Xanthoma] : no  xanthoma was observed [Oriented To Time, Place, And Person] : oriented to person, place, and time [Affect] : the affect was normal [Mood] : the mood was normal [FreeTextEntry1] : Seems less depressed, and maybe less anxious.

## 2021-10-13 NOTE — REVIEW OF SYSTEMS
[Chest Discomfort] : chest discomfort [Depression] : depression [Negative] : Heme/Lymph [Feeling Fatigued] : not feeling fatigued [Weight Loss (___ Lbs)] : no recent weight loss [Dyspnea on exertion] : not dyspnea during exertion [Lower Ext Edema] : no extremity edema [Palpitations] : no palpitations [Syncope] : no syncope [Abdominal Pain] : no abdominal pain [Suicidal] : not suicidal [FreeTextEntry5] : see HPI [de-identified] : Parkinsonism and some orthostasis [de-identified] : Seems a little depressed

## 2021-10-13 NOTE — HISTORY OF PRESENT ILLNESS
[FreeTextEntry1] : In 2012 the patient presented with exertional chest burning. Abnormal stress echocardiogram. Cardiac catheterization revealed nonobstructive coronary disease (only 40% distal LAD). He been maintained on Ranexa with good relief of his symptoms. \par December 6, 2016. The last couple weeks the patient has been getting his chest tightness, primarily at rest when he stressed. He's had to use nitroglycerin at least once a week. He's had no chest discomfort when he walks on the treadmill or exercise in the gym. Patient also feels slightly lightheaded once in a great while. He usually is sitting when it occurs. He does not have it when he is walking or standing.\par Stress echo on December 16 was negative and the echo again showed mild to moderate MR, unchanged.\par December 4, 2017. One year since last visit. Symptoms, maybe once a week, and usually related to emotional upset and stress. Responds to nitroglycerin within 5 minutes. He never increased his Ranexa. He was complaining about the cost of Ranexa and we discussed the rationale for Ranexa versus other medications in his case. Beta blockers must be avoided given his sinus bradycardia. He had labs with Dr. Rossi on September 28. He remains with iron deficiency anemia, but he had a negative GI workup in 2014. Since then he has increased his iron from once a day to twice a day. His lipids had a cholesterol of 147, HDL 70, LDL 63, triglycerides 68, and he remains on atorvastatin. His hemoglobin A1c was 5.5, and the rest of his labs were unremarkable. He had a flu shot with Dr. Rossi.\par April 24, 2018. Patient has recently been noticing that he is getting more short of breath with exertion with some chest pressure. Can barely walk a block or 2. 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 The orthopedist wants to give him a synthetic cartilage injection and he is seeing a vascular doctor later this week because of the swelling in his legs, which is a little more on the right side, where his knee is acting up. He said he walks and feels like an old man. EKG is sinus bradycardia and unchanged. Found to have severe iron deficiency anemia and referred to GI.\par June 7, 2018. The patient returns in followup. Had colonoscopy and endoscopy that were mostly negative. Still considering capsule study. In the meantime, he is on iron twice a day, and his levels have come up. He had been transfused 3 units at the beginning. He still has some orthostatic dizziness at times, but otherwise most of his symptoms are gone. He is getting ready for a trip to [facility]. His EKG remains with sinus bradycardia, and some nonspecific ST changes, but unchanged.\par October 9, 2018. Patient was at hematology on October 5, and a heart rate of 42, was recorded. I was told he had a low heart rate and needed an appointment and he was scheduled for today. Now the patient says when he called he told them he was dizzy and having symptoms, which was not related to me. Here, he is in sinus bradycardia at 46, with mild ST depressions V4 through 6 as well as lead II and aVF. His dizziness is mostly when he changes positions that'll last for a few seconds, and then passes. He is not limited in his exercise capacity and does not quite get near syncopal. No other complaints. Able to walk 20 minutes the other day without stopping.\par November 21, 2019.  First visit in over a year.  Slightly depressed over diagnosis of Parkinson's but so far mild case and doing well.  On Azilect 0.5 twice daily and resagiline 1 mg daily.  Heart rate still slow at 41 but no syncope or near syncope.  Occasionally feels a little dizzy sitting or walking but does not sound like near syncope.  Changed his atorvastatin to every other day on his own but has not had lipids since October of last year with me.  He will have Dr. Rossi added to the blood test when he sees him in a few weeks.  Hemoglobin and hematocrit and iron levels have been holding steady.  No chest pain or shortness of breath.  Considering some experimental options for the Parkinson's going forward.\par December 9, 2019.  Patient called and wanted to come in.  Saw Dr. Sohail Arana of neurology who told him he thinks he needs a pacemaker and that his low heart rate could be explaining some of his Parkinson's symptoms.  I put in a call to Dr. Arana and I am awaiting a response.  In the meantime reviewed the symptoms and the procedure with the patient.  At times he says he does have some lightheadedness and dizziness and wants the pacemaker again at other times is too nervous does not want to stay in hospital overnight and does not think he is having any symptoms different than the last few years.  Certainly no syncope and no definite near syncope.  Heart rate here was 48.  Blood pressure okay.  No recent change in medications.\par July 7, 2020.  Patient here in follow-up.  Remains in sinus bradycardia at 47 with an otherwise normal ECG except incomplete right bundle branch block.  Recent labs with cholesterol 209, HDL 68, .  Normal thyroid function.  Last week he felt dizzy for most of the week and most of the days on and off  somewhat lightheaded but not near syncopal, not vertigo.  This week however he feels perfectly back to himself so not that likely to be from conduction disease.  No other complaints.  He remains on Lipitor and Ranexa.\par August 9, 2021.  Patient here for preop cardiac evaluation due to an abnormal ECG.  Last here over a year ago.  Has been doing well for the most part but has developed mild Parkinson's.  Not having any issues with angina currently but upon pushing the history he does have some orthostatic dizziness now and then.  Even to the point now where he knows to stop and wait a few minutes when he gets out of the car etc. (Actually I had mentioned this in my note over a year ago and that his symptoms were probably orthostasis related to his Parkinson's.)  Here in fact there is a drop in his blood pressure from lying to sitting and is probably related to his parkinsonism.  Otherwise no symptoms or signs of heart failure unstable angina etc.  He always has a mildly abnormal ECG with some ST depressions in the inferior leads and V4 through 6 and the EKG done preop at [facility] was not significantly changed from his previous EKGs of over a year ago.  He will be having surgery on both an inguinal and an abdominal hernia by Dr. Bo.\par September 10, 2021.  Patient here for urgent visit.  Called yesterday with a burning chest pain and I was unavailable and was told to speak with or see his internist Dr. Rossi.  He saw Dr. Rossi this morning and EKG was totally unchanged with his usual sinus bradycardia and minimal ST depressions.  He is now here. His surgery and the recovery was a little more difficult. And now for the last week or so he has been having his usual exertional chest burning relieved with rest. It does not seem to be progressive. No signs that he is anemic again but he is more fatigued since the surgery. He also talked about his mild orthostasis which again is more likely related to the Parkinson's. After long discussion we decided that if his troponin is positive but he is stable we will schedule him for an angiogram next week, and if troponin is negative we will try to double up on the Ranexa and consider a stress test or a CT angio of his coronaries.\par September 13, 2021. Spoke with patient.  Troponin negative.  Excellent lipid profile, normal hemoglobin A1c, normal BNP, sodium 131, hematocrit 37.  Patient actually feeling much better and would like to wait until the stress test scheduled at the beginning of October.  Only complaint is vague lightheadedness at times. \par October 6, 2021.  Patient returned for stress echo and follow-up.  He has been taking the Ranexa 1000 twice daily 1 day and 500 twice daily the next alternating for some reason of his ulna.  Has not really noticed any change in his symptoms.  On the stress test he got his symptoms at 4-1/2 minutes of a low level protocol with his heart rate 80 and there were ST depressions mostly in 2 and F but also in V3 through V6 that persisted in recovery.  He was able to do 10 minutes of the low level protocol to a heart rate of 111.  He got his usual chest burning that increased with exercise and took about 8 or 9 minutes of recovery to go away.  The echocardiogram post exercise was actually normal without wall motion abnormalities.  The resting echo itself had normal LV and RV function with mild to moderate MR and mild TR and was really unchanged from 2016.  After long discussion with patient and his wife we elected to schedule coronary angiography.\par October 13, 2021.Patient had cath yesterday.  70% stenosis in the mid LAD confirmed as significant with a positive IFR.  Stent was placed.  He should remain on dual antiplatelet therapy for 3 months.  I spoke with Dr. Man yesterday and I left a message for the patient this evening.  He should make follow-up appointment with me

## 2021-10-14 PROBLEM — I20.8 OTHER FORMS OF ANGINA PECTORIS: Chronic | Status: ACTIVE | Noted: 2021-10-12

## 2021-10-14 PROBLEM — I25.10 ATHEROSCLEROTIC HEART DISEASE OF NATIVE CORONARY ARTERY WITHOUT ANGINA PECTORIS: Chronic | Status: ACTIVE | Noted: 2021-10-12

## 2021-10-14 PROBLEM — G20 PARKINSON'S DISEASE: Chronic | Status: ACTIVE | Noted: 2021-10-12

## 2021-10-29 ENCOUNTER — NON-APPOINTMENT (OUTPATIENT)
Age: 76
End: 2021-10-29

## 2021-10-29 ENCOUNTER — APPOINTMENT (OUTPATIENT)
Dept: CARDIOLOGY | Facility: CLINIC | Age: 76
End: 2021-10-29
Payer: MEDICARE

## 2021-10-29 VITALS
BODY MASS INDEX: 25.54 KG/M2 | SYSTOLIC BLOOD PRESSURE: 145 MMHG | DIASTOLIC BLOOD PRESSURE: 73 MMHG | WEIGHT: 168 LBS | HEART RATE: 53 BPM | OXYGEN SATURATION: 99 %

## 2021-10-29 VITALS — DIASTOLIC BLOOD PRESSURE: 78 MMHG | SYSTOLIC BLOOD PRESSURE: 145 MMHG | HEART RATE: 46 BPM

## 2021-10-29 PROCEDURE — 93000 ELECTROCARDIOGRAM COMPLETE: CPT

## 2021-10-29 PROCEDURE — 36415 COLL VENOUS BLD VENIPUNCTURE: CPT

## 2021-10-29 PROCEDURE — 99214 OFFICE O/P EST MOD 30 MIN: CPT

## 2021-10-29 NOTE — REASON FOR VISIT
[FreeTextEntry1] : The patient is a 75-year-old male with a history of angina with nonobstructive coronary artery disease,  and sinus node dysfunction and iron deficiency anemia here because of chest pain. Now here s/p stent to LAD.

## 2021-10-29 NOTE — PHYSICAL EXAM
[General Appearance - Well Developed] : well developed [Normal Appearance] : normal appearance [Well Groomed] : well groomed [General Appearance - Well Nourished] : well nourished [No Deformities] : no deformities [General Appearance - In No Acute Distress] : no acute distress [Normal Conjunctiva] : the conjunctiva exhibited no abnormalities [Eyelids - No Xanthelasma] : the eyelids demonstrated no xanthelasmas [Normal Oral Mucosa] : normal oral mucosa [No Oral Pallor] : no oral pallor [No Oral Cyanosis] : no oral cyanosis [Normal Jugular Venous A Waves Present] : normal jugular venous A waves present [Normal Jugular Venous V Waves Present] : normal jugular venous V waves present [No Jugular Venous Wasserman A Waves] : no jugular venous wasserman A waves [Respiration, Rhythm And Depth] : normal respiratory rhythm and effort [Exaggerated Use Of Accessory Muscles For Inspiration] : no accessory muscle use [Auscultation Breath Sounds / Voice Sounds] : lungs were clear to auscultation bilaterally [Heart Rate And Rhythm] : heart rate and rhythm were normal [Heart Sounds] : normal S1 and S2 [Murmurs] : no murmurs present [Bradycardic ___] : the heart rate was bradycardic at [unfilled] bpm [Regular-Premature Beats] : the rhythm was regular with premature beats [Abdomen Soft] : soft [Abdomen Tenderness] : non-tender [Abdomen Mass (___ Cm)] : no abdominal mass palpated [Abnormal Walk] : normal gait [Gait - Sufficient For Exercise Testing] : the gait was sufficient for exercise testing [Nail Clubbing] : no clubbing of the fingernails [Cyanosis, Localized] : no localized cyanosis [Petechial Hemorrhages (___cm)] : no petechial hemorrhages [Skin Color & Pigmentation] : normal skin color and pigmentation [] : no rash [No Venous Stasis] : no venous stasis [Skin Lesions] : no skin lesions [No Skin Ulcers] : no skin ulcer [No Xanthoma] : no  xanthoma was observed [Oriented To Time, Place, And Person] : oriented to person, place, and time [Affect] : the affect was normal [Mood] : the mood was normal [FreeTextEntry1] : Seems less depressed, but more anxious.

## 2021-10-29 NOTE — HISTORY OF PRESENT ILLNESS
[FreeTextEntry1] : In 2012 the patient presented with exertional chest burning. Abnormal stress echocardiogram. Cardiac catheterization revealed nonobstructive coronary disease (only 40% distal LAD). He been maintained on Ranexa with good relief of his symptoms. \par December 6, 2016. The last couple weeks the patient has been getting his chest tightness, primarily at rest when he stressed. He's had to use nitroglycerin at least once a week. He's had no chest discomfort when he walks on the treadmill or exercise in the gym. Patient also feels slightly lightheaded once in a great while. He usually is sitting when it occurs. He does not have it when he is walking or standing.\par Stress echo on December 16 was negative and the echo again showed mild to moderate MR, unchanged.\par December 4, 2017. One year since last visit. Symptoms, maybe once a week, and usually related to emotional upset and stress. Responds to nitroglycerin within 5 minutes. He never increased his Ranexa. He was complaining about the cost of Ranexa and we discussed the rationale for Ranexa versus other medications in his case. Beta blockers must be avoided given his sinus bradycardia. He had labs with Dr. Rossi on September 28. He remains with iron deficiency anemia, but he had a negative GI workup in 2014. Since then he has increased his iron from once a day to twice a day. His lipids had a cholesterol of 147, HDL 70, LDL 63, triglycerides 68, and he remains on atorvastatin. His hemoglobin A1c was 5.5, and the rest of his labs were unremarkable. He had a flu shot with Dr. Rossi.\par April 24, 2018. Patient has recently been noticing that he is getting more short of breath with exertion with some chest pressure. Can barely walk a block or 2. 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 The orthopedist wants to give him a synthetic cartilage injection and he is seeing a vascular doctor later this week because of the swelling in his legs, which is a little more on the right side, where his knee is acting up. He said he walks and feels like an old man. EKG is sinus bradycardia and unchanged. Found to have severe iron deficiency anemia and referred to GI.\par June 7, 2018. The patient returns in followup. Had colonoscopy and endoscopy that were mostly negative. Still considering capsule study. In the meantime, he is on iron twice a day, and his levels have come up. He had been transfused 3 units at the beginning. He still has some orthostatic dizziness at times, but otherwise most of his symptoms are gone. He is getting ready for a trip to [facility]. His EKG remains with sinus bradycardia, and some nonspecific ST changes, but unchanged.\par October 9, 2018. Patient was at hematology on October 5, and a heart rate of 42, was recorded. I was told he had a low heart rate and needed an appointment and he was scheduled for today. Now the patient says when he called he told them he was dizzy and having symptoms, which was not related to me. Here, he is in sinus bradycardia at 46, with mild ST depressions V4 through 6 as well as lead II and aVF. His dizziness is mostly when he changes positions that'll last for a few seconds, and then passes. He is not limited in his exercise capacity and does not quite get near syncopal. No other complaints. Able to walk 20 minutes the other day without stopping.\par November 21, 2019.  First visit in over a year.  Slightly depressed over diagnosis of Parkinson's but so far mild case and doing well.  On Azilect 0.5 twice daily and resagiline 1 mg daily.  Heart rate still slow at 41 but no syncope or near syncope.  Occasionally feels a little dizzy sitting or walking but does not sound like near syncope.  Changed his atorvastatin to every other day on his own but has not had lipids since October of last year with me.  He will have Dr. Rossi added to the blood test when he sees him in a few weeks.  Hemoglobin and hematocrit and iron levels have been holding steady.  No chest pain or shortness of breath.  Considering some experimental options for the Parkinson's going forward.\par December 9, 2019.  Patient called and wanted to come in.  Saw Dr. Sohail Arana of neurology who told him he thinks he needs a pacemaker and that his low heart rate could be explaining some of his Parkinson's symptoms.  I put in a call to Dr. Arana and I am awaiting a response.  In the meantime reviewed the symptoms and the procedure with the patient.  At times he says he does have some lightheadedness and dizziness and wants the pacemaker again at other times is too nervous does not want to stay in hospital overnight and does not think he is having any symptoms different than the last few years.  Certainly no syncope and no definite near syncope.  Heart rate here was 48.  Blood pressure okay.  No recent change in medications.\par July 7, 2020.  Patient here in follow-up.  Remains in sinus bradycardia at 47 with an otherwise normal ECG except incomplete right bundle branch block.  Recent labs with cholesterol 209, HDL 68, .  Normal thyroid function.  Last week he felt dizzy for most of the week and most of the days on and off  somewhat lightheaded but not near syncopal, not vertigo.  This week however he feels perfectly back to himself so not that likely to be from conduction disease.  No other complaints.  He remains on Lipitor and Ranexa.\par August 9, 2021.  Patient here for preop cardiac evaluation due to an abnormal ECG.  Last here over a year ago.  Has been doing well for the most part but has developed mild Parkinson's.  Not having any issues with angina currently but upon pushing the history he does have some orthostatic dizziness now and then.  Even to the point now where he knows to stop and wait a few minutes when he gets out of the car etc. (Actually I had mentioned this in my note over a year ago and that his symptoms were probably orthostasis related to his Parkinson's.)  Here in fact there is a drop in his blood pressure from lying to sitting and is probably related to his parkinsonism.  Otherwise no symptoms or signs of heart failure unstable angina etc.  He always has a mildly abnormal ECG with some ST depressions in the inferior leads and V4 through 6 and the EKG done preop at [place] was not significantly changed from his previous EKGs of over a year ago.  He will be having surgery on both an inguinal and an abdominal hernia by Dr. Bo.\par September 10, 2021.  Patient here for urgent visit.  Called yesterday with a burning chest pain and I was unavailable and was told to speak with or see his internist Dr. Rossi.  He saw Dr. Rossi this morning and EKG was totally unchanged with his usual sinus bradycardia and minimal ST depressions.  He is now here. His surgery and the recovery was a little more difficult. And now for the last week or so he has been having his usual exertional chest burning relieved with rest. It does not seem to be progressive. No signs that he is anemic again but he is more fatigued since the surgery. He also talked about his mild orthostasis which again is more likely related to the Parkinson's. After long discussion we decided that if his troponin is positive but he is stable we will schedule him for an angiogram next week, and if troponin is negative we will try to double up on the Ranexa and consider a stress test or a CT angio of his coronaries.\par September 13, 2021. Spoke with patient.  Troponin negative.  Excellent lipid profile, normal hemoglobin A1c, normal BNP, sodium 131, hematocrit 37.  Patient actually feeling much better and would like to wait until the stress test scheduled at the beginning of October.  Only complaint is vague lightheadedness at times. \par October 6, 2021.  Patient returned for stress echo and follow-up.  He has been taking the Ranexa 1000 twice daily 1 day and 500 twice daily the next alternating for some reason of his own.  Has not really noticed any change in his symptoms.  On the stress test he got his symptoms at 4-1/2 minutes of a low level protocol with his heart rate 80 and there were ST depressions mostly in II and aVF but also in V3 through V6 that persisted in recovery.  He was able to do 10 minutes of the low level protocol to a heart rate of 111.  He got his usual chest burning that increased with exercise and took about 8 or 9 minutes of recovery to go away.  The echocardiogram post exercise was actually normal without wall motion abnormalities.  The resting echo itself had normal LV and RV function with mild to moderate MR and mild TR and was really unchanged from 2016.  After long discussion with patient and his wife we elected to schedule coronary angiography.\par October 13, 2021.Patient had cath yesterday.  70% stenosis in the mid LAD confirmed as significant with a positive IFR.  Stent was placed.  He should remain on dual antiplatelet therapy for 3 months.  I spoke with Dr. Man yesterday and I left a message for the patient this evening.  He should make follow-up appointment with me.\par October 29, 2021.  Patient returns in follow-up after his LAD stent as noted above.  EKG here is sinus at 45 with his usual mild ST depressions in leads II, V2 through 5. Feels wonderful since the stent but is also a little anxious ever since realizing that he needs a stent.  Still on Ranexa along with his statin and dual antiplatelet therapy and his Parkinson's medication.  Blood pressure running a little high today probably just from the stress.  Labs will be checked.

## 2021-10-29 NOTE — REVIEW OF SYSTEMS
[Depression] : depression [Feeling Fatigued] : not feeling fatigued [Weight Loss (___ Lbs)] : no recent weight loss [Dyspnea on exertion] : not dyspnea during exertion [Chest Discomfort] : no chest discomfort [Lower Ext Edema] : no extremity edema [Palpitations] : no palpitations [Syncope] : no syncope [Abdominal Pain] : no abdominal pain [Anxiety] : anxiety [Suicidal] : not suicidal [Negative] : Neurological [FreeTextEntry5] : see HPI [de-identified] : Parkinsonism and some orthostasis [de-identified] : Seems a little depressed and anxious

## 2021-11-01 LAB
ALBUMIN SERPL ELPH-MCNC: 4.3 G/DL
ALP BLD-CCNC: 89 U/L
ALT SERPL-CCNC: 15 U/L
ANION GAP SERPL CALC-SCNC: 12 MMOL/L
AST SERPL-CCNC: 15 U/L
BASOPHILS # BLD AUTO: 0.04 K/UL
BASOPHILS NFR BLD AUTO: 0.8 %
BILIRUB SERPL-MCNC: 0.5 MG/DL
BUN SERPL-MCNC: 15 MG/DL
CALCIUM SERPL-MCNC: 9.2 MG/DL
CHLORIDE SERPL-SCNC: 97 MMOL/L
CO2 SERPL-SCNC: 24 MMOL/L
CREAT SERPL-MCNC: 0.92 MG/DL
EOSINOPHIL # BLD AUTO: 0.45 K/UL
EOSINOPHIL NFR BLD AUTO: 8.6 %
GLUCOSE SERPL-MCNC: 117 MG/DL
HCT VFR BLD CALC: 40.5 %
HGB BLD-MCNC: 13.2 G/DL
IMM GRANULOCYTES NFR BLD AUTO: 0.6 %
LYMPHOCYTES # BLD AUTO: 1.11 K/UL
LYMPHOCYTES NFR BLD AUTO: 21.1 %
MAN DIFF?: NORMAL
MCHC RBC-ENTMCNC: 28.1 PG
MCHC RBC-ENTMCNC: 32.6 GM/DL
MCV RBC AUTO: 86.4 FL
MONOCYTES # BLD AUTO: 0.47 K/UL
MONOCYTES NFR BLD AUTO: 9 %
NEUTROPHILS # BLD AUTO: 3.15 K/UL
NEUTROPHILS NFR BLD AUTO: 59.9 %
PLATELET # BLD AUTO: 279 K/UL
POTASSIUM SERPL-SCNC: 4.3 MMOL/L
PROT SERPL-MCNC: 6.8 G/DL
RBC # BLD: 4.69 M/UL
RBC # FLD: 13.2 %
SODIUM SERPL-SCNC: 133 MMOL/L
WBC # FLD AUTO: 5.25 K/UL

## 2021-12-08 ENCOUNTER — APPOINTMENT (OUTPATIENT)
Dept: INTERNAL MEDICINE | Facility: CLINIC | Age: 76
End: 2021-12-08
Payer: MEDICARE

## 2021-12-08 VITALS
BODY MASS INDEX: 25.76 KG/M2 | TEMPERATURE: 97.2 F | WEIGHT: 170 LBS | HEART RATE: 55 BPM | HEIGHT: 68 IN | OXYGEN SATURATION: 98 %

## 2021-12-08 VITALS — SYSTOLIC BLOOD PRESSURE: 120 MMHG | DIASTOLIC BLOOD PRESSURE: 70 MMHG

## 2021-12-08 DIAGNOSIS — R06.2 WHEEZING: ICD-10-CM

## 2021-12-08 LAB
FLUAV SPEC QL CULT: NORMAL
FLUBV AG SPEC QL IA: NORMAL

## 2021-12-08 PROCEDURE — 87804 INFLUENZA ASSAY W/OPTIC: CPT | Mod: QW

## 2021-12-08 PROCEDURE — 99214 OFFICE O/P EST MOD 30 MIN: CPT | Mod: 25

## 2021-12-08 NOTE — HISTORY OF PRESENT ILLNESS
[FreeTextEntry8] : The patient had subjective wheezing, mild dyspnea and he couldn't sleep last night.  He feels he might have a "cold."  No fever, chills, sinus pain, ear pain, sore throat, colored phlegm, sick contacts.  He has a dry cough.  No dyspnea or wheezing now.  He used an Albuterol inhaler which helped.  No leg swelling.\par \par He had the Moderna booster five days ago- not clear if these symptoms started before or after the booster.\par \par He has had the flu vaccine.

## 2021-12-08 NOTE — ASSESSMENT
[FreeTextEntry1] : He has subjective wheezing and "cold" symptoms for a few days.  He appears well now with no fever and a normal lung exam.  He might have a viral URI.  He had COVID-19 testing yesterday that was negative.  He had the flu vaccine and a rapid flu test now is negative.  He can continue the Albuterol inhaler PRN and add a steroid inhaler BID. No need for antibiotics at this point but call PRN if he feels worse.\par \par He has a number of other medical issues that he brings up.  He has recovered from the hernia surgery but he now has a new abdominal wall hernia that is nontender and reducible.  He saw his surgeon and monitoring was advised for now.  \par \par He will be seeing Dr. Celis soon.  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  \par \par He has had a difficult time psychologically dealing with the medical issues and he was counseled.

## 2021-12-09 RX ORDER — AZITHROMYCIN 250 MG/1
250 TABLET, FILM COATED ORAL
Qty: 1 | Refills: 0 | Status: DISCONTINUED | COMMUNITY
Start: 2021-12-09 | End: 2021-12-09

## 2021-12-11 ENCOUNTER — TRANSCRIPTION ENCOUNTER (OUTPATIENT)
Age: 76
End: 2021-12-11

## 2021-12-17 ENCOUNTER — NON-APPOINTMENT (OUTPATIENT)
Age: 76
End: 2021-12-17

## 2021-12-17 ENCOUNTER — APPOINTMENT (OUTPATIENT)
Dept: CARDIOLOGY | Facility: CLINIC | Age: 76
End: 2021-12-17
Payer: MEDICARE

## 2021-12-17 VITALS — SYSTOLIC BLOOD PRESSURE: 128 MMHG | HEART RATE: 54 BPM | DIASTOLIC BLOOD PRESSURE: 76 MMHG

## 2021-12-17 VITALS
HEART RATE: 55 BPM | SYSTOLIC BLOOD PRESSURE: 143 MMHG | DIASTOLIC BLOOD PRESSURE: 84 MMHG | OXYGEN SATURATION: 99 % | WEIGHT: 167 LBS | HEIGHT: 68 IN | BODY MASS INDEX: 25.31 KG/M2

## 2021-12-17 PROCEDURE — 36415 COLL VENOUS BLD VENIPUNCTURE: CPT

## 2021-12-17 PROCEDURE — 99214 OFFICE O/P EST MOD 30 MIN: CPT

## 2021-12-17 PROCEDURE — 93000 ELECTROCARDIOGRAM COMPLETE: CPT

## 2021-12-17 NOTE — PHYSICAL EXAM
[General Appearance - Well Developed] : well developed [Normal Appearance] : normal appearance [Well Groomed] : well groomed [General Appearance - Well Nourished] : well nourished [No Deformities] : no deformities [General Appearance - In No Acute Distress] : no acute distress [Normal Conjunctiva] : the conjunctiva exhibited no abnormalities [Eyelids - No Xanthelasma] : the eyelids demonstrated no xanthelasmas [Normal Oral Mucosa] : normal oral mucosa [No Oral Pallor] : no oral pallor [No Oral Cyanosis] : no oral cyanosis [Normal Jugular Venous A Waves Present] : normal jugular venous A waves present [Normal Jugular Venous V Waves Present] : normal jugular venous V waves present [No Jugular Venous Wasserman A Waves] : no jugular venous wasserman A waves [Respiration, Rhythm And Depth] : normal respiratory rhythm and effort [Auscultation Breath Sounds / Voice Sounds] : lungs were clear to auscultation bilaterally [Exaggerated Use Of Accessory Muscles For Inspiration] : no accessory muscle use [Heart Rate And Rhythm] : heart rate and rhythm were normal [Heart Sounds] : normal S1 and S2 [Murmurs] : no murmurs present [Bradycardic ___] : the heart rate was bradycardic at [unfilled] bpm [Regular-Premature Beats] : the rhythm was regular with premature beats [Abdomen Soft] : soft [Abdomen Tenderness] : non-tender [Abdomen Mass (___ Cm)] : no abdominal mass palpated [Abnormal Walk] : normal gait [Gait - Sufficient For Exercise Testing] : the gait was sufficient for exercise testing [Nail Clubbing] : no clubbing of the fingernails [Cyanosis, Localized] : no localized cyanosis [Petechial Hemorrhages (___cm)] : no petechial hemorrhages [Skin Color & Pigmentation] : normal skin color and pigmentation [] : no rash [No Venous Stasis] : no venous stasis [Skin Lesions] : no skin lesions [No Skin Ulcers] : no skin ulcer [No Xanthoma] : no  xanthoma was observed [Oriented To Time, Place, And Person] : oriented to person, place, and time [Affect] : the affect was normal [Mood] : the mood was normal [FreeTextEntry1] : Seems less depressed, but more anxious.

## 2021-12-17 NOTE — HISTORY OF PRESENT ILLNESS
[FreeTextEntry1] : In 2012 the patient presented with exertional chest burning. Abnormal stress echocardiogram. Cardiac catheterization revealed nonobstructive coronary disease (only 40% distal LAD). He been maintained on Ranexa with good relief of his symptoms. \par December 6, 2016. The last couple weeks the patient has been getting his chest tightness, primarily at rest when he stressed. He's had to use nitroglycerin at least once a week. He's had no chest discomfort when he walks on the treadmill or exercise in the gym. Patient also feels slightly lightheaded once in a great while. He usually is sitting when it occurs. He does not have it when he is walking or standing.\par Stress echo on December 16 was negative and the echo again showed mild to moderate MR, unchanged.\par December 4, 2017. One year since last visit. Symptoms, maybe once a week, and usually related to emotional upset and stress. Responds to nitroglycerin within 5 minutes. He never increased his Ranexa. He was complaining about the cost of Ranexa and we discussed the rationale for Ranexa versus other medications in his case. Beta blockers must be avoided given his sinus bradycardia. He had labs with Dr. Rossi on September 28. He remains with iron deficiency anemia, but he had a negative GI workup in 2014. Since then he has increased his iron from once a day to twice a day. His lipids had a cholesterol of 147, HDL 70, LDL 63, triglycerides 68, and he remains on atorvastatin. His hemoglobin A1c was 5.5, and the rest of his labs were unremarkable. He had a flu shot with Dr. Rossi.\par April 24, 2018. Patient has recently been noticing that he is getting more short of breath with exertion with some chest pressure. Can barely walk a block or 2. 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 The orthopedist wants to give him a synthetic cartilage injection and he is seeing a vascular doctor later this week because of the swelling in his legs, which is a little more on the right side, where his knee is acting up. He said he walks and feels like an old man. EKG is sinus bradycardia and unchanged. Found to have severe iron deficiency anemia and referred to GI.\par June 7, 2018. The patient returns in followup. Had colonoscopy and endoscopy that were mostly negative. Still considering capsule study. In the meantime, he is on iron twice a day, and his levels have come up. He had been transfused 3 units at the beginning. He still has some orthostatic dizziness at times, but otherwise most of his symptoms are gone. He is getting ready for a trip to [facility]. His EKG remains with sinus bradycardia, and some nonspecific ST changes, but unchanged.\par October 9, 2018. Patient was at hematology on October 5, and a heart rate of 42, was recorded. I was told he had a low heart rate and needed an appointment and he was scheduled for today. Now the patient says when he called he told them he was dizzy and having symptoms, which was not related to me. Here, he is in sinus bradycardia at 46, with mild ST depressions V4 through 6 as well as lead II and aVF. His dizziness is mostly when he changes positions that'll last for a few seconds, and then passes. He is not limited in his exercise capacity and does not quite get near syncopal. No other complaints. Able to walk 20 minutes the other day without stopping.\par November 21, 2019.  First visit in over a year.  Slightly depressed over diagnosis of Parkinson's but so far mild case and doing well.  On Azilect 0.5 twice daily and resagiline 1 mg daily.  Heart rate still slow at 41 but no syncope or near syncope.  Occasionally feels a little dizzy sitting or walking but does not sound like near syncope.  Changed his atorvastatin to every other day on his own but has not had lipids since October of last year with me.  He will have Dr. Rossi added to the blood test when he sees him in a few weeks.  Hemoglobin and hematocrit and iron levels have been holding steady.  No chest pain or shortness of breath.  Considering some experimental options for the Parkinson's going forward.\par December 9, 2019.  Patient called and wanted to come in.  Saw Dr. Sohail Arana of neurology who told him he thinks he needs a pacemaker and that his low heart rate could be explaining some of his Parkinson's symptoms.  I put in a call to Dr. Arana and I am awaiting a response.  In the meantime reviewed the symptoms and the procedure with the patient.  At times he says he does have some lightheadedness and dizziness and wants the pacemaker again at other times is too nervous does not want to stay in hospital overnight and does not think he is having any symptoms different than the last few years.  Certainly no syncope and no definite near syncope.  Heart rate here was 48.  Blood pressure okay.  No recent change in medications.\par July 7, 2020.  Patient here in follow-up.  Remains in sinus bradycardia at 47 with an otherwise normal ECG except incomplete right bundle branch block.  Recent labs with cholesterol 209, HDL 68, .  Normal thyroid function.  Last week he felt dizzy for most of the week and most of the days on and off  somewhat lightheaded but not near syncopal, not vertigo.  This week however he feels perfectly back to himself so not that likely to be from conduction disease.  No other complaints.  He remains on Lipitor and Ranexa.\par August 9, 2021.  Patient here for preop cardiac evaluation due to an abnormal ECG.  Last here over a year ago.  Has been doing well for the most part but has developed mild Parkinson's.  Not having any issues with angina currently but upon pushing the history he does have some orthostatic dizziness now and then.  Even to the point now where he knows to stop and wait a few minutes when he gets out of the car etc. (Actually I had mentioned this in my note over a year ago and that his symptoms were probably orthostasis related to his Parkinson's.)  Here in fact there is a drop in his blood pressure from lying to sitting and is probably related to his parkinsonism.  Otherwise no symptoms or signs of heart failure unstable angina etc.  He always has a mildly abnormal ECG with some ST depressions in the inferior leads and V4 through 6 and the EKG done preop at [place] was not significantly changed from his previous EKGs of over a year ago.  He will be having surgery on both an inguinal and an abdominal hernia by Dr. Bo.\par September 10, 2021.  Patient here for urgent visit.  Called yesterday with a burning chest pain and I was unavailable and was told to speak with or see his internist Dr. Rossi.  He saw Dr. Rossi this morning and EKG was totally unchanged with his usual sinus bradycardia and minimal ST depressions.  He is now here. His surgery and the recovery was a little more difficult. And now for the last week or so he has been having his usual exertional chest burning relieved with rest. It does not seem to be progressive. No signs that he is anemic again but he is more fatigued since the surgery. He also talked about his mild orthostasis which again is more likely related to the Parkinson's. After long discussion we decided that if his troponin is positive but he is stable we will schedule him for an angiogram next week, and if troponin is negative we will try to double up on the Ranexa and consider a stress test or a CT angio of his coronaries.\par September 13, 2021. Spoke with patient.  Troponin negative.  Excellent lipid profile, normal hemoglobin A1c, normal BNP, sodium 131, hematocrit 37.  Patient actually feeling much better and would like to wait until the stress test scheduled at the beginning of October.  Only complaint is vague lightheadedness at times. \par October 6, 2021.  Patient returned for stress echo and follow-up.  He has been taking the Ranexa 1000 twice daily 1 day and 500 twice daily the next alternating for some reason of his own.  Has not really noticed any change in his symptoms.  On the stress test he got his symptoms at 4-1/2 minutes of a low level protocol with his heart rate 80 and there were ST depressions mostly in II and aVF but also in V3 through V6 that persisted in recovery.  He was able to do 10 minutes of the low level protocol to a heart rate of 111.  He got his usual chest burning that increased with exercise and took about 8 or 9 minutes of recovery to go away.  The echocardiogram post exercise was actually normal without wall motion abnormalities.  The resting echo itself had normal LV and RV function with mild to moderate MR and mild TR and was really unchanged from 2016.  After long discussion with patient and his wife we elected to schedule coronary angiography.\par October 13, 2021.Patient had cath yesterday.  70% stenosis in the mid LAD confirmed as significant with a positive IFR.  Stent was placed.  He should remain on dual antiplatelet therapy for 3 months.  I spoke with Dr. Man yesterday and I left a message for the patient this evening.  He should make follow-up appointment with me.\par October 29, 2021.  Patient returns in follow-up after his LAD stent as noted above.  EKG here is sinus at 45 with his usual mild ST depressions in leads II, V2 through 5. Feels wonderful since the stent but is also a little anxious ever since realizing that he needs a stent.  Still on Ranexa along with his statin and dual antiplatelet therapy and his Parkinson's medication.  Blood pressure running a little high today probably just from the stress.  Labs will be checked.\par December 17, 2021.  Patient here in follow-up.  Doing well from a cardiac point of view.  Another abdominal hernia popped up but he may need surgery again but I told him to ideally wait 6 months from the time of his stent.  He also needs injections in his knee and possibly hip and may need surgery in the future.  Good spirits with no other symptoms or complaints.  Still on dual antiplatelet therapy.  May be going away in January but a lot of stress with his mother-in-law and her Alzheimer's etc.

## 2021-12-17 NOTE — REVIEW OF SYSTEMS
[Feeling Fatigued] : not feeling fatigued [Weight Loss (___ Lbs)] : [unfilled] ~Ulb weight loss [Dyspnea on exertion] : not dyspnea during exertion [Chest Discomfort] : no chest discomfort [Lower Ext Edema] : no extremity edema [Palpitations] : no palpitations [Syncope] : no syncope [Abdominal Pain] : no abdominal pain [Depression] : depression [Anxiety] : anxiety [Suicidal] : not suicidal [Negative] : Heme/Lymph [FreeTextEntry5] : see HPI [FreeTextEntry7] : Abdominal hernia [de-identified] : Parkinsonism and some orthostasis [de-identified] : Seems a little depressed and anxious

## 2021-12-20 ENCOUNTER — NON-APPOINTMENT (OUTPATIENT)
Age: 76
End: 2021-12-20

## 2021-12-20 LAB
ALBUMIN SERPL ELPH-MCNC: 4.4 G/DL
ALP BLD-CCNC: 86 U/L
ALT SERPL-CCNC: 12 U/L
ANION GAP SERPL CALC-SCNC: 12 MMOL/L
AST SERPL-CCNC: 13 U/L
BASOPHILS # BLD AUTO: 0.07 K/UL
BASOPHILS NFR BLD AUTO: 1 %
BILIRUB SERPL-MCNC: 0.8 MG/DL
BUN SERPL-MCNC: 11 MG/DL
CALCIUM SERPL-MCNC: 9.5 MG/DL
CHLORIDE SERPL-SCNC: 94 MMOL/L
CHOLEST SERPL-MCNC: 176 MG/DL
CO2 SERPL-SCNC: 23 MMOL/L
CREAT SERPL-MCNC: 0.93 MG/DL
EOSINOPHIL # BLD AUTO: 0.97 K/UL
EOSINOPHIL NFR BLD AUTO: 13.5 %
ESTIMATED AVERAGE GLUCOSE: 120 MG/DL
GLUCOSE SERPL-MCNC: 120 MG/DL
HBA1C MFR BLD HPLC: 5.8 %
HCT VFR BLD CALC: 41 %
HDLC SERPL-MCNC: 81 MG/DL
HGB BLD-MCNC: 13.7 G/DL
IMM GRANULOCYTES NFR BLD AUTO: 0.3 %
LDLC SERPL CALC-MCNC: 77 MG/DL
LYMPHOCYTES # BLD AUTO: 1.2 K/UL
LYMPHOCYTES NFR BLD AUTO: 16.7 %
MAN DIFF?: NORMAL
MCHC RBC-ENTMCNC: 29.4 PG
MCHC RBC-ENTMCNC: 33.4 GM/DL
MCV RBC AUTO: 88 FL
MONOCYTES # BLD AUTO: 0.56 K/UL
MONOCYTES NFR BLD AUTO: 7.8 %
NEUTROPHILS # BLD AUTO: 4.36 K/UL
NEUTROPHILS NFR BLD AUTO: 60.7 %
NONHDLC SERPL-MCNC: 95 MG/DL
NT-PROBNP SERPL-MCNC: 139 PG/ML
PLATELET # BLD AUTO: 314 K/UL
POTASSIUM SERPL-SCNC: 4.4 MMOL/L
PROT SERPL-MCNC: 6.8 G/DL
RBC # BLD: 4.66 M/UL
RBC # FLD: 14 %
SODIUM SERPL-SCNC: 129 MMOL/L
TRIGL SERPL-MCNC: 92 MG/DL
WBC # FLD AUTO: 7.18 K/UL

## 2022-02-14 ENCOUNTER — APPOINTMENT (OUTPATIENT)
Dept: CARDIOLOGY | Facility: CLINIC | Age: 77
End: 2022-02-14
Payer: MEDICARE

## 2022-02-14 VITALS
OXYGEN SATURATION: 100 % | BODY MASS INDEX: 25.61 KG/M2 | HEART RATE: 69 BPM | SYSTOLIC BLOOD PRESSURE: 123 MMHG | HEIGHT: 68 IN | WEIGHT: 169 LBS | DIASTOLIC BLOOD PRESSURE: 79 MMHG

## 2022-02-14 DIAGNOSIS — M16.11 UNILATERAL PRIMARY OSTEOARTHRITIS, RIGHT HIP: ICD-10-CM

## 2022-02-14 PROCEDURE — 99214 OFFICE O/P EST MOD 30 MIN: CPT

## 2022-02-14 NOTE — HISTORY OF PRESENT ILLNESS
[FreeTextEntry1] : In 2012 the patient presented with exertional chest burning. Abnormal stress echocardiogram. Cardiac catheterization revealed nonobstructive coronary disease (only 40% distal LAD). He been maintained on Ranexa with good relief of his symptoms. \par December 6, 2016. The last couple weeks the patient has been getting his chest tightness, primarily at rest when he stressed. He's had to use nitroglycerin at least once a week. He's had no chest discomfort when he walks on the treadmill or exercise in the gym. Patient also feels slightly lightheaded once in a great while. He usually is sitting when it occurs. He does not have it when he is walking or standing.\par Stress echo on December 16 was negative and the echo again showed mild to moderate MR, unchanged.\par December 4, 2017. One year since last visit. Symptoms, maybe once a week, and usually related to emotional upset and stress. Responds to nitroglycerin within 5 minutes. He never increased his Ranexa. He was complaining about the cost of Ranexa and we discussed the rationale for Ranexa versus other medications in his case. Beta blockers must be avoided given his sinus bradycardia. He had labs with Dr. Rossi on September 28. He remains with iron deficiency anemia, but he had a negative GI workup in 2014. Since then he has increased his iron from once a day to twice a day. His lipids had a cholesterol of 147, HDL 70, LDL 63, triglycerides 68, and he remains on atorvastatin. His hemoglobin A1c was 5.5, and the rest of his labs were unremarkable. He had a flu shot with Dr. Rossi.\par April 24, 2018. Patient has recently been noticing that he is getting more short of breath with exertion with some chest pressure. Can barely walk a block or 2. 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 The orthopedist wants to give him a synthetic cartilage injection and he is seeing a vascular doctor later this week because of the swelling in his legs, which is a little more on the right side, where his knee is acting up. He said he walks and feels like an old man. EKG is sinus bradycardia and unchanged. Found to have severe iron deficiency anemia and referred to GI.\par June 7, 2018. The patient returns in followup. Had colonoscopy and endoscopy that were mostly negative. Still considering capsule study. In the meantime, he is on iron twice a day, and his levels have come up. He had been transfused 3 units at the beginning. He still has some orthostatic dizziness at times, but otherwise most of his symptoms are gone. He is getting ready for a trip to [facility]. His EKG remains with sinus bradycardia, and some nonspecific ST changes, but unchanged.\par October 9, 2018. Patient was at hematology on October 5, and a heart rate of 42, was recorded. I was told he had a low heart rate and needed an appointment and he was scheduled for today. Now the patient says when he called he told them he was dizzy and having symptoms, which was not related to me. Here, he is in sinus bradycardia at 46, with mild ST depressions V4 through 6 as well as lead II and aVF. His dizziness is mostly when he changes positions that'll last for a few seconds, and then passes. He is not limited in his exercise capacity and does not quite get near syncopal. No other complaints. Able to walk 20 minutes the other day without stopping.\par November 21, 2019.  First visit in over a year.  Slightly depressed over diagnosis of Parkinson's but so far mild case and doing well.  On Azilect 0.5 twice daily and resagiline 1 mg daily.  Heart rate still slow at 41 but no syncope or near syncope.  Occasionally feels a little dizzy sitting or walking but does not sound like near syncope.  Changed his atorvastatin to every other day on his own but has not had lipids since October of last year with me.  He will have Dr. Rossi added to the blood test when he sees him in a few weeks.  Hemoglobin and hematocrit and iron levels have been holding steady.  No chest pain or shortness of breath.  Considering some experimental options for the Parkinson's going forward.\par December 9, 2019.  Patient called and wanted to come in.  Saw Dr. Sohail Arana of neurology who told him he thinks he needs a pacemaker and that his low heart rate could be explaining some of his Parkinson's symptoms.  I put in a call to Dr. Arana and I am awaiting a response.  In the meantime reviewed the symptoms and the procedure with the patient.  At times he says he does have some lightheadedness and dizziness and wants the pacemaker again at other times is too nervous does not want to stay in hospital overnight and does not think he is having any symptoms different than the last few years.  Certainly no syncope and no definite near syncope.  Heart rate here was 48.  Blood pressure okay.  No recent change in medications.\par July 7, 2020.  Patient here in follow-up.  Remains in sinus bradycardia at 47 with an otherwise normal ECG except incomplete right bundle branch block.  Recent labs with cholesterol 209, HDL 68, .  Normal thyroid function.  Last week he felt dizzy for most of the week and most of the days on and off  somewhat lightheaded but not near syncopal, not vertigo.  This week however he feels perfectly back to himself so not that likely to be from conduction disease.  No other complaints.  He remains on Lipitor and Ranexa.\par August 9, 2021.  Patient here for preop cardiac evaluation due to an abnormal ECG.  Last here over a year ago.  Has been doing well for the most part but has developed mild Parkinson's.  Not having any issues with angina currently but upon pushing the history he does have some orthostatic dizziness now and then.  Even to the point now where he knows to stop and wait a few minutes when he gets out of the car etc. (Actually I had mentioned this in my note over a year ago and that his symptoms were probably orthostasis related to his Parkinson's.)  Here in fact there is a drop in his blood pressure from lying to sitting and is probably related to his parkinsonism.  Otherwise no symptoms or signs of heart failure unstable angina etc.  He always has a mildly abnormal ECG with some ST depressions in the inferior leads and V4 through 6 and the EKG done preop at [facility] was not significantly changed from his previous EKGs of over a year ago.  He will be having surgery on both an inguinal and an abdominal hernia by Dr. Bo.\par September 10, 2021.  Patient here for urgent visit.  Called yesterday with a burning chest pain and I was unavailable and was told to speak with or see his internist Dr. Rossi.  He saw Dr. Rossi this morning and EKG was totally unchanged with his usual sinus bradycardia and minimal ST depressions.  He is now here. His surgery and the recovery was a little more difficult. And now for the last week or so he has been having his usual exertional chest burning relieved with rest. It does not seem to be progressive. No signs that he is anemic again but he is more fatigued since the surgery. He also talked about his mild orthostasis which again is more likely related to the Parkinson's. After long discussion we decided that if his troponin is positive but he is stable we will schedule him for an angiogram next week, and if troponin is negative we will try to double up on the Ranexa and consider a stress test or a CT angio of his coronaries.\par September 13, 2021. Spoke with patient.  Troponin negative.  Excellent lipid profile, normal hemoglobin A1c, normal BNP, sodium 131, hematocrit 37.  Patient actually feeling much better and would like to wait until the stress test scheduled at the beginning of October.  Only complaint is vague lightheadedness at times. \par October 6, 2021.  Patient returned for stress echo and follow-up.  He has been taking the Ranexa 1000 twice daily 1 day and 500 twice daily the next alternating for some reason of his own.  Has not really noticed any change in his symptoms.  On the stress test he got his symptoms at 4-1/2 minutes of a low level protocol with his heart rate 80 and there were ST depressions mostly in II and aVF but also in V3 through V6 that persisted in recovery.  He was able to do 10 minutes of the low level protocol to a heart rate of 111.  He got his usual chest burning that increased with exercise and took about 8 or 9 minutes of recovery to go away.  The echocardiogram post exercise was actually normal without wall motion abnormalities.  The resting echo itself had normal LV and RV function with mild to moderate MR and mild TR and was really unchanged from 2016.  After long discussion with patient and his wife we elected to schedule coronary angiography.\par October 13, 2021.Patient had cath yesterday.  70% stenosis in the mid LAD confirmed as significant with a positive IFR.  Stent was placed.  He should remain on dual antiplatelet therapy for 3 months.  I spoke with Dr. Man yesterday and I left a message for the patient this evening.  He should make follow-up appointment with me.\par October 29, 2021.  Patient returns in follow-up after his LAD stent as noted above.  EKG here is sinus at 45 with his usual mild ST depressions in leads II, V2 through 5. Feels wonderful since the stent but is also a little anxious ever since realizing that he needs a stent.  Still on Ranexa along with his statin and dual antiplatelet therapy and his Parkinson's medication.  Blood pressure running a little high today probably just from the stress.  Labs will be checked.\par December 17, 2021.  Patient here in follow-up.  Doing well from a cardiac point of view.  Another abdominal hernia popped up but he may need surgery again but I told him to ideally wait 6 months from the time of his stent.  He also needs injections in his knee and possibly hip and may need surgery in the future.  Good spirits with no other symptoms or complaints.  Still on dual antiplatelet therapy.  May be going away in January but a lot of stress with his mother-in-law and her Alzheimer's etc.\par February 14, 2022.  Semiurgent visit as patient just found out he is going to need hip replacement surgery.  Will probably be doing it with Dr. Nick Mackenzie at [facility].  Also discussed his hernia surgery with Dr. Bo to and most likely he will have the hernia surgery first.  For now he is willing to wait a little longer and stay on dual antiplatelet therapy.  No chest pain no shortness of breath no other new issues.  Reviewed the pros and cons of waiting 6 months versus waiting a little last in order to have the surgery.

## 2022-02-14 NOTE — REVIEW OF SYSTEMS
[Weight Gain (___ Lbs)] : [unfilled] ~Ulb weight gain [Feeling Fatigued] : not feeling fatigued [Weight Loss (___ Lbs)] : no recent weight loss [Dyspnea on exertion] : not dyspnea during exertion [Chest Discomfort] : no chest discomfort [Lower Ext Edema] : no extremity edema [Palpitations] : no palpitations [Syncope] : no syncope [Abdominal Pain] : no abdominal pain [Depression] : depression [Anxiety] : anxiety [Suicidal] : not suicidal [Negative] : Heme/Lymph [FreeTextEntry5] : see HPI [FreeTextEntry7] : Abdominal hernia [FreeTextEntry9] : Needs hip replacement [de-identified] : Parkinsonism and some orthostasis [de-identified] : Seems a little depressed and anxious

## 2022-02-14 NOTE — REASON FOR VISIT
[FreeTextEntry1] : The patient is a 76-year-old male with a history of angina with nonobstructive coronary artery disease,  and sinus node dysfunction and iron deficiency anemia here because of chest pain. Now here s/p stent to LAD.

## 2022-03-11 ENCOUNTER — APPOINTMENT (OUTPATIENT)
Dept: CARDIOLOGY | Facility: CLINIC | Age: 77
End: 2022-03-11
Payer: MEDICARE

## 2022-03-11 ENCOUNTER — NON-APPOINTMENT (OUTPATIENT)
Age: 77
End: 2022-03-11

## 2022-03-11 VITALS
SYSTOLIC BLOOD PRESSURE: 130 MMHG | HEIGHT: 68 IN | DIASTOLIC BLOOD PRESSURE: 78 MMHG | BODY MASS INDEX: 25.76 KG/M2 | HEART RATE: 59 BPM | OXYGEN SATURATION: 99 % | WEIGHT: 170 LBS

## 2022-03-11 DIAGNOSIS — Z01.810 ENCOUNTER FOR PREPROCEDURAL CARDIOVASCULAR EXAMINATION: ICD-10-CM

## 2022-03-11 PROCEDURE — 36415 COLL VENOUS BLD VENIPUNCTURE: CPT

## 2022-03-11 PROCEDURE — 93000 ELECTROCARDIOGRAM COMPLETE: CPT

## 2022-03-11 PROCEDURE — 99215 OFFICE O/P EST HI 40 MIN: CPT

## 2022-03-13 NOTE — REVIEW OF SYSTEMS
[Depression] : depression [Anxiety] : anxiety [Negative] : Heme/Lymph [Weight Gain (___ Lbs)] : no recent weight gain [Feeling Fatigued] : not feeling fatigued [Weight Loss (___ Lbs)] : no recent weight loss [Dyspnea on exertion] : not dyspnea during exertion [Chest Discomfort] : no chest discomfort [Lower Ext Edema] : no extremity edema [Palpitations] : no palpitations [Syncope] : no syncope [Abdominal Pain] : no abdominal pain [Suicidal] : not suicidal [FreeTextEntry5] : see HPI [FreeTextEntry7] : Abdominal hernia [FreeTextEntry9] : Needs hip replacement [de-identified] : Parkinsonism and some orthostasis [de-identified] : Seems a little depressed and anxious

## 2022-03-13 NOTE — HISTORY OF PRESENT ILLNESS
[Preoperative Visit] : for a medical evaluation prior to surgery [Scheduled Procedure ___] : a [unfilled] [Date of Surgery ___] : on [unfilled] [Cardiovascular Disease] : cardiovascular disease [Anti-Platelet Agents] : anti-platelet agents [Frequent Aspirin Use] : frequent aspirin use [Prior Anesthesia] : Prior anesthesia [Electrocardiogram] : ~T an ECG ~C was performed [Echocardiogram] : ~T an echocardiogram ~C was performed [Cardiac Catheterization  (Diagnostic)] : cardiac catheterization ~T ~C was performed [Cardiovascular Stress Test] : a cardiac stress test ~T ~C was performed [Good] : Good [Surgeon Name ___] : surgeon: [unfilled] [Fever] : no fever [Chills] : no chills [Fatigue] : no fatigue [Chest Pain] : no chest pain [Cough] : no cough [Dyspnea] : no dyspnea [Dysuria] : no dysuria [Urinary Frequency] : no urinary frequency [Nausea] : no nausea [Vomiting] : no vomiting [Diarrhea] : no diarrhea [Abdominal Pain] : no abdominal pain [Easy Bruising] : no easy bruising [Poor Exercise Tolerance] : no poor exercise tolerance [Lower Extremity Swelling] : no lower extremity swelling [Diabetes] : no diabetes [Pulmonary Disease] : no pulmonary disease [Nicotine Dependence] : no nicotine dependence [Alcohol Use] : no  alcohol use [Renal Disease] : no renal disease [GI Disease] : no gastrointestinal disease [Sleep Apnea] : no sleep apnea [Thromboembolic Problems] : no thromboembolic problems [Frequent use of NSAIDs] : no use of NSAIDs [Impaired Immunity] : no impaired immunity [Transfusion Reaction] : no transfusion reaction [Steroid Use in Last 6 Months] : no steroid use in the last six months [Prev Anesthesia Reaction] : no previous anesthesia reaction [Anesthesia Reaction] : no anesthesia reaction [Sudden Death] : no sudden death [Clotting Disorder] : no clotting disorder [Bleeding Disorder] : no bleeding disorder [FreeTextEntry1] : hernia repair [FreeTextEntry3] : Dr. Mancera [FreeTextEntry2] : 8/11/21 [FreeTextEntry4] : The patient is here for a medical clearance prior to planned hernia repair.  He has two abdominal wall hernias to be repaired on 8/11/21.\par \par he is active and can walk 0.5-1 mile at a moderate pace without difficulty.  He also swims.  He denies chest pain or dyspnea with exertion.  He denies orthopnea or leg edema.  He hasn't needed to take nitroglycerin in at least 1-2 years.\par \par No new GI or  symptoms.

## 2022-03-13 NOTE — PHYSICAL EXAM
[General Appearance - Well Developed] : well developed [Normal Appearance] : normal appearance [Well Groomed] : well groomed [General Appearance - Well Nourished] : well nourished [No Deformities] : no deformities [General Appearance - In No Acute Distress] : no acute distress [Normal Conjunctiva] : the conjunctiva exhibited no abnormalities [Eyelids - No Xanthelasma] : the eyelids demonstrated no xanthelasmas [Normal Oral Mucosa] : normal oral mucosa [No Oral Pallor] : no oral pallor [No Oral Cyanosis] : no oral cyanosis [Normal Jugular Venous A Waves Present] : normal jugular venous A waves present [Normal Jugular Venous V Waves Present] : normal jugular venous V waves present [No Jugular Venous Wasserman A Waves] : no jugular venous wasserman A waves [Respiration, Rhythm And Depth] : normal respiratory rhythm and effort [Exaggerated Use Of Accessory Muscles For Inspiration] : no accessory muscle use [Auscultation Breath Sounds / Voice Sounds] : lungs were clear to auscultation bilaterally [Heart Rate And Rhythm] : heart rate and rhythm were normal [Heart Sounds] : normal S1 and S2 [Murmurs] : no murmurs present [Bradycardic ___] : the heart rate was bradycardic at [unfilled] bpm [Regular-Premature Beats] : the rhythm was regular with premature beats [Abdomen Soft] : soft [Abdomen Tenderness] : non-tender [Abdomen Mass (___ Cm)] : no abdominal mass palpated [Abnormal Walk] : normal gait [Gait - Sufficient For Exercise Testing] : the gait was sufficient for exercise testing [Nail Clubbing] : no clubbing of the fingernails [Cyanosis, Localized] : no localized cyanosis [Petechial Hemorrhages (___cm)] : no petechial hemorrhages [Skin Color & Pigmentation] : normal skin color and pigmentation [] : no rash [No Venous Stasis] : no venous stasis [Skin Lesions] : no skin lesions [No Skin Ulcers] : no skin ulcer [No Xanthoma] : no  xanthoma was observed [Oriented To Time, Place, And Person] : oriented to person, place, and time [Affect] : the affect was normal [Mood] : the mood was normal [FreeTextEntry1] : Seems less depressed, and a little anxious.

## 2022-03-13 NOTE — DISCUSSION/SUMMARY
[Procedure Intermediate Risk] : the procedure risk is intermediate [Patient Intermediate Risk] : the patient is an intermediate risk [As per surgery] : as per surgery [Continue] : Continue medications as currently directed [Optimized for Surgery] : the patient is optimized for surgery [FreeTextEntry3] : Continue all medications including aspirin but okay to hold Plavix prior to surgery. [FreeTextEntry1] : The patient had stable angina, no congestive heart failure, and a mildly abnormal but unchanged EKG both in terms of his sinus bradycardia at rest and his mild ST abnormalities.  Now since his LAD stent in October he has remained asymptomatic and stable.  He does have some orthostasis probably from his Parkinson's so volume depletion should be avoided.  I do not believe he needs any further cardiac work-up at this time and should not be considered to be a high risk for the upcoming surgery and anesthesia.

## 2022-03-14 LAB
ALBUMIN SERPL ELPH-MCNC: 4.4 G/DL
ALP BLD-CCNC: 87 U/L
ALT SERPL-CCNC: 14 U/L
ANION GAP SERPL CALC-SCNC: 16 MMOL/L
APTT BLD: 31.6 SEC
AST SERPL-CCNC: 17 U/L
BASOPHILS # BLD AUTO: 0.05 K/UL
BASOPHILS NFR BLD AUTO: 1 %
BILIRUB SERPL-MCNC: 0.5 MG/DL
BUN SERPL-MCNC: 14 MG/DL
CALCIUM SERPL-MCNC: 9.1 MG/DL
CHLORIDE SERPL-SCNC: 100 MMOL/L
CHOLEST SERPL-MCNC: 203 MG/DL
CO2 SERPL-SCNC: 18 MMOL/L
CREAT SERPL-MCNC: 0.82 MG/DL
EGFR: 92 ML/MIN/1.73M2
EOSINOPHIL # BLD AUTO: 0.61 K/UL
EOSINOPHIL NFR BLD AUTO: 12.3 %
ESTIMATED AVERAGE GLUCOSE: 120 MG/DL
GLUCOSE SERPL-MCNC: 131 MG/DL
HBA1C MFR BLD HPLC: 5.8 %
HCT VFR BLD CALC: 39 %
HDLC SERPL-MCNC: 75 MG/DL
HGB BLD-MCNC: 13.2 G/DL
IMM GRANULOCYTES NFR BLD AUTO: 0.8 %
INR PPP: 1.01 RATIO
LDLC SERPL CALC-MCNC: 105 MG/DL
LYMPHOCYTES # BLD AUTO: 0.94 K/UL
LYMPHOCYTES NFR BLD AUTO: 18.9 %
MAN DIFF?: NORMAL
MCHC RBC-ENTMCNC: 29.9 PG
MCHC RBC-ENTMCNC: 33.8 GM/DL
MCV RBC AUTO: 88.4 FL
MONOCYTES # BLD AUTO: 0.54 K/UL
MONOCYTES NFR BLD AUTO: 10.9 %
NEUTROPHILS # BLD AUTO: 2.79 K/UL
NEUTROPHILS NFR BLD AUTO: 56.1 %
NONHDLC SERPL-MCNC: 128 MG/DL
NT-PROBNP SERPL-MCNC: 145 PG/ML
PLATELET # BLD AUTO: 287 K/UL
POTASSIUM SERPL-SCNC: 4.4 MMOL/L
PROT SERPL-MCNC: 6.6 G/DL
PT BLD: 11.9 SEC
RBC # BLD: 4.41 M/UL
RBC # FLD: 12.9 %
SODIUM SERPL-SCNC: 135 MMOL/L
TRIGL SERPL-MCNC: 115 MG/DL
WBC # FLD AUTO: 4.97 K/UL

## 2022-03-18 ENCOUNTER — APPOINTMENT (OUTPATIENT)
Dept: INTERNAL MEDICINE | Facility: CLINIC | Age: 77
End: 2022-03-18
Payer: MEDICARE

## 2022-03-18 VITALS
WEIGHT: 172 LBS | SYSTOLIC BLOOD PRESSURE: 124 MMHG | TEMPERATURE: 97.8 F | OXYGEN SATURATION: 99 % | HEIGHT: 68 IN | HEART RATE: 52 BPM | DIASTOLIC BLOOD PRESSURE: 80 MMHG | BODY MASS INDEX: 26.07 KG/M2

## 2022-03-18 PROCEDURE — 99213 OFFICE O/P EST LOW 20 MIN: CPT | Mod: 25

## 2022-03-18 RX ORDER — DOXYCYCLINE HYCLATE 100 MG/1
100 TABLET ORAL
Qty: 14 | Refills: 0 | Status: COMPLETED | COMMUNITY
Start: 2021-12-09 | End: 2022-03-18

## 2022-03-21 ENCOUNTER — APPOINTMENT (OUTPATIENT)
Dept: ORTHOPEDIC SURGERY | Facility: CLINIC | Age: 77
End: 2022-03-21
Payer: MEDICARE

## 2022-03-21 VITALS
HEIGHT: 69.5 IN | WEIGHT: 166 LBS | BODY MASS INDEX: 24.04 KG/M2 | SYSTOLIC BLOOD PRESSURE: 139 MMHG | DIASTOLIC BLOOD PRESSURE: 79 MMHG | HEART RATE: 52 BPM

## 2022-03-21 DIAGNOSIS — M16.11 UNILATERAL PRIMARY OSTEOARTHRITIS, RIGHT HIP: ICD-10-CM

## 2022-03-21 DIAGNOSIS — K42.0 UMBILICAL HERNIA WITH OBSTRUCTION, W/OUT GANGRENE: ICD-10-CM

## 2022-03-21 DIAGNOSIS — M25.551 PAIN IN RIGHT HIP: ICD-10-CM

## 2022-03-21 DIAGNOSIS — M17.11 UNILATERAL PRIMARY OSTEOARTHRITIS, RIGHT KNEE: ICD-10-CM

## 2022-03-21 PROCEDURE — 99204 OFFICE O/P NEW MOD 45 MIN: CPT

## 2022-03-21 NOTE — DISCUSSION/SUMMARY
[de-identified] : I feel this patient would greatly benefit from right total hip replacement he does have some degenerative changes in his right knee but his pain is actually coming from his right hip in his hip is severely limited motion.  He has had a cardiac stent but he is doing very well.  Visional hernia repair next week.  He will return in 1 month but in the future approximately 3 to 4 months after his umbilical hernia repair he may consider right total hip replacement.

## 2022-03-21 NOTE — PHYSICAL EXAM
[de-identified] : Constitutional - the patient is of normal build and nutrition.  He does have a history of scoliosis and therefore he is also bent forward and has a curvature in his lower spine.  He also has a history of Parkinson's which appears to be generally well controlled.  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  The patient remains oriented to person, time, and  place.  Mood is normal. Vital signs as recorded.  The patients gait is his right hip.  He also says that he does have an incisional hernia which is going to have repaired next week.  The patient can stand on toes and heels.\par \par The patient has no difficulty with respiration. Respiration at rest is a normal rate. The patient is not short of breath and has not become short of breath with short ambulation. There is no audible wheezing. No coughing.\par \par Skin is normal for the patient's age. There are no abnormal masses or lymph nodes which stand out in the lower extremities.\par \par Spine - deep tendon reflexes are symmetric. Motor and sensory are symmetric.  He does have Parkinson's.\par \par \par UPPER EXTREMITIES \par \par Shoulders ROM  is symmetric  and the motion is satisfactory.  There is no significant shoulder pain or limitation in motion which would make using a cane or a walker difficult. Shoulder stability and  strength are satisfactory.\par \par There is normal motion in the wrists and elbows.\par \par Circulation appears satisfactory with pedal pulses present.  There is no major edema in the lower legs. No skin tenderness or increased temperature. No major varicosities.\par \par HIP EXAMINATION the abduction and abduction as well as rotation measurements were taken with the hip in flexion.\par \par Motion\par The motion in his right hip is significantly limited he has flexion right hip 95 degrees left hip 130 degrees, abduction right hip 30 degrees left hip 65 degrees, adduction right hip 0 degrees left hip 25 degrees, external rotation right hip 20 degrees left hip 70 degrees, internal rotation right hip 0 degrees left hip 10 degrees.  He has pain with motion in his right hip.  Guards the strength in his right hip.\par \par  There is no crepitus in either hip. The alignment of the hips is normal.\par \par \par KNEE EXAMINATION\par \par Motion\par Right Knee has 5 to 125 degrees of motion with good medial  lateral and anterior posterior stability.  There is no major effusion.  There is no Baker's cyst.  There is some significant patellofemoral crepitus.  Possibly because of his hip he does have some pain in the quadriceps radiating to the anterior knee.\par Left  Knee   has 0 to 135 degrees of motion with good medial lateral and anterior posterior stability.  There is no major effusion there is no Baker's cyst.  There is no significant patellofemoral crepitus.  The patient has satisfactory strength across the knee.            \par \par Ankle and foot examination\par Of the ankle has normal motion.  There is normal ankle stability.  The patient has no major abnormalities of the foot.\par \par \par \par  [de-identified] : X-rays are available from Dr. Albert Jean's office which do show bone against bone contact throughout his right hip with an oblong femoral head and severe osteoarthritis.  His left femoral head is round and joint space maintained.  There is a curvature in his lumbar spine with a convexity to the right\par \par X-rays of his right knee were obtained also from Dr. Lyles's office and these do show some thinning of his patellofemoral cartilage and degeneration mild degenerative changes throughout his knee with some squaring of the lateral compartment and possible narrowing of the lateral compartment.  Impression degenerative arthritis.

## 2022-03-21 NOTE — CONSULT LETTER
[FreeTextEntry1] : Dear Dr. Curtis\par \par I have seen your patient in my office today.  Continue a copy of my orthopedic consult for your records.  Thank you, \par \par Dr Vince Trujillo    \[facility]   [phone]

## 2022-03-21 NOTE — HISTORY OF PRESENT ILLNESS
[de-identified] : Mr. ADRIAN ECHEVARRIA is a 76 year male who presents to office complaining of right hip and knee pain x 6 months with insidious onset.\par Denies specific injury, trauma, or fall to his hip or knee.\par Describes pain as a constant dull/achy pain in his groin radiating down his anterior thigh to his right knee, settling in the medial aspect.\par Pain is aggravated with getting up from a seated position and with prolonged ambulation.\par Patient mentions trying PT, cortisone injections x 2 (in September and December 2021 to his right hip and knee, which didn't help much).\par PMHx of Parkinson's Disease and coronary artery disease for which he takes Carbidopa-Levodopa and Plavix, respectively.\par Therefore, he only takes Tylenol for pain, which doesn't seem to help.\par He uses a cane normally to help with balance and ambulation.\par All review of systems, family history, social history, surgical history, past medical history, medications, and allergies not previously stated as positive are negative. They were reviewed by me today with the patient and documented accordingly.

## 2022-03-24 NOTE — HISTORY OF PRESENT ILLNESS
[FreeTextEntry8] : Patient was in [place], and swimming in a condo pool Tue/Wed/Thu (3/15/22 - 3/17/22) of this past week, and on Wed 3/16/22 he felt hard of hearing, water on the ear, and some dizziness.  He hears ringing sounds.  He has no fevers, chills, chest pain, dyspnea, nausea or vomiting.  He does not have much vertigo.  He did not take any OTC medications.  Patient came home last night.  Patient notes that he is having hernia surgery 3/29/2022.  Patient notes that 2 years ago he had similar symptoms, and it was a cerumen impaction.\par \par Patient got 3 doses of the Moderna COVID-19 vaccine .  He got the influenza vaccination.

## 2022-03-24 NOTE — PHYSICAL EXAM
[No Edema] : there was no peripheral edema [Normal] : soft, non-tender, non-distended, no masses palpated, no HSM and normal bowel sounds [de-identified] : bilateral cerumen impaction

## 2022-03-24 NOTE — ASSESSMENT
[FreeTextEntry1] : Patient with bilateral cerumen impaction, symptomatic.  Patient given an ENT referral.  He was advised not to place objects in the ears.  He can use Debrox, although the results are limited when the wax is impacted.

## 2022-05-02 ENCOUNTER — APPOINTMENT (OUTPATIENT)
Dept: ORTHOPEDIC SURGERY | Facility: CLINIC | Age: 77
End: 2022-05-02

## 2022-05-13 ENCOUNTER — NON-APPOINTMENT (OUTPATIENT)
Age: 77
End: 2022-05-13

## 2022-05-13 ENCOUNTER — APPOINTMENT (OUTPATIENT)
Dept: CARDIOLOGY | Facility: CLINIC | Age: 77
End: 2022-05-13
Payer: MEDICARE

## 2022-05-13 VITALS
HEIGHT: 69.5 IN | DIASTOLIC BLOOD PRESSURE: 71 MMHG | WEIGHT: 165 LBS | BODY MASS INDEX: 23.89 KG/M2 | HEART RATE: 52 BPM | SYSTOLIC BLOOD PRESSURE: 114 MMHG | OXYGEN SATURATION: 100 %

## 2022-05-13 DIAGNOSIS — I49.5 SICK SINUS SYNDROME: ICD-10-CM

## 2022-05-13 PROCEDURE — 36415 COLL VENOUS BLD VENIPUNCTURE: CPT

## 2022-05-13 PROCEDURE — 93000 ELECTROCARDIOGRAM COMPLETE: CPT

## 2022-05-13 PROCEDURE — 99214 OFFICE O/P EST MOD 30 MIN: CPT

## 2022-05-16 LAB
ALBUMIN SERPL ELPH-MCNC: 4.4 G/DL
ALP BLD-CCNC: 100 U/L
ALT SERPL-CCNC: 11 U/L
ANION GAP SERPL CALC-SCNC: 14 MMOL/L
AST SERPL-CCNC: 16 U/L
BASOPHILS # BLD AUTO: 0.07 K/UL
BASOPHILS NFR BLD AUTO: 1 %
BILIRUB SERPL-MCNC: 0.3 MG/DL
BUN SERPL-MCNC: 17 MG/DL
CALCIUM SERPL-MCNC: 9.4 MG/DL
CHLORIDE SERPL-SCNC: 97 MMOL/L
CO2 SERPL-SCNC: 22 MMOL/L
CREAT SERPL-MCNC: 0.88 MG/DL
EGFR: 90 ML/MIN/1.73M2
EOSINOPHIL # BLD AUTO: 0.81 K/UL
EOSINOPHIL NFR BLD AUTO: 12.1 %
GLUCOSE SERPL-MCNC: 109 MG/DL
HCT VFR BLD CALC: 39.4 %
HGB BLD-MCNC: 13.6 G/DL
IMM GRANULOCYTES NFR BLD AUTO: 0.4 %
LYMPHOCYTES # BLD AUTO: 1.15 K/UL
LYMPHOCYTES NFR BLD AUTO: 17.2 %
MAN DIFF?: NORMAL
MCHC RBC-ENTMCNC: 30.3 PG
MCHC RBC-ENTMCNC: 34.5 GM/DL
MCV RBC AUTO: 87.8 FL
MONOCYTES # BLD AUTO: 0.58 K/UL
MONOCYTES NFR BLD AUTO: 8.7 %
NEUTROPHILS # BLD AUTO: 4.03 K/UL
NEUTROPHILS NFR BLD AUTO: 60.6 %
NT-PROBNP SERPL-MCNC: 160 PG/ML
PLATELET # BLD AUTO: 302 K/UL
POTASSIUM SERPL-SCNC: 4.5 MMOL/L
PROT SERPL-MCNC: 6.9 G/DL
RBC # BLD: 4.49 M/UL
RBC # FLD: 12.5 %
SODIUM SERPL-SCNC: 133 MMOL/L
TSH SERPL-ACNC: 1.75 UIU/ML
WBC # FLD AUTO: 6.67 K/UL

## 2022-05-16 NOTE — REASON FOR VISIT
[FreeTextEntry1] : The patient is a 76-year-old male with a history of angina with nonobstructive coronary artery disease,  and sinus node dysfunction and iron deficiency anemia here because of chest pain. Now here s/p stent to LAD. Now s/p hernia surgery

## 2022-05-16 NOTE — REVIEW OF SYSTEMS
[Depression] : depression [Anxiety] : anxiety [Negative] : Heme/Lymph [Weight Gain (___ Lbs)] : no recent weight gain [Feeling Fatigued] : not feeling fatigued [Weight Loss (___ Lbs)] : no recent weight loss [Dyspnea on exertion] : not dyspnea during exertion [Chest Discomfort] : no chest discomfort [Lower Ext Edema] : no extremity edema [Palpitations] : no palpitations [Syncope] : no syncope [Abdominal Pain] : no abdominal pain [Suicidal] : not suicidal [FreeTextEntry5] : see HPI [FreeTextEntry7] : Abdominal hernia [FreeTextEntry9] : Needs hip replacement [de-identified] : Parkinsonism and some orthostasis [de-identified] : Seems a little depressed and anxious

## 2022-05-16 NOTE — HISTORY OF PRESENT ILLNESS
[FreeTextEntry1] : In 2012 the patient presented with exertional chest burning. Abnormal stress echocardiogram. Cardiac catheterization revealed nonobstructive coronary disease (only 40% distal LAD). He been maintained on Ranexa with good relief of his symptoms. \par December 6, 2016. The last couple weeks the patient has been getting his chest tightness, primarily at rest when he stressed. He's had to use nitroglycerin at least once a week. He's had no chest discomfort when he walks on the treadmill or exercise in the gym. Patient also feels slightly lightheaded once in a great while. He usually is sitting when it occurs. He does not have it when he is walking or standing.\par Stress echo on December 16 was negative and the echo again showed mild to moderate MR, unchanged.\par December 4, 2017. One year since last visit. Symptoms, maybe once a week, and usually related to emotional upset and stress. Responds to nitroglycerin within 5 minutes. He never increased his Ranexa. He was complaining about the cost of Ranexa and we discussed the rationale for Ranexa versus other medications in his case. Beta blockers must be avoided given his sinus bradycardia. He had labs with Dr. Rossi on September 28. He remains with iron deficiency anemia, but he had a negative GI workup in 2014. Since then he has increased his iron from once a day to twice a day. His lipids had a cholesterol of 147, HDL 70, LDL 63, triglycerides 68, and he remains on atorvastatin. His hemoglobin A1c was 5.5, and the rest of his labs were unremarkable. He had a flu shot with Dr. Rossi.\par April 24, 2018. Patient has recently been noticing that he is getting more short of breath with exertion with some chest pressure. Can barely walk a block or 2. 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 The orthopedist wants to give him a synthetic cartilage injection and he is seeing a vascular doctor later this week because of the swelling in his legs, which is a little more on the right side, where his knee is acting up. He said he walks and feels like an old man. EKG is sinus bradycardia and unchanged. Found to have severe iron deficiency anemia and referred to GI.\par June 7, 2018. The patient returns in followup. Had colonoscopy and endoscopy that were mostly negative. Still considering capsule study. In the meantime, he is on iron twice a day, and his levels have come up. He had been transfused 3 units at the beginning. He still has some orthostatic dizziness at times, but otherwise most of his symptoms are gone. He is getting ready for a trip to [facility]. His EKG remains with sinus bradycardia, and some nonspecific ST changes, but unchanged.\par October 9, 2018. Patient was at hematology on October 5, and a heart rate of 42, was recorded. I was told he had a low heart rate and needed an appointment and he was scheduled for today. Now the patient says when he called he told them he was dizzy and having symptoms, which was not related to me. Here, he is in sinus bradycardia at 46, with mild ST depressions V4 through 6 as well as lead II and aVF. His dizziness is mostly when he changes positions that'll last for a few seconds, and then passes. He is not limited in his exercise capacity and does not quite get near syncopal. No other complaints. Able to walk 20 minutes the other day without stopping.\par November 21, 2019.  First visit in over a year.  Slightly depressed over diagnosis of Parkinson's but so far mild case and doing well.  On Azilect 0.5 twice daily and resagiline 1 mg daily.  Heart rate still slow at 41 but no syncope or near syncope.  Occasionally feels a little dizzy sitting or walking but does not sound like near syncope.  Changed his atorvastatin to every other day on his own but has not had lipids since October of last year with me.  He will have Dr. Rossi added to the blood test when he sees him in a few weeks.  Hemoglobin and hematocrit and iron levels have been holding steady.  No chest pain or shortness of breath.  Considering some experimental options for the Parkinson's going forward.\par December 9, 2019.  Patient called and wanted to come in.  Saw Dr. Sohail Arana of neurology who told him he thinks he needs a pacemaker and that his low heart rate could be explaining some of his Parkinson's symptoms.  I put in a call to Dr. Arana and I am awaiting a response.  In the meantime reviewed the symptoms and the procedure with the patient.  At times he says he does have some lightheadedness and dizziness and wants the pacemaker again at other times is too nervous does not want to stay in hospital overnight and does not think he is having any symptoms different than the last few years.  Certainly no syncope and no definite near syncope.  Heart rate here was 48.  Blood pressure okay.  No recent change in medications.\par July 7, 2020.  Patient here in follow-up.  Remains in sinus bradycardia at 47 with an otherwise normal ECG except incomplete right bundle branch block.  Recent labs with cholesterol 209, HDL 68, .  Normal thyroid function.  Last week he felt dizzy for most of the week and most of the days on and off  somewhat lightheaded but not near syncopal, not vertigo.  This week however he feels perfectly back to himself so not that likely to be from conduction disease.  No other complaints.  He remains on Lipitor and Ranexa.\par August 9, 2021.  Patient here for preop cardiac evaluation due to an abnormal ECG.  Last here over a year ago.  Has been doing well for the most part but has developed mild Parkinson's.  Not having any issues with angina currently but upon pushing the history he does have some orthostatic dizziness now and then.  Even to the point now where he knows to stop and wait a few minutes when he gets out of the car etc. (Actually I had mentioned this in my note over a year ago and that his symptoms were probably orthostasis related to his Parkinson's.)  Here in fact there is a drop in his blood pressure from lying to sitting and is probably related to his parkinsonism.  Otherwise no symptoms or signs of heart failure unstable angina etc.  He always has a mildly abnormal ECG with some ST depressions in the inferior leads and V4 through 6 and the EKG done preop at [place] was not significantly changed from his previous EKGs of over a year ago.  He will be having surgery on both an inguinal and an abdominal hernia by Dr. Bo.\par September 10, 2021.  Patient here for urgent visit.  Called yesterday with a burning chest pain and I was unavailable and was told to speak with or see his internist Dr. Rossi.  He saw Dr. Rossi this morning and EKG was totally unchanged with his usual sinus bradycardia and minimal ST depressions.  He is now here. His surgery and the recovery was a little more difficult. And now for the last week or so he has been having his usual exertional chest burning relieved with rest. It does not seem to be progressive. No signs that he is anemic again but he is more fatigued since the surgery. He also talked about his mild orthostasis which again is more likely related to the Parkinson's. After long discussion we decided that if his troponin is positive but he is stable we will schedule him for an angiogram next week, and if troponin is negative we will try to double up on the Ranexa and consider a stress test or a CT angio of his coronaries.\par September 13, 2021. Spoke with patient.  Troponin negative.  Excellent lipid profile, normal hemoglobin A1c, normal BNP, sodium 131, hematocrit 37.  Patient actually feeling much better and would like to wait until the stress test scheduled at the beginning of October.  Only complaint is vague lightheadedness at times. \par October 6, 2021.  Patient returned for stress echo and follow-up.  He has been taking the Ranexa 1000 twice daily 1 day and 500 twice daily the next alternating for some reason of his own.  Has not really noticed any change in his symptoms.  On the stress test he got his symptoms at 4-1/2 minutes of a low level protocol with his heart rate 80 and there were ST depressions mostly in II and aVF but also in V3 through V6 that persisted in recovery.  He was able to do 10 minutes of the low level protocol to a heart rate of 111.  He got his usual chest burning that increased with exercise and took about 8 or 9 minutes of recovery to go away.  The echocardiogram post exercise was actually normal without wall motion abnormalities.  The resting echo itself had normal LV and RV function with mild to moderate MR and mild TR and was really unchanged from 2016.  After long discussion with patient and his wife we elected to schedule coronary angiography.\par October 13, 2021.Patient had cath yesterday.  70% stenosis in the mid LAD confirmed as significant with a positive IFR.  Stent was placed.  He should remain on dual antiplatelet therapy for 3 months.  I spoke with Dr. Man yesterday and I left a message for the patient this evening.  He should make follow-up appointment with me.\par October 29, 2021.  Patient returns in follow-up after his LAD stent as noted above.  EKG here is sinus at 45 with his usual mild ST depressions in leads II, V2 through 5. Feels wonderful since the stent but is also a little anxious ever since realizing that he needs a stent.  Still on Ranexa along with his statin and dual antiplatelet therapy and his Parkinson's medication.  Blood pressure running a little high today probably just from the stress.  Labs will be checked.\par December 17, 2021.  Patient here in follow-up.  Doing well from a cardiac point of view.  Another abdominal hernia popped up but he may need surgery again but I told him to ideally wait 6 months from the time of his stent.  He also needs injections in his knee and possibly hip and may need surgery in the future.  Good spirits with no other symptoms or complaints.  Still on dual antiplatelet therapy.  May be going away in January but a lot of stress with his mother-in-law and her Alzheimer's etc.\par February 14, 2022.  Semiurgent visit as patient just found out he is going to need hip replacement surgery.  Will probably be doing it with Dr. Nick Mackenzie at [facility].  Also discussed his hernia surgery with Dr. Bo to and most likely he will have the hernia surgery first.  For now he is willing to wait a little longer and stay on dual antiplatelet therapy.  No chest pain no shortness of breath no other new issues.  Reviewed the pros and cons of waiting 6 months versus waiting a little last in order to have the surgery.\par March 11, 2022.  Due to his abdominal discomfort he finally scheduled the hernia surgery from March 29.  He has remained stable from a cardiac point of view.  Still on dual antiplatelet therapy.  No chest pain, shortness of breath, melena etc.  Baseline EKG still abnormal with some resting ST depressions but unchanged from previous tracings.  He thinks he will need hip replacement surgery pretty soon after that as well.\par May 13, 2022.  Patient returns after his hernia surgery of March 29.  He remains in sinus bradycardia at 48 and the rest of his EKG is unchanged.  Had a rough time the first few weeks postop because of severe pain but now is doing well.

## 2022-05-16 NOTE — PHYSICAL EXAM
[General Appearance - Well Developed] : well developed [Normal Appearance] : normal appearance [Well Groomed] : well groomed [General Appearance - Well Nourished] : well nourished [No Deformities] : no deformities [General Appearance - In No Acute Distress] : no acute distress [Heart Rate And Rhythm] : heart rate and rhythm were normal [Heart Sounds] : normal S1 and S2 [Murmurs] : no murmurs present [Bradycardic ___] : the heart rate was bradycardic at [unfilled] bpm [Regular-Premature Beats] : the rhythm was regular with premature beats [Respiration, Rhythm And Depth] : normal respiratory rhythm and effort [Exaggerated Use Of Accessory Muscles For Inspiration] : no accessory muscle use [Auscultation Breath Sounds / Voice Sounds] : lungs were clear to auscultation bilaterally [Abdomen Soft] : soft [Abdomen Tenderness] : non-tender [Abdomen Mass (___ Cm)] : no abdominal mass palpated [Nail Clubbing] : no clubbing of the fingernails [Cyanosis, Localized] : no localized cyanosis [Petechial Hemorrhages (___cm)] : no petechial hemorrhages [Normal Conjunctiva] : the conjunctiva exhibited no abnormalities [Eyelids - No Xanthelasma] : the eyelids demonstrated no xanthelasmas [Normal Oral Mucosa] : normal oral mucosa [No Oral Pallor] : no oral pallor [No Oral Cyanosis] : no oral cyanosis [Normal Jugular Venous A Waves Present] : normal jugular venous A waves present [Normal Jugular Venous V Waves Present] : normal jugular venous V waves present [No Jugular Venous Wasserman A Waves] : no jugular venous wasserman A waves [Abnormal Walk] : normal gait [Gait - Sufficient For Exercise Testing] : the gait was sufficient for exercise testing [Skin Color & Pigmentation] : normal skin color and pigmentation [] : no rash [No Venous Stasis] : no venous stasis [Skin Lesions] : no skin lesions [No Skin Ulcers] : no skin ulcer [No Xanthoma] : no  xanthoma was observed [Oriented To Time, Place, And Person] : oriented to person, place, and time [Affect] : the affect was normal [Mood] : the mood was normal [FreeTextEntry1] : Seems less depressed, and a little anxious.

## 2022-05-21 ENCOUNTER — TRANSCRIPTION ENCOUNTER (OUTPATIENT)
Age: 77
End: 2022-05-21

## 2022-05-21 ENCOUNTER — NON-APPOINTMENT (OUTPATIENT)
Age: 77
End: 2022-05-21

## 2022-05-23 ENCOUNTER — NON-APPOINTMENT (OUTPATIENT)
Age: 77
End: 2022-05-23

## 2022-05-24 ENCOUNTER — APPOINTMENT (OUTPATIENT)
Dept: DISASTER EMERGENCY | Facility: HOSPITAL | Age: 77
End: 2022-05-24

## 2022-05-24 ENCOUNTER — OUTPATIENT (OUTPATIENT)
Dept: OUTPATIENT SERVICES | Facility: HOSPITAL | Age: 77
LOS: 1 days | End: 2022-05-24

## 2022-05-24 VITALS
RESPIRATION RATE: 17 BRPM | HEART RATE: 55 BPM | SYSTOLIC BLOOD PRESSURE: 126 MMHG | DIASTOLIC BLOOD PRESSURE: 78 MMHG | TEMPERATURE: 98 F | OXYGEN SATURATION: 100 %

## 2022-05-24 VITALS
HEART RATE: 53 BPM | WEIGHT: 162.04 LBS | SYSTOLIC BLOOD PRESSURE: 122 MMHG | OXYGEN SATURATION: 99 % | HEIGHT: 69 IN | TEMPERATURE: 98 F | DIASTOLIC BLOOD PRESSURE: 78 MMHG | RESPIRATION RATE: 17 BRPM

## 2022-05-24 DIAGNOSIS — Z98.890 OTHER SPECIFIED POSTPROCEDURAL STATES: Chronic | ICD-10-CM

## 2022-05-24 DIAGNOSIS — Z87.09 PERSONAL HISTORY OF OTHER DISEASES OF THE RESPIRATORY SYSTEM: Chronic | ICD-10-CM

## 2022-05-24 DIAGNOSIS — U07.1 COVID-19: ICD-10-CM

## 2022-05-24 RX ORDER — BEBTELOVIMAB 87.5 MG/ML
175 INJECTION, SOLUTION INTRAVENOUS ONCE
Refills: 0 | Status: COMPLETED | OUTPATIENT
Start: 2022-05-24 | End: 2022-05-24

## 2022-05-24 RX ADMIN — BEBTELOVIMAB 175 MILLIGRAM(S): 87.5 INJECTION, SOLUTION INTRAVENOUS at 14:52

## 2022-05-24 NOTE — CHART NOTE - NSCHARTNOTEFT_GEN_A_CORE
CC: Monoclonal Antibody Administration/COVID 19 Positive      History: Patient presents for administration of monoclonal antibody  Patient has been screened and was deemed to be a candidate.    Exposure: Attended  in [facility] last week    Symptoms/ Criteria: cough chills sore throat headache myalgia    Inclusion Criteria: age 76 years  Parkinsons  CAD    Date of Positive Test: verified by clinical call center     Date of symptom onset: 5/20      Vaccine status: Moderna x 3  last 12/21    PMHx:  Family history of early CAD    FH: CAD (coronary artery disease) (Father)    Infection due to severe acute respiratory syndrome coronavirus 2 (SARS-CoV-2)    No pertinent past medical history    High cholesterol    Bradycardia    Parkinsons disease    CAD (coronary artery disease)    Chronic stable angina    H/O sinusitis    S/P shoulder surgery    History of repair of hiatal hernia    H/O inguinal hernia repair    History of left heart catheterization          T(C): 36.7 (05-24-22 @ 15:09), Max: 36.7 (05-24-22 @ 15:09)  HR: 55 (05-24-22 @ 15:09) (53 - 55)  BP: 119/75 (05-24-22 @ 15:09) (119/75 - 122/78)  RR: 17 (05-24-22 @ 15:09) (17 - 17)  SpO2: 99% (05-24-22 @ 15:09) (99% - 99%)      PE:   Appearance: NAD	  HEENT:   Normal oral mucosa.   Lymphatic: No lymphadenopathy  Cardiovascular: Normal S1 S2, No JVD, No murmurs, No edema  Respiratory: Lungs clear to auscultation	  Gastrointestinal:  Soft, Non-tender. No guarding   Skin: warm and dry  Neurologic: Non-focal  Extremities: Normal range of motion.     ASSESSMENT:  Pt is a 76y year old Male with PMH Parkinsons  CAD   Covid +  referred to the infusion center for Monoclonal antibody administration  (Bebtelovimab).        PLAN:  - Administration procedure explained to patient   - Consent for monoclonal antibody administration obtained   - Risk & benefits discussed/all questions answered  - Administer Bebtelovimab per protocol  - will observe patient for one hour post administration  and then if stable discharge home with outpt follow up as planned by PMD.        - Patient tolerated treatment well denies complaints of chest pain/SOB/dizziness/ palpitations  - VSS for discharge home  - D/C instructions given/ fact sheet included.  - Patient to follow-up with PCP as needed.

## 2022-06-10 NOTE — DATA REVIEWED
[facility] [address]  [address], [address]  [place], [facility]  Telephone: [phone]      Fax: [phone] home care company: Tammy Mcmillan [phone]    Your wound-care supplies will be provided by:  N/a . NAME:  Mono Cabrera   YOB: 1934  PRIMARY DIAGNOSIS FOR WOUND CARE CENTER: Diabetic Foot Ulcer. Wound cleansing:   Do not scrub or use excessive force. Wash hands with soap and water before and after dressing changes. Prior to applying a clean dressing, cleanse wound with normal saline, wound cleanser, or mild soap and water. Ask your physician or nurse before getting the wound(s) wet in the shower. HOME CARE ORDERS    Wound care for home:      RIGHT PLANTAR FOOT  [place] applied per Dr. Rock Villalobos- do not remove     Betadine ointment - can purchase in out pt pharmacy today  Gauze  jason  Change daily  Medium spandigrip - apply in am and remove pm    Dr. Oriana Dowell    Please provide darco shoe with peg asst for offloading-Do NOT drive with this shoe in place!!      Please note, all wounds (unless stated otherwise here) were mechanically debrided at the time of cleansing here in the wound-care center today, so a small amount of pain, drainage or bleeding from that process might be expected, and is normal.     All products for home use, including multiple products for a single wound if applicable, are medically necessary in order to achieve the best chance at timely wound healing. See provider documentation for details if needed. Substituted dressings applied in the [facility] today, if applicable:    N/A    New orders for this week (labs, imaging, medications, etc.):     wound culture today  Please Schedule MRI   See above wound care orders  Do not get foot wet - can purchase cast guard in out patient pharmacy    You have been prescribed Doxycline today.   It is important [FreeTextEntry1] : EKG:  SB 45, no acute changes. that you begin this medication and take as directed. If this is an antibiotic, please take all tablets or capsules until gone. Your Pharmacy of record is:___[facility] [id number]_  This medication order has been: ___x__ electronically sent    Or   _____Printed for your convenience. Additional instructions for specific diagnoses:    General comments for diabetic / neuropathic ulcers:  *  Unless you've been instructed not to remove your dressings, be sure to inspect your feet daily, and notify us of any major changes. *  If you do not have a long-term podiatrist, be sure to let us know. *  Moisturize your skin regularly with Vaseline, Aquaphor, Aveeno, CeraVe, Cetaphil, Eucerin, Lubriderm, etc; but keep the skin between your toes dry. *  Always allow your wound-care doctor or podiatrist to cut nails, calluses & corns. *  Be sure to always wear footwear that fits well, and NEVER go without shoes and socks. *  Do you know your Hemoglobin A1c level? You should! Please ask if you have questions. *  Be sure to adhere to any recommendations from your PCP or endocrinologist when it comes to diabetes medications and diet. If you have questions, please ask. *  If you smoke, your wound can not heal properly -- please talk with us when you're ready to quit. *  Do not soak your feet unless specifically instructed to do so by us. F/U Appointment is with Dr Xochitl Garrido in 1 week, on                                   at                       .     Your nurse  is Leela Calixto.

## 2022-06-22 ENCOUNTER — RX RENEWAL (OUTPATIENT)
Age: 77
End: 2022-06-22

## 2022-07-12 ENCOUNTER — OUTPATIENT (OUTPATIENT)
Dept: OUTPATIENT SERVICES | Facility: HOSPITAL | Age: 77
LOS: 1 days | Discharge: HOME | End: 2022-07-12

## 2022-07-12 DIAGNOSIS — Z98.890 OTHER SPECIFIED POSTPROCEDURAL STATES: Chronic | ICD-10-CM

## 2022-07-12 DIAGNOSIS — Z87.09 PERSONAL HISTORY OF OTHER DISEASES OF THE RESPIRATORY SYSTEM: Chronic | ICD-10-CM

## 2022-07-12 DIAGNOSIS — D50.9 IRON DEFICIENCY ANEMIA, UNSPECIFIED: ICD-10-CM

## 2022-07-13 ENCOUNTER — NON-APPOINTMENT (OUTPATIENT)
Age: 77
End: 2022-07-13

## 2022-07-13 ENCOUNTER — APPOINTMENT (OUTPATIENT)
Dept: HEMATOLOGY ONCOLOGY | Facility: CLINIC | Age: 77
End: 2022-07-13

## 2022-07-13 ENCOUNTER — APPOINTMENT (OUTPATIENT)
Dept: CARDIOLOGY | Facility: CLINIC | Age: 77
End: 2022-07-13

## 2022-07-13 VITALS — HEART RATE: 44 BPM | SYSTOLIC BLOOD PRESSURE: 120 MMHG | DIASTOLIC BLOOD PRESSURE: 80 MMHG

## 2022-07-13 VITALS
DIASTOLIC BLOOD PRESSURE: 81 MMHG | HEART RATE: 44 BPM | BODY MASS INDEX: 23.89 KG/M2 | OXYGEN SATURATION: 100 % | SYSTOLIC BLOOD PRESSURE: 127 MMHG | WEIGHT: 165 LBS | HEIGHT: 69.5 IN

## 2022-07-13 VITALS — SYSTOLIC BLOOD PRESSURE: 124 MMHG | DIASTOLIC BLOOD PRESSURE: 74 MMHG | HEART RATE: 44 BPM

## 2022-07-13 VITALS — DIASTOLIC BLOOD PRESSURE: 70 MMHG | HEART RATE: 44 BPM | SYSTOLIC BLOOD PRESSURE: 122 MMHG

## 2022-07-13 DIAGNOSIS — R06.02 SHORTNESS OF BREATH: ICD-10-CM

## 2022-07-13 PROCEDURE — 99215 OFFICE O/P EST HI 40 MIN: CPT

## 2022-07-13 PROCEDURE — 36415 COLL VENOUS BLD VENIPUNCTURE: CPT

## 2022-07-13 PROCEDURE — 93000 ELECTROCARDIOGRAM COMPLETE: CPT

## 2022-07-18 LAB
ALBUMIN MFR SERPL ELPH: 60.2 %
ALBUMIN SERPL ELPH-MCNC: 4.4 G/DL
ALBUMIN SERPL-MCNC: 4 G/DL
ALBUMIN/GLOB SERPL: 1.5 RATIO
ALP BLD-CCNC: 81 U/L
ALPHA1 GLOB MFR SERPL ELPH: 3.9 %
ALPHA1 GLOB SERPL ELPH-MCNC: 0.3 G/DL
ALPHA2 GLOB MFR SERPL ELPH: 9.6 %
ALPHA2 GLOB SERPL ELPH-MCNC: 0.6 G/DL
ALT SERPL-CCNC: 8 U/L
ANION GAP SERPL CALC-SCNC: 11 MMOL/L
AST SERPL-CCNC: 14 U/L
B-GLOBULIN MFR SERPL ELPH: 11.6 %
B-GLOBULIN SERPL ELPH-MCNC: 0.8 G/DL
BILIRUB SERPL-MCNC: 0.6 MG/DL
BUN SERPL-MCNC: 18 MG/DL
CALCIUM SERPL-MCNC: 9.3 MG/DL
CHLORIDE SERPL-SCNC: 99 MMOL/L
CHOLEST SERPL-MCNC: 218 MG/DL
CO2 SERPL-SCNC: 24 MMOL/L
CREAT SERPL-MCNC: 0.93 MG/DL
DEPRECATED KAPPA LC FREE/LAMBDA SER: 2.82 RATIO
EGFR: 86 ML/MIN/1.73M2
ERYTHROCYTE [SEDIMENTATION RATE] IN BLOOD BY WESTERGREN METHOD: 13 MM/HR
ESTIMATED AVERAGE GLUCOSE: 126 MG/DL
FERRITIN SERPL-MCNC: 74 NG/ML
GAMMA GLOB FLD ELPH-MCNC: 1 G/DL
GAMMA GLOB MFR SERPL ELPH: 14.7 %
GLUCOSE SERPL-MCNC: 90 MG/DL
HBA1C MFR BLD HPLC: 6 %
HDLC SERPL-MCNC: 70 MG/DL
INTERPRETATION SERPL IEP-IMP: NORMAL
IRON SATN MFR SERPL: 30 %
IRON SERPL-MCNC: 96 UG/DL
KAPPA LC CSF-MCNC: 1.46 MG/DL
KAPPA LC SERPL-MCNC: 4.12 MG/DL
LDLC SERPL CALC-MCNC: 116 MG/DL
NONHDLC SERPL-MCNC: 148 MG/DL
NT-PROBNP SERPL-MCNC: 120 PG/ML
POTASSIUM SERPL-SCNC: 4.3 MMOL/L
PROT SERPL-MCNC: 6.7 G/DL
SODIUM SERPL-SCNC: 134 MMOL/L
TIBC SERPL-MCNC: 317 UG/DL
TRIGL SERPL-MCNC: 156 MG/DL
TSH SERPL-ACNC: 1.13 UIU/ML
UIBC SERPL-MCNC: 221 UG/DL

## 2022-08-16 NOTE — HISTORY OF PRESENT ILLNESS
[Preoperative Visit] : for a medical evaluation prior to surgery [Scheduled Procedure ___] : a [unfilled] [Date of Surgery ___] : on [unfilled] [Cardiovascular Disease] : cardiovascular disease [Anti-Platelet Agents] : anti-platelet agents [Frequent Aspirin Use] : frequent aspirin use [Prior Anesthesia] : Prior anesthesia [Electrocardiogram] : ~T an ECG ~C was performed [Echocardiogram] : ~T an echocardiogram ~C was performed [Cardiac Catheterization  (Diagnostic)] : cardiac catheterization ~T ~C was performed [Cardiovascular Stress Test] : a cardiac stress test ~T ~C was performed [Good] : Good [Surgeon Name ___] : surgeon: [unfilled] [Fever] : no fever [Chills] : no chills [Fatigue] : no fatigue [Chest Pain] : no chest pain [Cough] : no cough [Dyspnea] : no dyspnea [Dysuria] : no dysuria [Urinary Frequency] : no urinary frequency [Nausea] : no nausea [Vomiting] : no vomiting [Diarrhea] : no diarrhea [Abdominal Pain] : no abdominal pain [Easy Bruising] : no easy bruising [Lower Extremity Swelling] : no lower extremity swelling [Poor Exercise Tolerance] : no poor exercise tolerance [Diabetes] : no diabetes [Pulmonary Disease] : no pulmonary disease [Nicotine Dependence] : no nicotine dependence [Alcohol Use] : no  alcohol use [Renal Disease] : no renal disease [GI Disease] : no gastrointestinal disease [Sleep Apnea] : no sleep apnea [Thromboembolic Problems] : no thromboembolic problems [Frequent use of NSAIDs] : no use of NSAIDs [Transfusion Reaction] : no transfusion reaction [Impaired Immunity] : no impaired immunity [Steroid Use in Last 6 Months] : no steroid use in the last six months [Prev Anesthesia Reaction] : no previous anesthesia reaction [Anesthesia Reaction] : no anesthesia reaction [Sudden Death] : no sudden death [Clotting Disorder] : no clotting disorder [Bleeding Disorder] : no bleeding disorder [FreeTextEntry1] : hernia repair [FreeTextEntry2] : 8/11/21 [FreeTextEntry3] : Dr. Mancera [FreeTextEntry4] : The patient is here for a medical clearance prior to planned hernia repair.  He has two abdominal wall hernias to be repaired on 8/11/21.\par \par he is active and can walk 0.5-1 mile at a moderate pace without difficulty.  He also swims.  He denies chest pain or dyspnea with exertion.  He denies orthopnea or leg edema.  He hasn't needed to take nitroglycerin in at least 1-2 years.\par \par No new GI or  symptoms.

## 2022-08-16 NOTE — ADDENDUM
[FreeTextEntry1] : Addendum:  August 16, 2022.  Received and reviewed his presurgical labs and ECG.  All are in order.  Once again he should not be considered to be at a significantly increased risk for the upcoming surgery and anesthesia.

## 2022-08-16 NOTE — DISCUSSION/SUMMARY
[Procedure Intermediate Risk] : the procedure risk is intermediate [Patient Intermediate Risk] : the patient is an intermediate risk [As per surgery] : as per surgery [Continue] : Continue medications as currently directed [Optimized for Surgery] : the patient is optimized for surgery [FreeTextEntry3] : Continue all medications including aspirin but okay to hold Plavix prior to surgery. [FreeTextEntry1] : August 9, 2022.  The patient has not had any more issues of dizziness or lightheadedness since his visit on July 13.  He is scheduled for hip replacement on August 17.  The patient had stable angina, no congestive heart failure, and a mildly abnormal but unchanged EKG both in terms of his sinus bradycardia at rest and his mild ST abnormalities.  Now since his LAD stent in October, 2021 he has remained asymptomatic and stable.  He does have some orthostasis probably from his Parkinson's so volume depletion should be avoided.  I do not believe he needs any further cardiac work-up at this time, and based on his exam of July 13 and his subsequent history until today, I feel he should not be considered to be a high risk for the upcoming surgery and anesthesia.

## 2022-08-16 NOTE — REVIEW OF SYSTEMS
[Feeling Fatigued] : feeling fatigued [Dizziness] : dizziness [Depression] : depression [Anxiety] : anxiety [Negative] : Heme/Lymph [Weight Gain (___ Lbs)] : no recent weight gain [Weight Loss (___ Lbs)] : no recent weight loss [Dyspnea on exertion] : not dyspnea during exertion [Chest Discomfort] : no chest discomfort [Lower Ext Edema] : no extremity edema [Palpitations] : no palpitations [Syncope] : no syncope [Abdominal Pain] : no abdominal pain [Suicidal] : not suicidal [FreeTextEntry5] : see HPI [FreeTextEntry7] : Abdominal hernia [FreeTextEntry9] : Needs hip replacement [de-identified] : Parkinsonism and some orthostasis [de-identified] : Seems a little depressed and anxious

## 2022-08-16 NOTE — REVIEW OF SYSTEMS
[Feeling Fatigued] : feeling fatigued [Dizziness] : dizziness [Depression] : depression [Anxiety] : anxiety [Negative] : Heme/Lymph [Weight Gain (___ Lbs)] : no recent weight gain [Weight Loss (___ Lbs)] : no recent weight loss [Dyspnea on exertion] : not dyspnea during exertion [Chest Discomfort] : no chest discomfort [Lower Ext Edema] : no extremity edema [Palpitations] : no palpitations [Syncope] : no syncope [Abdominal Pain] : no abdominal pain [Suicidal] : not suicidal [FreeTextEntry5] : see HPI [FreeTextEntry7] : Abdominal hernia [FreeTextEntry9] : Needs hip replacement [de-identified] : Parkinsonism and some orthostasis [de-identified] : Seems a little depressed and anxious

## 2022-08-16 NOTE — ADDENDUM
Danna is a 29 year old female here for an obstetrics check.  She is      . Gestational age: 28w1d     She reports fetal movement  She denies contractions  She denies bleeding  She reports headaches, resolved   She denies edema  She reports abdominal pain, lower right abdomen and \"pelvic pressure\"   She denies rupture of membranes  She denies vision changes    Tdap discussed with patient.  VIS provided.     Concerns  1. Had an episode of severe nausea/vomiting, 10-15 episodes of vomiting throughout the night.  Felt increased pelvic pressure during and since then, she would like to discuss this.   2. \"intense back aches\"  3. She would also like to discuss irregular contractions      See Prenatal Flowsheet for additional comments.   [FreeTextEntry1] : Addendum:  August 16, 2022.  Received and reviewed his presurgical labs and ECG.  All are in order.  Once again he should not be considered to be at a significantly increased risk for the upcoming surgery and anesthesia.

## 2022-08-24 ENCOUNTER — APPOINTMENT (OUTPATIENT)
Dept: INTERNAL MEDICINE | Facility: CLINIC | Age: 77
End: 2022-08-24

## 2022-08-24 VITALS
HEART RATE: 73 BPM | DIASTOLIC BLOOD PRESSURE: 70 MMHG | TEMPERATURE: 97.5 F | SYSTOLIC BLOOD PRESSURE: 90 MMHG | OXYGEN SATURATION: 98 %

## 2022-08-24 DIAGNOSIS — R06.6 HICCOUGH: ICD-10-CM

## 2022-08-24 PROCEDURE — 99214 OFFICE O/P EST MOD 30 MIN: CPT | Mod: 25

## 2022-08-24 RX ORDER — FLUTICASONE PROPIONATE 220 UG/1
220 AEROSOL, METERED RESPIRATORY (INHALATION)
Qty: 1 | Refills: 5 | Status: DISCONTINUED | COMMUNITY
Start: 2021-12-08 | End: 2022-08-24

## 2022-08-24 NOTE — HISTORY OF PRESENT ILLNESS
[Formal Caregiver] : formal caregiver [Family Member] : family member [FreeTextEntry8] : The patient asked to be seen today for evaluation and management of 6 to 7 days of hiccups.\par Approximately 1 year ago he underwent a right inguinal hernia repair and repair of a paraesophageal hernia.\par Within the last 2 months he underwent repair of a complex incisional hernia with placement of mesh.\par Most recently he underwent a right total hip replacement.  He has had increased right lower extremity edema.  He underwent a venous Doppler which did not reveal DVT.  His recent medications were reviewed.  He reports that he was given spinal anesthesia for his hip surgery and does not believe that he was recently intubated.\par He presently denies any chest pain.  He denies any cough or hemoptysis.  He has not had any recent fevers.  He denies any increased shortness of breath or wheezing.  He denies any abdominal pain.  He does not have any nausea or vomiting.  He has had significant constipation but has moved his bowels today.\par He reports that he was told to contact his PCP and have them prescribe Thorazine for his hiccups.  I advised him that this is not acceptable given his history of Parkinson's disease and medications.

## 2022-08-24 NOTE — HEALTH RISK ASSESSMENT
[Intercurrent hospitalizations] : was admitted to the hospital  [Never] : Never [No] : In the past 12 months have you used drugs other than those required for medical reasons? No [No falls in past year] : Patient reported no falls in the past year [Patient refused screening] : Patient refused screening [de-identified] : Orthopedics, cardiology [de-identified] : PT [de-identified] : Regular

## 2022-08-24 NOTE — ASSESSMENT
[FreeTextEntry1] : The patient has a history of paraesophageal hiatal hernia repair approximately 1 year ago.  Within the last 5 to 6 months he underwent repair of a complex incisional hernia.  6 days ago he underwent a right hip replacement.  He now presents with intermittent intractable hiccups since his surgery.  He reportedly had spinal anesthesia without intubation for his hip surgery.  He likely is having some diaphragmatic irritation triggering his hiccups.\par Will do imaging of his chest and upper abdomen to exclude any etiology or trigger\par Will need to discuss with neurology possible treatments to use to curb his hiccups given his history of Parkinson's disease and medication.  I left a message for Dr. Jarad Arana at [phone].\par GI f/u as well.\par F/U as needed\par He will call if his status worsens or does not improve\par He will call for any medical issues\par All of the above was discussed in detail with the patient and his wife and all of their questions were answered\par They verbally confirmed understanding of all of the above and agreement with the above plan.\par They are aware that Dr. Bajwa is on-call this evening and that Dr. Gutierrez is covering Dr. Rossi during the day tomorrow with Dr. Carrion on call tomorrow night.\par

## 2022-08-24 NOTE — PHYSICAL EXAM
[No Acute Distress] : no acute distress [Well Nourished] : well nourished [Well Developed] : well developed [Well-Appearing] : well-appearing [Normal Voice/Communication] : normal voice/communication [Normal Sclera/Conjunctiva] : normal sclera/conjunctiva [PERRL] : pupils equal round and reactive to light [EOMI] : extraocular movements intact [Normal Outer Ear/Nose] : the outer ears and nose were normal in appearance [No JVD] : no jugular venous distention [Supple] : supple [Thyroid Normal, No Nodules] : the thyroid was normal and there were no nodules present [No Respiratory Distress] : no respiratory distress  [No Accessory Muscle Use] : no accessory muscle use [Clear to Auscultation] : lungs were clear to auscultation bilaterally [Normal Rate] : normal rate  [Regular Rhythm] : with a regular rhythm [Normal S1, S2] : normal S1 and S2 [No Carotid Bruits] : no carotid bruits [No Extremity Clubbing/Cyanosis] : no extremity clubbing/cyanosis [Declined Breast Exam] : declined breast exam  [Soft] : abdomen soft [Non Tender] : non-tender [Non-distended] : non-distended [Normal Bowel Sounds] : normal bowel sounds [Declined Rectal Exam] : declined rectal exam [No CVA Tenderness] : no CVA  tenderness [No Spinal Tenderness] : no spinal tenderness [No Rash] : no rash [No Focal Deficits] : no focal deficits [Speech Grossly Normal] : speech grossly normal [Normal Affect] : the affect was normal [Alert and Oriented x3] : oriented to person, place, and time [de-identified] : Wearing full facemask [de-identified] : No stridor [de-identified] : R = 16; good air entry; no hiccups noted [de-identified] : Normal bilateral radial pulses; no cords; right lower extremity edema [de-identified] : Declined

## 2022-08-26 ENCOUNTER — NON-APPOINTMENT (OUTPATIENT)
Age: 77
End: 2022-08-26

## 2022-09-04 ENCOUNTER — NON-APPOINTMENT (OUTPATIENT)
Age: 77
End: 2022-09-04

## 2022-09-28 ENCOUNTER — OUTPATIENT (OUTPATIENT)
Dept: OUTPATIENT SERVICES | Facility: HOSPITAL | Age: 77
LOS: 1 days | Discharge: HOME | End: 2022-09-28

## 2022-09-28 DIAGNOSIS — Z98.890 OTHER SPECIFIED POSTPROCEDURAL STATES: Chronic | ICD-10-CM

## 2022-09-28 DIAGNOSIS — D50.9 IRON DEFICIENCY ANEMIA, UNSPECIFIED: ICD-10-CM

## 2022-09-28 DIAGNOSIS — Z87.09 PERSONAL HISTORY OF OTHER DISEASES OF THE RESPIRATORY SYSTEM: Chronic | ICD-10-CM

## 2022-09-30 ENCOUNTER — NON-APPOINTMENT (OUTPATIENT)
Age: 77
End: 2022-09-30

## 2022-10-03 ENCOUNTER — NON-APPOINTMENT (OUTPATIENT)
Age: 77
End: 2022-10-03

## 2022-10-05 ENCOUNTER — APPOINTMENT (OUTPATIENT)
Dept: HEMATOLOGY ONCOLOGY | Facility: CLINIC | Age: 77
End: 2022-10-05

## 2022-10-06 ENCOUNTER — RESULT REVIEW (OUTPATIENT)
Age: 77
End: 2022-10-06

## 2022-10-06 ENCOUNTER — APPOINTMENT (OUTPATIENT)
Dept: HEMATOLOGY ONCOLOGY | Facility: CLINIC | Age: 77
End: 2022-10-06

## 2022-10-06 ENCOUNTER — OUTPATIENT (OUTPATIENT)
Dept: OUTPATIENT SERVICES | Facility: HOSPITAL | Age: 77
LOS: 1 days | End: 2022-10-06
Payer: MEDICARE

## 2022-10-06 VITALS
TEMPERATURE: 97.3 F | BODY MASS INDEX: 25.12 KG/M2 | HEIGHT: 69.5 IN | WEIGHT: 173.5 LBS | SYSTOLIC BLOOD PRESSURE: 139 MMHG | DIASTOLIC BLOOD PRESSURE: 84 MMHG | HEART RATE: 51 BPM | RESPIRATION RATE: 16 BRPM | OXYGEN SATURATION: 100 %

## 2022-10-06 DIAGNOSIS — Z98.890 OTHER SPECIFIED POSTPROCEDURAL STATES: Chronic | ICD-10-CM

## 2022-10-06 DIAGNOSIS — D50.9 IRON DEFICIENCY ANEMIA, UNSPECIFIED: ICD-10-CM

## 2022-10-06 DIAGNOSIS — Z87.09 PERSONAL HISTORY OF OTHER DISEASES OF THE RESPIRATORY SYSTEM: Chronic | ICD-10-CM

## 2022-10-06 LAB
BASOPHILS # BLD AUTO: 0.07 K/UL — SIGNIFICANT CHANGE UP (ref 0–0.2)
BASOPHILS NFR BLD AUTO: 1 % — SIGNIFICANT CHANGE UP (ref 0–2)
EOSINOPHIL # BLD AUTO: 0.52 K/UL — HIGH (ref 0–0.5)
EOSINOPHIL NFR BLD AUTO: 7.5 % — HIGH (ref 0–6)
HCT VFR BLD CALC: 35.7 % — LOW (ref 39–50)
HGB BLD-MCNC: 12.1 G/DL — LOW (ref 13–17)
IMM GRANULOCYTES NFR BLD AUTO: 0.3 % — SIGNIFICANT CHANGE UP (ref 0–0.9)
LYMPHOCYTES # BLD AUTO: 1.12 K/UL — SIGNIFICANT CHANGE UP (ref 1–3.3)
LYMPHOCYTES # BLD AUTO: 16.2 % — SIGNIFICANT CHANGE UP (ref 13–44)
MCHC RBC-ENTMCNC: 28.9 PG — SIGNIFICANT CHANGE UP (ref 27–34)
MCHC RBC-ENTMCNC: 33.9 G/DL — SIGNIFICANT CHANGE UP (ref 32–36)
MCV RBC AUTO: 85.4 FL — SIGNIFICANT CHANGE UP (ref 80–100)
MONOCYTES # BLD AUTO: 0.61 K/UL — SIGNIFICANT CHANGE UP (ref 0–0.9)
MONOCYTES NFR BLD AUTO: 8.8 % — SIGNIFICANT CHANGE UP (ref 2–14)
NEUTROPHILS # BLD AUTO: 4.59 K/UL — SIGNIFICANT CHANGE UP (ref 1.8–7.4)
NEUTROPHILS NFR BLD AUTO: 66.2 % — SIGNIFICANT CHANGE UP (ref 43–77)
NRBC # BLD: 0 /100 WBCS — SIGNIFICANT CHANGE UP (ref 0–0)
PLATELET # BLD AUTO: 306 K/UL — SIGNIFICANT CHANGE UP (ref 150–400)
RBC # BLD: 4.18 M/UL — LOW (ref 4.2–5.8)
RBC # FLD: 12.6 % — SIGNIFICANT CHANGE UP (ref 10.3–14.5)
RETICS #: 40.6 K/UL — SIGNIFICANT CHANGE UP (ref 25–125)
RETICS/RBC NFR: 1 % — SIGNIFICANT CHANGE UP (ref 0.5–2.5)
WBC # BLD: 6.93 K/UL — SIGNIFICANT CHANGE UP (ref 3.8–10.5)
WBC # FLD AUTO: 6.93 K/UL — SIGNIFICANT CHANGE UP (ref 3.8–10.5)

## 2022-10-06 PROCEDURE — 86901 BLOOD TYPING SEROLOGIC RH(D): CPT

## 2022-10-06 PROCEDURE — 86900 BLOOD TYPING SEROLOGIC ABO: CPT

## 2022-10-06 PROCEDURE — 86850 RBC ANTIBODY SCREEN: CPT

## 2022-10-06 PROCEDURE — 99215 OFFICE O/P EST HI 40 MIN: CPT

## 2022-10-06 RX ORDER — OXYCODONE 5 MG/1
5 TABLET ORAL
Refills: 0 | Status: DISCONTINUED | COMMUNITY
End: 2022-10-06

## 2022-10-07 LAB
FERRITIN SERPL-MCNC: 36 NG/ML
FOLATE SERPL-MCNC: 13.1 NG/ML
IRON SATN MFR SERPL: 12 %
IRON SERPL-MCNC: 43 UG/DL
TIBC SERPL-MCNC: 354 UG/DL
UIBC SERPL-MCNC: 310 UG/DL
VIT B12 SERPL-MCNC: 434 PG/ML

## 2022-10-07 NOTE — PHYSICAL EXAM
[Restricted in physically strenuous activity but ambulatory and able to carry out work of a light or sedentary nature] : Status 1- Restricted in physically strenuous activity but ambulatory and able to carry out work of a light or sedentary nature, e.g., light house work, office work [Normal] : affect appropriate [de-identified] : presented to the office by himself, ambulated to the office with a cane,

## 2022-10-07 NOTE — REVIEW OF SYSTEMS
[Negative] : Allergic/Immunologic [Dizziness] : dizziness [Difficulty Walking] : difficulty walking [Fatigue] : no fatigue [FreeTextEntry3] : wears reading glasses [FreeTextEntry5] : Sinus Bradycardia HR 48 bpm, denies pacemaker  [FreeTextEntry8] : no hematuria  [FreeTextEntry9] : Right hip replacement 8/2022, chronic Knee pain bilateral sec to OA [de-identified] : Parkinson's disease uses cane during ambulation sometimes a walker

## 2022-10-07 NOTE — HISTORY OF PRESENT ILLNESS
[Disease:__________________________] : Disease: [unfilled] [Cardiovascular] : Cardiovascular [Constitutional] : Constitutional [ENT] : ENT [Dermatologic] : Dermatologic [Endocrine] : Endocrine [Gastrointestinal] : Gastrointestinal [Genitourinary] : Genitourinary [Gynecologic] : Gynecologic [Infectious] : Infectious [Musculoskeletal] : Musculoskeletal [Neurologic] : Neurologic [Pain] : Pain [Pulmonary] : Pulmonary [Hematologic] : Hematologic [Date: ____________] : Patient's last distress assessment performed on [unfilled]. [0 - No Distress] : Distress Level: 0 [80: Normal activity with effort; some signs or symptoms of disease.] : 80: Normal activity with effort; some signs or symptoms of disease.  [ECOG Performance Status: 1 - Restricted in physically strenuous activity but ambulatory and able to carry out work of a light or sedentary nature] : Performance Status: 1 - Restricted in physically strenuous activity but ambulatory and able to carry out work of a light or sedentary nature, e.g., light house work, office work [de-identified] : Hai Perales is a 75 year old male presenting to the office for hematologic evaluation. He is referred here by Dr. Kurtis Rossi (internal medicine). Patient was noted have the following lab values from last month: hgb 6.8, ferritin 6, serum iron 15, iron saturation 4%. He admitted to having a history of iron deficiency anemia for the last 5 years. He admitted to oral iron supplementation but denied eating red meats. He denied receiving blood transfusions and intravenous iron in the past.   [FreeTextEntry1] : iron 65 mg [de-identified] : He has felt fatigue yesterday; some dizziness and no falls. No nausea and no vomiting. He has not had bleeding\par \par 10/06/2022 - Patient seen in a follow up visit after 20 months (last visit was 2/2021). Pt seen for AUBREY was on oral Fe - stated he stopped taking it few months ago. \par He drove himself to this appointment presented to the office by himself. \par  He c/o intermittent chronic dizziness , worsening balance uses cane for ambulation, Denies falls, syncope, bleeding, bruising, blood transfusions, IV iron, fever, chills, night sweats, weight loss. \par He had LAD stent on ASA, Plavix, NTG SL prn, Ranexa. Sinus Bradycardia HR @ 48 bpm followed by per Dr. Ari Celis Cardiologist. No pacemaker placement plans as of yet. \par He takes Sinemet for  Parkinson's followed by Neurologist Dr. Jarad Arana in [place]. He has worsening balance uses a cane for ambulation. \par Since his last visit with us he has undergone Paraesophageal / complex incisional hernia surgery. \par Underwent a Right Total Hip replacement 8/2022. \par Evaluated by Pulm Dr. Olivas for Intractable Hiccups was on Thorazine, then changed to Baclofen by Neurologist. Hiccups have resolved. \par .\par \par  [FreeTextEntry7] : Pt able to drive, runs errands and attend to his ADLs

## 2022-10-07 NOTE — ASSESSMENT
[Supportive] : Goals of care discussed with patient: Supportive [Palliative Care Plan] : not applicable at this time [FreeTextEntry1] : MARYBETH Landry is a patient with a history of iron deficiency anemia partially on the basis of a dietary factor. THe HGB is not low enough to account for his symptoms but I would advise him to continue with the oral iron daily. His cholesterol level was discussed with him and he will make dietary adjustments.\par  Overall management of his conditions discussed and he will continue with the physical therapy.\par Patient seen with A Hai MENDOZA\par RTC 4 months\par \par 10/6/2022- Mr. Hai Perales was seen in a follow up visit today after 20 months. Pt w h/o AUBREY self discontinued oral Fe, HTN, HLD, CAD s/p LAD stent on ASA, Plavix, Sinus Node dysfxn, Sinus Bradycardia HR 48 bpm, Parkinson's disease on Sinemet, with recent balance difficulty ambulated with a cane, chronic dizziness, Autonomic Orthostatic Hypotension, Paraesophageal, incisional hernia surgery, Right Total Hip replacement 8/22. \par He denies any bleeding, blood transfusions, IV iron. \par Today's CBC - Anemia without microcytosis - Hgb = 12.1, HCT = 35.7%,  MCV = 85.4, RDW = 12.6%, \par Reticulocyte count, Vitamin B12, Folate  - WNL, \par Ferritin = 36 (trending down), Iron = 43, TIBC = 354, % Fe Saturation = 12%. \par No indication for blood transfusion or IV iron. \par Will recommend to take Slow Fe 2 tabs every other day with OJ, citrus, Vit C. Patient doesn't eat much of red meat due to his cardiac condition. Encouraged to incorporate iron rich vegetables with citrus for better absorption. \par F/u with PCP, Neurologist, Cardiologist at their discretion, and recommendation. \par Medication compliance was advised. CBC results were discussed in person, all questions, concerns were addressed with the patient during this visit to his satisfaction. \par RTC in 4 months.\par Case management, care plan was discussed with Dr. Adalid Wills. \par \par Addendum Note: 10/7/2022 - CBC, Ferritin, Iron studies, Retic count, Vit B12, folate levels were discussed with the patient over the phone. all questions, concerns were addressed during the visit and today's phone call.\par Per pt's request will mail copies of this visit's lab results to his home address. \par

## 2022-10-07 NOTE — RESULTS/DATA
[FreeTextEntry1] : patient given copy of blood test result. HGB 12.2 with normal MCV. \par normal platelet count and normal WBC count.\par I called the patient to inform him of the low ferritin 26; his cholesterol is mildly elevated at 215\par \par 10/06/2022- CBC - WBC = 6.93, Hgb = 12.1, HCT = 35.7%, PLT = 306, MCV = 85.4, RDW = 12.6%, \par Reticulocyte count, Vitamin B12, Folate  - WNL, \par Ferritin = 36 (trending down), Iron = 43, TIBC = 354, % Fe Saturation = 12%

## 2022-10-12 ENCOUNTER — NON-APPOINTMENT (OUTPATIENT)
Age: 77
End: 2022-10-12

## 2022-10-12 ENCOUNTER — APPOINTMENT (OUTPATIENT)
Dept: CARDIOLOGY | Facility: CLINIC | Age: 77
End: 2022-10-12

## 2022-10-12 VITALS — DIASTOLIC BLOOD PRESSURE: 78 MMHG | SYSTOLIC BLOOD PRESSURE: 126 MMHG | HEART RATE: 54 BPM

## 2022-10-12 VITALS
DIASTOLIC BLOOD PRESSURE: 80 MMHG | WEIGHT: 174 LBS | HEIGHT: 69.5 IN | BODY MASS INDEX: 25.19 KG/M2 | OXYGEN SATURATION: 100 % | HEART RATE: 51 BPM | SYSTOLIC BLOOD PRESSURE: 133 MMHG

## 2022-10-12 VITALS — HEART RATE: 54 BPM | SYSTOLIC BLOOD PRESSURE: 130 MMHG | DIASTOLIC BLOOD PRESSURE: 76 MMHG

## 2022-10-12 PROCEDURE — 93000 ELECTROCARDIOGRAM COMPLETE: CPT

## 2022-10-12 PROCEDURE — 99214 OFFICE O/P EST MOD 30 MIN: CPT

## 2022-10-12 PROCEDURE — 36415 COLL VENOUS BLD VENIPUNCTURE: CPT

## 2022-10-12 RX ORDER — ASPIRIN 81 MG/1
81 TABLET, DELAYED RELEASE ORAL
Refills: 0 | Status: DISCONTINUED | COMMUNITY
Start: 2021-10-29 | End: 2022-10-12

## 2022-10-12 NOTE — HISTORY OF PRESENT ILLNESS
[FreeTextEntry1] : In 2012 the patient presented with exertional chest burning. Abnormal stress echocardiogram. Cardiac catheterization revealed nonobstructive coronary disease (only 40% distal LAD). He been maintained on Ranexa with good relief of his symptoms. \par December 6, 2016. The last couple weeks the patient has been getting his chest tightness, primarily at rest when he stressed. He's had to use nitroglycerin at least once a week. He's had no chest discomfort when he walks on the treadmill or exercise in the gym. Patient also feels slightly lightheaded once in a great while. He usually is sitting when it occurs. He does not have it when he is walking or standing.\par Stress echo on December 16 was negative and the echo again showed mild to moderate MR, unchanged.\par December 4, 2017. One year since last visit. Symptoms, maybe once a week, and usually related to emotional upset and stress. Responds to nitroglycerin within 5 minutes. He never increased his Ranexa. He was complaining about the cost of Ranexa and we discussed the rationale for Ranexa versus other medications in his case. Beta blockers must be avoided given his sinus bradycardia. He had labs with Dr. Rossi on September 28. He remains with iron deficiency anemia, but he had a negative GI workup in 2014. Since then he has increased his iron from once a day to twice a day. His lipids had a cholesterol of 147, HDL 70, LDL 63, triglycerides 68, and he remains on atorvastatin. His hemoglobin A1c was 5.5, and the rest of his labs were unremarkable. He had a flu shot with Dr. Rossi.\par April 24, 2018. Patient has recently been noticing that he is getting more short of breath with exertion with some chest pressure. Can barely walk a block or 2. 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 The orthopedist wants to give him a synthetic cartilage injection and he is seeing a vascular doctor later this week because of the swelling in his legs, which is a little more on the right side, where his knee is acting up. He said he walks and feels like an old man. EKG is sinus bradycardia and unchanged. Found to have severe iron deficiency anemia and referred to GI.\par June 7, 2018. The patient returns in followup. Had colonoscopy and endoscopy that were mostly negative. Still considering capsule study. In the meantime, he is on iron twice a day, and his levels have come up. He had been transfused 3 units at the beginning. He still has some orthostatic dizziness at times, but otherwise most of his symptoms are gone. He is getting ready for a trip to [facility]. His EKG remains with sinus bradycardia, and some nonspecific ST changes, but unchanged.\par October 9, 2018. Patient was at hematology on October 5, and a heart rate of 42, was recorded. I was told he had a low heart rate and needed an appointment and he was scheduled for today. Now the patient says when he called he told them he was dizzy and having symptoms, which was not related to me. Here, he is in sinus bradycardia at 46, with mild ST depressions V4 through 6 as well as lead II and aVF. His dizziness is mostly when he changes positions that'll last for a few seconds, and then passes. He is not limited in his exercise capacity and does not quite get near syncopal. No other complaints. Able to walk 20 minutes the other day without stopping.\par November 21, 2019.  First visit in over a year.  Slightly depressed over diagnosis of Parkinson's but so far mild case and doing well.  On Azilect 0.5 twice daily and resagiline 1 mg daily.  Heart rate still slow at 41 but no syncope or near syncope.  Occasionally feels a little dizzy sitting or walking but does not sound like near syncope.  Changed his atorvastatin to every other day on his own but has not had lipids since October of last year with me.  He will have Dr. Rossi added to the blood test when he sees him in a few weeks.  Hemoglobin and hematocrit and iron levels have been holding steady.  No chest pain or shortness of breath.  Considering some experimental options for the Parkinson's going forward.\par December 9, 2019.  Patient called and wanted to come in.  Saw Dr. Sohail Arana of neurology who told him he thinks he needs a pacemaker and that his low heart rate could be explaining some of his Parkinson's symptoms.  I put in a call to Dr. Arana and I am awaiting a response.  In the meantime reviewed the symptoms and the procedure with the patient.  At times he says he does have some lightheadedness and dizziness and wants the pacemaker again at other times is too nervous does not want to stay in hospital overnight and does not think he is having any symptoms different than the last few years.  Certainly no syncope and no definite near syncope.  Heart rate here was 48.  Blood pressure okay.  No recent change in medications.\par July 7, 2020.  Patient here in follow-up.  Remains in sinus bradycardia at 47 with an otherwise normal ECG except incomplete right bundle branch block.  Recent labs with cholesterol 209, HDL 68, .  Normal thyroid function.  Last week he felt dizzy for most of the week and most of the days on and off  somewhat lightheaded but not near syncopal, not vertigo.  This week however he feels perfectly back to himself so not that likely to be from conduction disease.  No other complaints.  He remains on Lipitor and Ranexa.\par August 9, 2021.  Patient here for preop cardiac evaluation due to an abnormal ECG.  Last here over a year ago.  Has been doing well for the most part but has developed mild Parkinson's.  Not having any issues with angina currently but upon pushing the history he does have some orthostatic dizziness now and then.  Even to the point now where he knows to stop and wait a few minutes when he gets out of the car etc. (Actually I had mentioned this in my note over a year ago and that his symptoms were probably orthostasis related to his Parkinson's.)  Here in fact there is a drop in his blood pressure from lying to sitting and is probably related to his parkinsonism.  Otherwise no symptoms or signs of heart failure unstable angina etc.  He always has a mildly abnormal ECG with some ST depressions in the inferior leads and V4 through 6 and the EKG done preop at [facility] was not significantly changed from his previous EKGs of over a year ago.  He will be having surgery on both an inguinal and an abdominal hernia by Dr. Bo.\par September 10, 2021.  Patient here for urgent visit.  Called yesterday with a burning chest pain and I was unavailable and was told to speak with or see his internist Dr. Rossi.  He saw Dr. Rossi this morning and EKG was totally unchanged with his usual sinus bradycardia and minimal ST depressions.  He is now here. His surgery and the recovery was a little more difficult. And now for the last week or so he has been having his usual exertional chest burning relieved with rest. It does not seem to be progressive. No signs that he is anemic again but he is more fatigued since the surgery. He also talked about his mild orthostasis which again is more likely related to the Parkinson's. After long discussion we decided that if his troponin is positive but he is stable we will schedule him for an angiogram next week, and if troponin is negative we will try to double up on the Ranexa and consider a stress test or a CT angio of his coronaries.\par September 13, 2021. Spoke with patient.  Troponin negative.  Excellent lipid profile, normal hemoglobin A1c, normal BNP, sodium 131, hematocrit 37.  Patient actually feeling much better and would like to wait until the stress test scheduled at the beginning of October.  Only complaint is vague lightheadedness at times. \par October 6, 2021.  Patient returned for stress echo and follow-up.  He has been taking the Ranexa 1000 twice daily 1 day and 500 twice daily the next alternating for some reason of his own.  Has not really noticed any change in his symptoms.  On the stress test he got his symptoms at 4-1/2 minutes of a low level protocol with his heart rate 80 and there were ST depressions mostly in II and aVF but also in V3 through V6 that persisted in recovery.  He was able to do 10 minutes of the low level protocol to a heart rate of 111.  He got his usual chest burning that increased with exercise and took about 8 or 9 minutes of recovery to go away.  The echocardiogram post exercise was actually normal without wall motion abnormalities.  The resting echo itself had normal LV and RV function with mild to moderate MR and mild TR and was really unchanged from 2016.  After long discussion with patient and his wife we elected to schedule coronary angiography.\par October 13, 2021.Patient had cath yesterday.  70% stenosis in the mid LAD confirmed as significant with a positive IFR.  Stent was placed.  He should remain on dual antiplatelet therapy for 3 months.  I spoke with Dr. Man yesterday and I left a message for the patient this evening.  He should make follow-up appointment with me.\par October 29, 2021.  Patient returns in follow-up after his LAD stent as noted above.  EKG here is sinus at 45 with his usual mild ST depressions in leads II, V2 through 5. Feels wonderful since the stent but is also a little anxious ever since realizing that he needs a stent.  Still on Ranexa along with his statin and dual antiplatelet therapy and his Parkinson's medication.  Blood pressure running a little high today probably just from the stress.  Labs will be checked.\par December 17, 2021.  Patient here in follow-up.  Doing well from a cardiac point of view.  Another abdominal hernia popped up but he may need surgery again but I told him to ideally wait 6 months from the time of his stent.  He also needs injections in his knee and possibly hip and may need surgery in the future.  Good spirits with no other symptoms or complaints.  Still on dual antiplatelet therapy.  May be going away in January but a lot of stress with his mother-in-law and her Alzheimer's etc.\par February 14, 2022.  Semiurgent visit as patient just found out he is going to need hip replacement surgery.  Will probably be doing it with Dr. Nick Mackenzie at [facility].  Also discussed his hernia surgery with Dr. Bo to and most likely he will have the hernia surgery first.  For now he is willing to wait a little longer and stay on dual antiplatelet therapy.  No chest pain no shortness of breath no other new issues.  Reviewed the pros and cons of waiting 6 months versus waiting a little last in order to have the surgery.\par March 11, 2022.  Due to his abdominal discomfort he finally scheduled the hernia surgery from March 29.  He has remained stable from a cardiac point of view.  Still on dual antiplatelet therapy.  No chest pain, shortness of breath, melena etc.  Baseline EKG still abnormal with some resting ST depressions but unchanged from previous tracings.  He thinks he will need hip replacement surgery pretty soon after that as well.\par May 13, 2022.  Patient returns after his hernia surgery of March 29.  He remains in sinus bradycardia at 48 and the rest of his EKG is unchanged.  Had a rough time the first few weeks postop because of severe pain but now is doing well.\par July 13, 2022.  Patient came for urgent visit.  Has been feeling dizzy and lightheaded lately and call the office and could not get through so just came over.  Very difficult history to pin down but when trying to be more exact, never truly near syncopal, episodes last at least a minute but usually not more than 2 minutes.  Does occasionally say he just feels a little fuzzy but it is not chronic.  He does feel like taking a nap in the afternoon and has a little more generalized fatigue but that is a different symptom.  Here in the office he was in sinus bradycardia at only 44 but totally asymptomatic.  He is on Ranexa and he is on Parkinson's medications.\par August 9, 2022.  Patient called to say he is scheduled for hip replacement with Dr. Nick Mackenzie on August 17.  He is having presurgical testing at [facility] later this week.  Based on his exam on July 13, if his presurgical lab work is in order I see no contraindication to the upcoming surgery and anesthesia.\par October 12, 2022.  Patient here in follow-up.  Hip surgery went well without complication.  The home physical therapist stopped coming 2 weeks ago so for 2 weeks he did therapy on his own and now is starting to do outpatient physical therapy.  Still sore and slow to get around.  Still complains of dizziness which is mostly when he changes position and it just lasts for a few seconds or so.  Not orthostatic in the office, and when he is walking he does not have fatigue or shortness of breath or dizziness to blame on his low heart rate; as a matter fact his last stress test 1 year ago his resting heart rate was 45 but went up to 111 with a little exercise.  Here today he is resting ECG had a heart rate of 47 and during his vital signs his heart rate was 54.  He may end up needing a knee replacement in the future as well.

## 2022-10-12 NOTE — REVIEW OF SYSTEMS
[Dizziness] : dizziness [Depression] : depression [Anxiety] : anxiety [Negative] : Heme/Lymph [Weight Gain (___ Lbs)] : no recent weight gain [Feeling Fatigued] : not feeling fatigued [Weight Loss (___ Lbs)] : no recent weight loss [Dyspnea on exertion] : not dyspnea during exertion [Chest Discomfort] : no chest discomfort [Lower Ext Edema] : no extremity edema [Palpitations] : no palpitations [Syncope] : no syncope [Abdominal Pain] : no abdominal pain [Suicidal] : not suicidal [FreeTextEntry5] : see HPI [FreeTextEntry7] : Abdominal hernia [FreeTextEntry9] : s/p hip replacement [de-identified] : Parkinsonism and some orthostasis [de-identified] : Seems a little depressed and anxious

## 2022-10-12 NOTE — REASON FOR VISIT
[FreeTextEntry1] : The patient is a 76-year-old male with a history of angina with nonobstructive coronary artery disease,  and sinus node dysfunction and iron deficiency anemia here because of chest pain. Now here s/p stent to LAD. Now s/p hip surgery

## 2022-10-13 LAB
ALBUMIN SERPL ELPH-MCNC: 4.3 G/DL
ALP BLD-CCNC: 88 U/L
ALT SERPL-CCNC: <5 U/L
ANION GAP SERPL CALC-SCNC: 12 MMOL/L
AST SERPL-CCNC: 11 U/L
BASOPHILS # BLD AUTO: 0.05 K/UL
BASOPHILS NFR BLD AUTO: 1 %
BILIRUB SERPL-MCNC: 0.4 MG/DL
BUN SERPL-MCNC: 12 MG/DL
CALCIUM SERPL-MCNC: 9.2 MG/DL
CHLORIDE SERPL-SCNC: 94 MMOL/L
CHOLEST SERPL-MCNC: 195 MG/DL
CO2 SERPL-SCNC: 24 MMOL/L
CREAT SERPL-MCNC: 0.81 MG/DL
EGFR: 93 ML/MIN/1.73M2
EOSINOPHIL # BLD AUTO: 0.37 K/UL
EOSINOPHIL NFR BLD AUTO: 7.3 %
ESTIMATED AVERAGE GLUCOSE: 111 MG/DL
GLUCOSE SERPL-MCNC: 108 MG/DL
HBA1C MFR BLD HPLC: 5.5 %
HCT VFR BLD CALC: 33.6 %
HDLC SERPL-MCNC: 73 MG/DL
HGB BLD-MCNC: 11.3 G/DL
IMM GRANULOCYTES NFR BLD AUTO: 0.4 %
LDLC SERPL CALC-MCNC: 97 MG/DL
LYMPHOCYTES # BLD AUTO: 0.94 K/UL
LYMPHOCYTES NFR BLD AUTO: 18.6 %
MAN DIFF?: NORMAL
MCHC RBC-ENTMCNC: 28.4 PG
MCHC RBC-ENTMCNC: 33.6 GM/DL
MCV RBC AUTO: 84.4 FL
MONOCYTES # BLD AUTO: 0.49 K/UL
MONOCYTES NFR BLD AUTO: 9.7 %
NEUTROPHILS # BLD AUTO: 3.18 K/UL
NEUTROPHILS NFR BLD AUTO: 63 %
NONHDLC SERPL-MCNC: 122 MG/DL
NT-PROBNP SERPL-MCNC: 245 PG/ML
PLATELET # BLD AUTO: 282 K/UL
POTASSIUM SERPL-SCNC: 4.7 MMOL/L
PROT SERPL-MCNC: 6.8 G/DL
RBC # BLD: 3.98 M/UL
RBC # FLD: 12.8 %
SODIUM SERPL-SCNC: 130 MMOL/L
TRIGL SERPL-MCNC: 125 MG/DL
WBC # FLD AUTO: 5.05 K/UL

## 2022-11-10 ENCOUNTER — NON-APPOINTMENT (OUTPATIENT)
Age: 77
End: 2022-11-10

## 2022-11-24 ENCOUNTER — TRANSCRIPTION ENCOUNTER (OUTPATIENT)
Age: 77
End: 2022-11-24

## 2022-12-16 ENCOUNTER — APPOINTMENT (OUTPATIENT)
Dept: CARDIOLOGY | Facility: CLINIC | Age: 77
End: 2022-12-16

## 2022-12-16 ENCOUNTER — NON-APPOINTMENT (OUTPATIENT)
Age: 77
End: 2022-12-16

## 2022-12-16 VITALS
SYSTOLIC BLOOD PRESSURE: 120 MMHG | WEIGHT: 170 LBS | DIASTOLIC BLOOD PRESSURE: 80 MMHG | OXYGEN SATURATION: 100 % | HEART RATE: 49 BPM | BODY MASS INDEX: 24.74 KG/M2

## 2022-12-16 PROCEDURE — 93000 ELECTROCARDIOGRAM COMPLETE: CPT

## 2022-12-16 PROCEDURE — 99214 OFFICE O/P EST MOD 30 MIN: CPT

## 2022-12-16 NOTE — HISTORY OF PRESENT ILLNESS
[FreeTextEntry1] : In 2012 the patient presented with exertional chest burning. Abnormal stress echocardiogram. Cardiac catheterization revealed nonobstructive coronary disease (only 40% distal LAD). He been maintained on Ranexa with good relief of his symptoms. \par December 6, 2016. The last couple weeks the patient has been getting his chest tightness, primarily at rest when he stressed. He's had to use nitroglycerin at least once a week. He's had no chest discomfort when he walks on the treadmill or exercise in the gym. Patient also feels slightly lightheaded once in a great while. He usually is sitting when it occurs. He does not have it when he is walking or standing.\par Stress echo on December 16 was negative and the echo again showed mild to moderate MR, unchanged.\par December 4, 2017. One year since last visit. Symptoms, maybe once a week, and usually related to emotional upset and stress. Responds to nitroglycerin within 5 minutes. He never increased his Ranexa. He was complaining about the cost of Ranexa and we discussed the rationale for Ranexa versus other medications in his case. Beta blockers must be avoided given his sinus bradycardia. He had labs with Dr. Rossi on September 28. He remains with iron deficiency anemia, but he had a negative GI workup in 2014. Since then he has increased his iron from once a day to twice a day. His lipids had a cholesterol of 147, HDL 70, LDL 63, triglycerides 68, and he remains on atorvastatin. His hemoglobin A1c was 5.5, and the rest of his labs were unremarkable. He had a flu shot with Dr. Rossi.\par April 24, 2018. Patient has recently been noticing that he is getting more short of breath with exertion with some chest pressure. Can barely walk a block or 2. 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 The orthopedist wants to give him a synthetic cartilage injection and he is seeing a vascular doctor later this week because of the swelling in his legs, which is a little more on the right side, where his knee is acting up. He said he walks and feels like an old man. EKG is sinus bradycardia and unchanged. Found to have severe iron deficiency anemia and referred to GI.\par June 7, 2018. The patient returns in followup. Had colonoscopy and endoscopy that were mostly negative. Still considering capsule study. In the meantime, he is on iron twice a day, and his levels have come up. He had been transfused 3 units at the beginning. He still has some orthostatic dizziness at times, but otherwise most of his symptoms are gone. He is getting ready for a trip to [facility]. His EKG remains with sinus bradycardia, and some nonspecific ST changes, but unchanged.\par October 9, 2018. Patient was at hematology on October 5, and a heart rate of 42, was recorded. I was told he had a low heart rate and needed an appointment and he was scheduled for today. Now the patient says when he called he told them he was dizzy and having symptoms, which was not related to me. Here, he is in sinus bradycardia at 46, with mild ST depressions V4 through 6 as well as lead II and aVF. His dizziness is mostly when he changes positions that'll last for a few seconds, and then passes. He is not limited in his exercise capacity and does not quite get near syncopal. No other complaints. Able to walk 20 minutes the other day without stopping.\par November 21, 2019.  First visit in over a year.  Slightly depressed over diagnosis of Parkinson's but so far mild case and doing well.  On Azilect 0.5 twice daily and resagiline 1 mg daily.  Heart rate still slow at 41 but no syncope or near syncope.  Occasionally feels a little dizzy sitting or walking but does not sound like near syncope.  Changed his atorvastatin to every other day on his own but has not had lipids since October of last year with me.  He will have Dr. Rossi added to the blood test when he sees him in a few weeks.  Hemoglobin and hematocrit and iron levels have been holding steady.  No chest pain or shortness of breath.  Considering some experimental options for the Parkinson's going forward.\par December 9, 2019.  Patient called and wanted to come in.  Saw Dr. Sohail Arana of neurology who told him he thinks he needs a pacemaker and that his low heart rate could be explaining some of his Parkinson's symptoms.  I put in a call to Dr. Arana and I am awaiting a response.  In the meantime reviewed the symptoms and the procedure with the patient.  At times he says he does have some lightheadedness and dizziness and wants the pacemaker again at other times is too nervous does not want to stay in hospital overnight and does not think he is having any symptoms different than the last few years.  Certainly no syncope and no definite near syncope.  Heart rate here was 48.  Blood pressure okay.  No recent change in medications.\par July 7, 2020.  Patient here in follow-up.  Remains in sinus bradycardia at 47 with an otherwise normal ECG except incomplete right bundle branch block.  Recent labs with cholesterol 209, HDL 68, .  Normal thyroid function.  Last week he felt dizzy for most of the week and most of the days on and off  somewhat lightheaded but not near syncopal, not vertigo.  This week however he feels perfectly back to himself so not that likely to be from conduction disease.  No other complaints.  He remains on Lipitor and Ranexa.\par August 9, 2021.  Patient here for preop cardiac evaluation due to an abnormal ECG.  Last here over a year ago.  Has been doing well for the most part but has developed mild Parkinson's.  Not having any issues with angina currently but upon pushing the history he does have some orthostatic dizziness now and then.  Even to the point now where he knows to stop and wait a few minutes when he gets out of the car etc. (Actually I had mentioned this in my note over a year ago and that his symptoms were probably orthostasis related to his Parkinson's.)  Here in fact there is a drop in his blood pressure from lying to sitting and is probably related to his parkinsonism.  Otherwise no symptoms or signs of heart failure unstable angina etc.  He always has a mildly abnormal ECG with some ST depressions in the inferior leads and V4 through 6 and the EKG done preop at [facility] was not significantly changed from his previous EKGs of over a year ago.  He will be having surgery on both an inguinal and an abdominal hernia by Dr. Bo.\par September 10, 2021.  Patient here for urgent visit.  Called yesterday with a burning chest pain and I was unavailable and was told to speak with or see his internist Dr. Rossi.  He saw Dr. Rossi this morning and EKG was totally unchanged with his usual sinus bradycardia and minimal ST depressions.  He is now here. His surgery and the recovery was a little more difficult. And now for the last week or so he has been having his usual exertional chest burning relieved with rest. It does not seem to be progressive. No signs that he is anemic again but he is more fatigued since the surgery. He also talked about his mild orthostasis which again is more likely related to the Parkinson's. After long discussion we decided that if his troponin is positive but he is stable we will schedule him for an angiogram next week, and if troponin is negative we will try to double up on the Ranexa and consider a stress test or a CT angio of his coronaries.\par September 13, 2021. Spoke with patient.  Troponin negative.  Excellent lipid profile, normal hemoglobin A1c, normal BNP, sodium 131, hematocrit 37.  Patient actually feeling much better and would like to wait until the stress test scheduled at the beginning of October.  Only complaint is vague lightheadedness at times. \par October 6, 2021.  Patient returned for stress echo and follow-up.  He has been taking the Ranexa 1000 twice daily 1 day and 500 twice daily the next alternating for some reason of his own.  Has not really noticed any change in his symptoms.  On the stress test he got his symptoms at 4-1/2 minutes of a low level protocol with his heart rate 80 and there were ST depressions mostly in II and aVF but also in V3 through V6 that persisted in recovery.  He was able to do 10 minutes of the low level protocol to a heart rate of 111.  He got his usual chest burning that increased with exercise and took about 8 or 9 minutes of recovery to go away.  The echocardiogram post exercise was actually normal without wall motion abnormalities.  The resting echo itself had normal LV and RV function with mild to moderate MR and mild TR and was really unchanged from 2016.  After long discussion with patient and his wife we elected to schedule coronary angiography.\par October 13, 2021.Patient had cath yesterday.  70% stenosis in the mid LAD confirmed as significant with a positive IFR.  Stent was placed.  He should remain on dual antiplatelet therapy for 3 months.  I spoke with Dr. Man yesterday and I left a message for the patient this evening.  He should make follow-up appointment with me.\par October 29, 2021.  Patient returns in follow-up after his LAD stent as noted above.  EKG here is sinus at 45 with his usual mild ST depressions in leads II, V2 through 5. Feels wonderful since the stent but is also a little anxious ever since realizing that he needs a stent.  Still on Ranexa along with his statin and dual antiplatelet therapy and his Parkinson's medication.  Blood pressure running a little high today probably just from the stress.  Labs will be checked.\par December 17, 2021.  Patient here in follow-up.  Doing well from a cardiac point of view.  Another abdominal hernia popped up but he may need surgery again but I told him to ideally wait 6 months from the time of his stent.  He also needs injections in his knee and possibly hip and may need surgery in the future.  Good spirits with no other symptoms or complaints.  Still on dual antiplatelet therapy.  May be going away in January but a lot of stress with his mother-in-law and her Alzheimer's etc.\par February 14, 2022.  Semiurgent visit as patient just found out he is going to need hip replacement surgery.  Will probably be doing it with Dr. Nick Mackenzie at [facility].  Also discussed his hernia surgery with Dr. Bo to and most likely he will have the hernia surgery first.  For now he is willing to wait a little longer and stay on dual antiplatelet therapy.  No chest pain no shortness of breath no other new issues.  Reviewed the pros and cons of waiting 6 months versus waiting a little last in order to have the surgery.\par March 11, 2022.  Due to his abdominal discomfort he finally scheduled the hernia surgery from March 29.  He has remained stable from a cardiac point of view.  Still on dual antiplatelet therapy.  No chest pain, shortness of breath, melena etc.  Baseline EKG still abnormal with some resting ST depressions but unchanged from previous tracings.  He thinks he will need hip replacement surgery pretty soon after that as well.\par May 13, 2022.  Patient returns after his hernia surgery of March 29.  He remains in sinus bradycardia at 48 and the rest of his EKG is unchanged.  Had a rough time the first few weeks postop because of severe pain but now is doing well.\par July 13, 2022.  Patient came for urgent visit.  Has been feeling dizzy and lightheaded lately and call the office and could not get through so just came over.  Very difficult history to pin down but when trying to be more exact, never truly near syncopal, episodes last at least a minute but usually not more than 2 minutes.  Does occasionally say he just feels a little fuzzy but it is not chronic.  He does feel like taking a nap in the afternoon and has a little more generalized fatigue but that is a different symptom.  Here in the office he was in sinus bradycardia at only 44 but totally asymptomatic.  He is on Ranexa and he is on Parkinson's medications.\par August 9, 2022.  Patient called to say he is scheduled for hip replacement with Dr. Nick Mackenzie on August 17.  He is having presurgical testing at [facility] later this week.  Based on his exam on July 13, if his presurgical lab work is in order I see no contraindication to the upcoming surgery and anesthesia.\par October 12, 2022.  Patient here in follow-up.  Hip surgery went well without complication.  The home physical therapist stopped coming 2 weeks ago so for 2 weeks he did therapy on his own and now is starting to do outpatient physical therapy.  Still sore and slow to get around.  Still complains of dizziness which is mostly when he changes position and it just lasts for a few seconds or so.  Not orthostatic in the office, and when he is walking he does not have fatigue or shortness of breath or dizziness to blame on his low heart rate; as a matter fact his last stress test 1 year ago his resting heart rate was 45 but went up to 111 with a little exercise.  Here today he is resting ECG had a heart rate of 47 and during his vital signs his heart rate was 54.  He may end up needing a knee replacement in the future as well.\par December 16, 2022.  Patient here in follow-up because he mentions some swelling in his right leg ever since his right hip replacement and the doctor said go see a cardiologist.  No chest pain or shortness of breath.  Occasional lightheadedness now and then as he has had in the past.  Left leg has minimal pedal edema in the right leg is the one that had the hip replacement on.  No sudden onset of swelling redness tenderness etc.

## 2022-12-16 NOTE — REVIEW OF SYSTEMS
[Dizziness] : dizziness [Anxiety] : anxiety [Depression] : depression [Negative] : Heme/Lymph [Weight Gain (___ Lbs)] : no recent weight gain [Feeling Fatigued] : not feeling fatigued [Weight Loss (___ Lbs)] : [unfilled] ~Ulb weight loss [Dyspnea on exertion] : not dyspnea during exertion [Chest Discomfort] : no chest discomfort [Lower Ext Edema] : lower extremity edema [Palpitations] : no palpitations [Syncope] : no syncope [Abdominal Pain] : no abdominal pain [Suicidal] : not suicidal [FreeTextEntry5] : see HPI [FreeTextEntry7] : Abdominal hernia [FreeTextEntry9] : s/p hip replacement [de-identified] : Parkinsonism and some orthostasis [de-identified] : Seems a little depressed and anxious

## 2023-01-24 ENCOUNTER — APPOINTMENT (OUTPATIENT)
Dept: INTERNAL MEDICINE | Facility: CLINIC | Age: 78
End: 2023-01-24
Payer: MEDICARE

## 2023-01-24 ENCOUNTER — APPOINTMENT (OUTPATIENT)
Dept: CT IMAGING | Facility: CLINIC | Age: 78
End: 2023-01-24
Payer: MEDICARE

## 2023-01-24 ENCOUNTER — OUTPATIENT (OUTPATIENT)
Dept: OUTPATIENT SERVICES | Facility: HOSPITAL | Age: 78
LOS: 1 days | End: 2023-01-24
Payer: MEDICARE

## 2023-01-24 VITALS
TEMPERATURE: 97.7 F | BODY MASS INDEX: 24.18 KG/M2 | HEIGHT: 69.5 IN | SYSTOLIC BLOOD PRESSURE: 136 MMHG | HEART RATE: 62 BPM | OXYGEN SATURATION: 99 % | WEIGHT: 167 LBS | DIASTOLIC BLOOD PRESSURE: 80 MMHG

## 2023-01-24 DIAGNOSIS — S09.90XA UNSPECIFIED INJURY OF HEAD, INITIAL ENCOUNTER: ICD-10-CM

## 2023-01-24 DIAGNOSIS — Z98.890 OTHER SPECIFIED POSTPROCEDURAL STATES: Chronic | ICD-10-CM

## 2023-01-24 DIAGNOSIS — M43.6 TORTICOLLIS: ICD-10-CM

## 2023-01-24 DIAGNOSIS — Z87.09 PERSONAL HISTORY OF OTHER DISEASES OF THE RESPIRATORY SYSTEM: Chronic | ICD-10-CM

## 2023-01-24 DIAGNOSIS — R51.9 HEADACHE, UNSPECIFIED: ICD-10-CM

## 2023-01-24 PROCEDURE — 70450 CT HEAD/BRAIN W/O DYE: CPT

## 2023-01-24 PROCEDURE — 99214 OFFICE O/P EST MOD 30 MIN: CPT | Mod: 25

## 2023-01-24 PROCEDURE — 70450 CT HEAD/BRAIN W/O DYE: CPT | Mod: 26,MH

## 2023-01-24 NOTE — REVIEW OF SYSTEMS
[Headache] : headache [Negative] : Heme/Lymph [Dizziness] : no dizziness [Fainting] : no fainting [Confusion] : no confusion [Unsteady Walk] : no ataxia [Memory Loss] : no memory loss [FreeTextEntry9] : Neck stiffness

## 2023-01-24 NOTE — HISTORY OF PRESENT ILLNESS
[FreeTextEntry8] : Mr. ECHEVARRIA is a 77 year year old male who presents to the office for an acute visit due to a complaint of headache and neck stiffness x3 days. 2.5 weeks ago, the patient fell out of bed and struck his forehead on a carpeted floor. The patient was dreaming that he was fighting someone and fell out of the bed. He had a mild scrape/laceration to the forehead with mild bleeding that stopped soon after. The patient did not go to the ED because he felt fine. He had no dizziness, headache, confusion or nausea/vomiting after the incident and has been in a normal state of health until 3 days ago when he started to have mild, generalized headaches when lying down associated with intermittent neck stiffness. The patient denies any numbness, tingling, weakness/motor deficits, speech problems and confusion. Of note, the patient was previously on Plavix but stopped it on his own (cardiology did not request the patient stop medication) about 3 months ago.

## 2023-01-30 ENCOUNTER — OUTPATIENT (OUTPATIENT)
Dept: OUTPATIENT SERVICES | Facility: HOSPITAL | Age: 78
LOS: 1 days | Discharge: HOME | End: 2023-01-30

## 2023-01-30 ENCOUNTER — NON-APPOINTMENT (OUTPATIENT)
Age: 78
End: 2023-01-30

## 2023-01-30 ENCOUNTER — APPOINTMENT (OUTPATIENT)
Dept: HEMATOLOGY ONCOLOGY | Facility: CLINIC | Age: 78
End: 2023-01-30

## 2023-01-30 ENCOUNTER — RESULT REVIEW (OUTPATIENT)
Age: 78
End: 2023-01-30

## 2023-01-30 DIAGNOSIS — Z87.09 PERSONAL HISTORY OF OTHER DISEASES OF THE RESPIRATORY SYSTEM: Chronic | ICD-10-CM

## 2023-01-30 DIAGNOSIS — Z98.890 OTHER SPECIFIED POSTPROCEDURAL STATES: Chronic | ICD-10-CM

## 2023-01-30 DIAGNOSIS — D50.9 IRON DEFICIENCY ANEMIA, UNSPECIFIED: ICD-10-CM

## 2023-01-30 LAB
BASOPHILS # BLD AUTO: 0.05 K/UL — SIGNIFICANT CHANGE UP (ref 0–0.2)
BASOPHILS NFR BLD AUTO: 0.8 % — SIGNIFICANT CHANGE UP (ref 0–2)
EOSINOPHIL # BLD AUTO: 0.3 K/UL — SIGNIFICANT CHANGE UP (ref 0–0.5)
EOSINOPHIL NFR BLD AUTO: 4.8 % — SIGNIFICANT CHANGE UP (ref 0–6)
HCT VFR BLD CALC: 38.6 % — LOW (ref 39–50)
HGB BLD-MCNC: 13 G/DL — SIGNIFICANT CHANGE UP (ref 13–17)
IMM GRANULOCYTES NFR BLD AUTO: 0.3 % — SIGNIFICANT CHANGE UP (ref 0–0.9)
LYMPHOCYTES # BLD AUTO: 1.58 K/UL — SIGNIFICANT CHANGE UP (ref 1–3.3)
LYMPHOCYTES # BLD AUTO: 25.2 % — SIGNIFICANT CHANGE UP (ref 13–44)
MCHC RBC-ENTMCNC: 29.6 PG — SIGNIFICANT CHANGE UP (ref 27–34)
MCHC RBC-ENTMCNC: 33.7 G/DL — SIGNIFICANT CHANGE UP (ref 32–36)
MCV RBC AUTO: 87.9 FL — SIGNIFICANT CHANGE UP (ref 80–100)
MONOCYTES # BLD AUTO: 0.62 K/UL — SIGNIFICANT CHANGE UP (ref 0–0.9)
MONOCYTES NFR BLD AUTO: 9.9 % — SIGNIFICANT CHANGE UP (ref 2–14)
NEUTROPHILS # BLD AUTO: 3.69 K/UL — SIGNIFICANT CHANGE UP (ref 1.8–7.4)
NEUTROPHILS NFR BLD AUTO: 59 % — SIGNIFICANT CHANGE UP (ref 43–77)
NRBC # BLD: 0 /100 WBCS — SIGNIFICANT CHANGE UP (ref 0–0)
PLATELET # BLD AUTO: 247 K/UL — SIGNIFICANT CHANGE UP (ref 150–400)
RBC # BLD: 4.39 M/UL — SIGNIFICANT CHANGE UP (ref 4.2–5.8)
RBC # FLD: 14.5 % — SIGNIFICANT CHANGE UP (ref 10.3–14.5)
RETICS #: 56.6 K/UL — SIGNIFICANT CHANGE UP (ref 25–125)
RETICS/RBC NFR: 1.3 % — SIGNIFICANT CHANGE UP (ref 0.5–2.5)
WBC # BLD: 6.26 K/UL — SIGNIFICANT CHANGE UP (ref 3.8–10.5)
WBC # FLD AUTO: 6.26 K/UL — SIGNIFICANT CHANGE UP (ref 3.8–10.5)

## 2023-01-31 ENCOUNTER — NON-APPOINTMENT (OUTPATIENT)
Age: 78
End: 2023-01-31

## 2023-01-31 LAB
FERRITIN SERPL-MCNC: 54 NG/ML
IRON SATN MFR SERPL: 24 %
IRON SERPL-MCNC: 79 UG/DL
TIBC SERPL-MCNC: 324 UG/DL
UIBC SERPL-MCNC: 245 UG/DL

## 2023-02-02 ENCOUNTER — NON-APPOINTMENT (OUTPATIENT)
Age: 78
End: 2023-02-02

## 2023-02-10 ENCOUNTER — APPOINTMENT (OUTPATIENT)
Dept: HEMATOLOGY ONCOLOGY | Facility: CLINIC | Age: 78
End: 2023-02-10
Payer: MEDICARE

## 2023-02-10 VITALS
RESPIRATION RATE: 16 BRPM | DIASTOLIC BLOOD PRESSURE: 77 MMHG | TEMPERATURE: 97.4 F | WEIGHT: 169.76 LBS | HEART RATE: 75 BPM | OXYGEN SATURATION: 97 % | BODY MASS INDEX: 24.58 KG/M2 | HEIGHT: 69.49 IN | SYSTOLIC BLOOD PRESSURE: 119 MMHG

## 2023-02-10 DIAGNOSIS — R42 DIZZINESS AND GIDDINESS: ICD-10-CM

## 2023-02-10 DIAGNOSIS — R00.2 PALPITATIONS: ICD-10-CM

## 2023-02-10 PROCEDURE — 99213 OFFICE O/P EST LOW 20 MIN: CPT

## 2023-02-10 NOTE — ASSESSMENT
[Supportive] : Goals of care discussed with patient: Supportive [Palliative Care Plan] : not applicable at this time [FreeTextEntry1] : MARYBETH Landry is a patient with a history of iron deficiency anemia partially on the basis of a dietary factor. THe HGB is not low enough to account for his symptoms but I would advise him to continue with the oral iron daily. His cholesterol level was discussed with him and he will make dietary adjustments.\par  Overall management of his conditions discussed and he will continue with the physical therapy.\par Patient seen with A Hai MENDOZA\par RTC 4 months\par \par 10/6/2022- Mr. Hai Perales was seen in a follow up visit today after 20 months. Pt w h/o AUBREY self discontinued oral Fe, HTN, HLD, CAD s/p LAD stent on ASA, Plavix, Sinus Node dysfxn, Sinus Bradycardia HR 48 bpm, Parkinson's disease on Sinemet, with recent balance difficulty ambulated with a cane, chronic dizziness, Autonomic Orthostatic Hypotension, Paraesophageal, incisional hernia surgery, Right Total Hip replacement 8/22. \par He denies any bleeding, blood transfusions, IV iron. \par Today's CBC - Anemia without microcytosis - Hgb = 12.1, HCT = 35.7%,  MCV = 85.4, RDW = 12.6%, \par Reticulocyte count, Vitamin B12, Folate  - WNL, \par Ferritin = 36 (trending down), Iron = 43, TIBC = 354, % Fe Saturation = 12%. \par No indication for blood transfusion or IV iron. \par Will recommend to take Slow Fe 2 tabs every other day with OJ, citrus, Vit C. Patient doesn't eat much of red meat due to his cardiac condition. Encouraged to incorporate iron rich vegetables with citrus for better absorption. \par F/u with PCP, Neurologist, Cardiologist at their discretion, and recommendation. \par Medication compliance was advised. CBC results were discussed in person, all questions, concerns were addressed with the patient during this visit to his satisfaction. \par RTC in 4 months.\par Case management, care plan was discussed with Dr. Adalid Wills. \par \par Addendum Note: 10/7/2022 - CBC, Ferritin, Iron studies, Retic count, Vit B12, folate levels were discussed with the patient over the phone. all questions, concerns were addressed during the visit and today's phone call.\par Per pt's request will mail copies of this visit's lab results to his home address. \par \par 2/10/2023 - Pt w h/o h/o AUBREY, HTN, HLD, CAD s/p LAD stent on ASA, Plavix, Sinus Node dysfxn, Sinus Bradycardia HR 48 bpm, Parkinson's disease on Sinemet, with recent balance difficulty ambulated with a cane, chronic dizziness, Autonomic Orthostatic Hypotension, Paraesophageal, incisional hernia surgery, Right Total Hip replacement 8/22 seen in a f/u visit for AUBREY.\par 1) Anemia resolved - Labs  1/30/ 2023 reviewed - discussed with patient - copy of labs provided.  Will recommend to continue Slow Fe 2 tabs every other day with OJ, citrus /  Vit C.\par 2) Recurrent Intermittent Chronic Dizziness - pt requested ENT phone number - has seen ENT in the past - [facility] phone number provided. \par Head CT scan results provided to pt at his request. \par Medication compliance emphasized. Pt advised to speak with physicians prior to discontinuation of medications.\par f/u PCP, Neurologist, Orthopedics, Cardiologist at their discretion. \par RTC in 6 months\par Case management, care plan d/w Dr. Adalid Wills \par

## 2023-02-10 NOTE — REASON FOR VISIT
[Follow-Up Visit] : a follow-up visit for [Blood Count Assessment] : blood count assessment [FreeTextEntry2] : Anemia

## 2023-02-10 NOTE — HISTORY OF PRESENT ILLNESS
[Disease:__________________________] : Disease: [unfilled] [Cardiovascular] : Cardiovascular [Constitutional] : Constitutional [ENT] : ENT [Dermatologic] : Dermatologic [Endocrine] : Endocrine [Gastrointestinal] : Gastrointestinal [Genitourinary] : Genitourinary [Gynecologic] : Gynecologic [Infectious] : Infectious [Musculoskeletal] : Musculoskeletal [Neurologic] : Neurologic [Pain] : Pain [Pulmonary] : Pulmonary [Hematologic] : Hematologic [Date: ____________] : Patient's last distress assessment performed on [unfilled]. [0 - No Distress] : Distress Level: 0 [80: Normal activity with effort; some signs or symptoms of disease.] : 80: Normal activity with effort; some signs or symptoms of disease.  [ECOG Performance Status: 1 - Restricted in physically strenuous activity but ambulatory and able to carry out work of a light or sedentary nature] : Performance Status: 1 - Restricted in physically strenuous activity but ambulatory and able to carry out work of a light or sedentary nature, e.g., light house work, office work [de-identified] : Hai Perales is a 75 year old male presenting to the office for hematologic evaluation. He is referred here by Dr. Kurtis Rsosi (internal medicine). Patient was noted have the following lab values from last month: hgb 6.8, ferritin 6, serum iron 15, iron saturation 4%. He admitted to having a history of iron deficiency anemia for the last 5 years. He admitted to oral iron supplementation but denied eating red meats. He denied receiving blood transfusions and intravenous iron in the past.   [FreeTextEntry1] : iron 65 mg [de-identified] : He has felt fatigue yesterday; some dizziness and no falls. No nausea and no vomiting. He has not had bleeding\par \par 10/06/2022 - Patient seen in a follow up visit after 20 months (last visit was 2/2021). Pt seen for AUBREY was on oral Fe - stated he stopped taking it few months ago. \par He drove himself to this appointment presented to the office by himself. \par  He c/o intermittent chronic dizziness , worsening balance uses cane for ambulation, Denies falls, syncope, bleeding, bruising, blood transfusions, IV iron, fever, chills, night sweats, weight loss. \par He had LAD stent on ASA, Plavix, NTG SL prn, Ranexa. Sinus Bradycardia HR @ 48 bpm followed by per Dr. Ari Celis Cardiologist. No pacemaker placement plans as of yet. \par He takes Sinemet for  Parkinson's followed by Neurologist Dr. Jarad Arana in [place]. He has worsening balance uses a cane for ambulation. \par Since his last visit with us he has undergone Paraesophageal / complex incisional hernia surgery. \par Underwent a Right Total Hip replacement 8/2022. \par Evaluated by Pulm Dr. Olivas for Intractable Hiccups was on Thorazine, then changed to Baclofen by Neurologist. Hiccups have resolved. \par \par 2/10/2023 - Patient seen in a f/u visit today. He fell out of bed in the middle of the night in January 2023 - Head CT was unremarkable for acute findings. \par He reports compliance with medications and f/u with his doctors. He is followed by Neurologist  Dr. Jarad Arana for Parkinson's disease. \par He has self discontinued his Plavix, Atorvastatin because he reports his cholesterol levels were normal - he did not speak with his Cardiologist regarding this discontinuation. 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 \par \par  [FreeTextEntry7] : Pt able to drive, runs errands and attend to his ADLs

## 2023-02-10 NOTE — PHYSICAL EXAM
[Restricted in physically strenuous activity but ambulatory and able to carry out work of a light or sedentary nature] : Status 1- Restricted in physically strenuous activity but ambulatory and able to carry out work of a light or sedentary nature, e.g., light house work, office work [Normal] : affect appropriate [de-identified] : presented to the office by himself, ambulated to the office with a cane,

## 2023-02-10 NOTE — REVIEW OF SYSTEMS
[Dizziness] : dizziness [Difficulty Walking] : difficulty walking [Negative] : Allergic/Immunologic [Fatigue] : no fatigue [FreeTextEntry2] : presented to the office with a cane  [FreeTextEntry3] : wears reading glasses [FreeTextEntry5] : Sinus Bradycardia HR 48 bpm, denies pacemaker  [FreeTextEntry7] : h/o Paraesophageal Incisional Hernia surgery  [FreeTextEntry8] : no hematuria  [FreeTextEntry9] : Right hip replacement 8/2022, chronic Knee pain bilateral sec to OA  [de-identified] : Parkinson's disease uses cane during ambulation sometimes a walker, chronic dizziness, Fell out of bed in the night while sleeping in Jan 2023

## 2023-02-17 ENCOUNTER — NON-APPOINTMENT (OUTPATIENT)
Age: 78
End: 2023-02-17

## 2023-03-24 ENCOUNTER — NON-APPOINTMENT (OUTPATIENT)
Age: 78
End: 2023-03-24

## 2023-05-16 ENCOUNTER — APPOINTMENT (OUTPATIENT)
Dept: INTERNAL MEDICINE | Facility: CLINIC | Age: 78
End: 2023-05-16
Payer: MEDICARE

## 2023-05-16 VITALS
HEART RATE: 70 BPM | HEIGHT: 69.5 IN | WEIGHT: 165 LBS | BODY MASS INDEX: 23.89 KG/M2 | OXYGEN SATURATION: 99 % | TEMPERATURE: 97.1 F

## 2023-05-16 DIAGNOSIS — R73.09 OTHER ABNORMAL GLUCOSE: ICD-10-CM

## 2023-05-16 PROCEDURE — 36415 COLL VENOUS BLD VENIPUNCTURE: CPT

## 2023-05-16 PROCEDURE — 99214 OFFICE O/P EST MOD 30 MIN: CPT | Mod: 25

## 2023-05-17 ENCOUNTER — NON-APPOINTMENT (OUTPATIENT)
Age: 78
End: 2023-05-17

## 2023-05-17 ENCOUNTER — APPOINTMENT (OUTPATIENT)
Dept: CARDIOLOGY | Facility: CLINIC | Age: 78
End: 2023-05-17
Payer: MEDICARE

## 2023-05-17 VITALS
WEIGHT: 169 LBS | HEIGHT: 69.5 IN | OXYGEN SATURATION: 98 % | SYSTOLIC BLOOD PRESSURE: 120 MMHG | DIASTOLIC BLOOD PRESSURE: 80 MMHG | HEART RATE: 51 BPM | BODY MASS INDEX: 24.47 KG/M2

## 2023-05-17 VITALS — SYSTOLIC BLOOD PRESSURE: 118 MMHG | OXYGEN SATURATION: 98 % | HEART RATE: 61 BPM | DIASTOLIC BLOOD PRESSURE: 70 MMHG

## 2023-05-17 VITALS — SYSTOLIC BLOOD PRESSURE: 85 MMHG | DIASTOLIC BLOOD PRESSURE: 60 MMHG

## 2023-05-17 VITALS — DIASTOLIC BLOOD PRESSURE: 70 MMHG | SYSTOLIC BLOOD PRESSURE: 112 MMHG

## 2023-05-17 DIAGNOSIS — R94.31 ABNORMAL ELECTROCARDIOGRAM [ECG] [EKG]: ICD-10-CM

## 2023-05-17 DIAGNOSIS — R00.1 BRADYCARDIA, UNSPECIFIED: ICD-10-CM

## 2023-05-17 PROCEDURE — 99214 OFFICE O/P EST MOD 30 MIN: CPT

## 2023-05-17 PROCEDURE — 93000 ELECTROCARDIOGRAM COMPLETE: CPT

## 2023-05-17 NOTE — ASSESSMENT
[FreeTextEntry1] : The patient complains of dizziness.  It is mostly with going from sitting or lying down to standing.  On exam here now he is orthostatic.  He likely has orthostatic hypotension secondary to Parkinsons.  He is already on a medication, probably Midodrine.  Consider increasing the dose.  He has an appointment with his cardiologist tomorrow.  Will decide regarding medication after that.  Po fluids advised.\par \par He sees his neurologist regularly.\par \par Will also check a CBC (history of anemia), metabolic, TFTs to rule out other issues.\par \par He didn't do the follow-up CT chest which had been advised in 8/22.  He was again advised to have it done and he agrees.

## 2023-05-17 NOTE — REVIEW OF SYSTEMS
[Weight Gain (___ Lbs)] : no recent weight gain [Feeling Fatigued] : not feeling fatigued [Weight Loss (___ Lbs)] : [unfilled] ~Ulb weight loss [Dyspnea on exertion] : not dyspnea during exertion [Chest Discomfort] : no chest discomfort [Lower Ext Edema] : lower extremity edema [Palpitations] : no palpitations [Syncope] : no syncope [Abdominal Pain] : no abdominal pain [Dizziness] : dizziness [Depression] : depression [Anxiety] : anxiety [Suicidal] : not suicidal [Negative] : Heme/Lymph [FreeTextEntry7] : Abdominal hernia [FreeTextEntry5] : see HPI [FreeTextEntry9] : s/p hip replacement [de-identified] : Parkinsonism and some orthostasis [de-identified] : Seems a little depressed and anxious

## 2023-05-17 NOTE — HISTORY OF PRESENT ILLNESS
[FreeTextEntry8] : The patient comes in with episodes of dizziness for about a month. His eyes feel foggy, head fuzzy.  It can be persistent.  It isn't a spinning. No focal weakness, numbness. He doesn't feel like he's going to faint.  He feels it most when he stands up. He feels unsteady.  It might last 1-2 minutes.  No chest pain, dyspnea.  \par \par He was felt to have orthostatic hypotension and he was recently started on a medication for it which he says hasn't helped  much.  He doesn't know the name but it was probably Midodrine 5 mg BID.  \par \par He says the Parkinson's has progressed a little and he feels more unsteady.  He says his memory isn't as good.\par \par He is under a lot of stress.

## 2023-05-17 NOTE — REASON FOR VISIT
[FreeTextEntry1] : The patient is a 77-year-old male with a history of angina with nonobstructive coronary artery disease,  and sinus node dysfunction and iron deficiency anemia here because of chest pain. s/p stent to LAD.\par  s/p hip surgery.  Now here with orthostatic hypotension

## 2023-05-17 NOTE — PHYSICAL EXAM
[General Appearance - Well Developed] : well developed [Normal Appearance] : normal appearance [General Appearance - Well Nourished] : well nourished [Well Groomed] : well groomed [No Deformities] : no deformities [General Appearance - In No Acute Distress] : no acute distress [Normal Conjunctiva] : the conjunctiva exhibited no abnormalities [Normal Oral Mucosa] : normal oral mucosa [Eyelids - No Xanthelasma] : the eyelids demonstrated no xanthelasmas [No Oral Pallor] : no oral pallor [No Oral Cyanosis] : no oral cyanosis [Normal Jugular Venous A Waves Present] : normal jugular venous A waves present [Normal Jugular Venous V Waves Present] : normal jugular venous V waves present [No Jugular Venous Wasserman A Waves] : no jugular venous wasserman A waves [Respiration, Rhythm And Depth] : normal respiratory rhythm and effort [Exaggerated Use Of Accessory Muscles For Inspiration] : no accessory muscle use [Heart Rate And Rhythm] : heart rate and rhythm were normal [Auscultation Breath Sounds / Voice Sounds] : lungs were clear to auscultation bilaterally [Heart Sounds] : normal S1 and S2 [Murmurs] : no murmurs present [Bradycardic ___] : the heart rate was bradycardic at [unfilled] bpm [Regular-Premature Beats] : the rhythm was regular with premature beats [Abdomen Soft] : soft [Abdomen Tenderness] : non-tender [Abdomen Mass (___ Cm)] : no abdominal mass palpated [Abnormal Walk] : normal gait [Gait - Sufficient For Exercise Testing] : the gait was sufficient for exercise testing [Nail Clubbing] : no clubbing of the fingernails [Cyanosis, Localized] : no localized cyanosis [Petechial Hemorrhages (___cm)] : no petechial hemorrhages [Skin Color & Pigmentation] : normal skin color and pigmentation [] : no rash [No Venous Stasis] : no venous stasis [Skin Lesions] : no skin lesions [No Skin Ulcers] : no skin ulcer [No Xanthoma] : no  xanthoma was observed [Oriented To Time, Place, And Person] : oriented to person, place, and time [Affect] : the affect was normal [Mood] : the mood was normal [FreeTextEntry1] : Seems less depressed, and a little anxious.

## 2023-05-17 NOTE — REVIEW OF SYSTEMS
[Lower Ext Edema] : lower extremity edema [Dizziness] : dizziness [Depression] : depression [Anxiety] : anxiety [Negative] : Heme/Lymph [Weight Gain (___ Lbs)] : no recent weight gain [Feeling Fatigued] : not feeling fatigued [Weight Loss (___ Lbs)] : no recent weight loss [Dyspnea on exertion] : not dyspnea during exertion [Chest Discomfort] : no chest discomfort [Palpitations] : no palpitations [Syncope] : no syncope [Abdominal Pain] : no abdominal pain [Suicidal] : not suicidal [FreeTextEntry5] : see HPI [FreeTextEntry7] : Abdominal hernia [FreeTextEntry9] : s/p hip replacement [de-identified] : Seems a little depressed and anxious [de-identified] : Parkinsonism and some orthostasis

## 2023-05-17 NOTE — PHYSICAL EXAM
[Normal] : soft, non-tender, non-distended, no masses palpated, no HSM and normal bowel sounds [de-identified] : Parkinsonian stiffness

## 2023-05-17 NOTE — HISTORY OF PRESENT ILLNESS
PD/Detectives at bedside. [FreeTextEntry1] : In 2012 the patient presented with exertional chest burning. Abnormal stress echocardiogram. Cardiac catheterization revealed nonobstructive coronary disease (only 40% distal LAD). He been maintained on Ranexa with good relief of his symptoms. \par December 6, 2016. The last couple weeks the patient has been getting his chest tightness, primarily at rest when he stressed. He's had to use nitroglycerin at least once a week. He's had no chest discomfort when he walks on the treadmill or exercise in the gym. Patient also feels slightly lightheaded once in a great while. He usually is sitting when it occurs. He does not have it when he is walking or standing.\par Stress echo on December 16 was negative and the echo again showed mild to moderate MR, unchanged.\par December 4, 2017. One year since last visit. Symptoms, maybe once a week, and usually related to emotional upset and stress. Responds to nitroglycerin within 5 minutes. He never increased his Ranexa. He was complaining about the cost of Ranexa and we discussed the rationale for Ranexa versus other medications in his case. Beta blockers must be avoided given his sinus bradycardia. He had labs with Dr. Rossi on September 28. He remains with iron deficiency anemia, but he had a negative GI workup in 2014. Since then he has increased his iron from once a day to twice a day. His lipids had a cholesterol of 147, HDL 70, LDL 63, triglycerides 68, and he remains on atorvastatin. His hemoglobin A1c was 5.5, and the rest of his labs were unremarkable. He had a flu shot with Dr. Rossi.\par April 24, 2018. Patient has recently been noticing that he is getting more short of breath with exertion with some chest pressure. Can barely walk a block or 2. 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 The orthopedist wants to give him a synthetic cartilage injection and he is seeing a vascular doctor later this week because of the swelling in his legs, which is a little more on the right side, where his knee is acting up. He said he walks and feels like an old man. EKG is sinus bradycardia and unchanged. Found to have severe iron deficiency anemia and referred to GI.\par June 7, 2018. The patient returns in followup. Had colonoscopy and endoscopy that were mostly negative. Still considering capsule study. In the meantime, he is on iron twice a day, and his levels have come up. He had been transfused 3 units at the beginning. He still has some orthostatic dizziness at times, but otherwise most of his symptoms are gone. He is getting ready for a trip to [facility]. His EKG remains with sinus bradycardia, and some nonspecific ST changes, but unchanged.\par October 9, 2018. Patient was at hematology on October 5, and a heart rate of 42, was recorded. I was told he had a low heart rate and needed an appointment and he was scheduled for today. Now the patient says when he called he told them he was dizzy and having symptoms, which was not related to me. Here, he is in sinus bradycardia at 46, with mild ST depressions V4 through 6 as well as lead II and aVF. His dizziness is mostly when he changes positions that'll last for a few seconds, and then passes. He is not limited in his exercise capacity and does not quite get near syncopal. No other complaints. Able to walk 20 minutes the other day without stopping.\par November 21, 2019.  First visit in over a year.  Slightly depressed over diagnosis of Parkinson's but so far mild case and doing well.  On Azilect 0.5 twice daily and resagiline 1 mg daily.  Heart rate still slow at 41 but no syncope or near syncope.  Occasionally feels a little dizzy sitting or walking but does not sound like near syncope.  Changed his atorvastatin to every other day on his own but has not had lipids since October of last year with me.  He will have Dr. Rossi added to the blood test when he sees him in a few weeks.  Hemoglobin and hematocrit and iron levels have been holding steady.  No chest pain or shortness of breath.  Considering some experimental options for the Parkinson's going forward.\par December 9, 2019.  Patient called and wanted to come in.  Saw Dr. Sohail Arana of neurology who told him he thinks he needs a pacemaker and that his low heart rate could be explaining some of his Parkinson's symptoms.  I put in a call to Dr. Arana and I am awaiting a response.  In the meantime reviewed the symptoms and the procedure with the patient.  At times he says he does have some lightheadedness and dizziness and wants the pacemaker again at other times is too nervous does not want to stay in hospital overnight and does not think he is having any symptoms different than the last few years.  Certainly no syncope and no definite near syncope.  Heart rate here was 48.  Blood pressure okay.  No recent change in medications.\par July 7, 2020.  Patient here in follow-up.  Remains in sinus bradycardia at 47 with an otherwise normal ECG except incomplete right bundle branch block.  Recent labs with cholesterol 209, HDL 68, .  Normal thyroid function.  Last week he felt dizzy for most of the week and most of the days on and off  somewhat lightheaded but not near syncopal, not vertigo.  This week however he feels perfectly back to himself so not that likely to be from conduction disease.  No other complaints.  He remains on Lipitor and Ranexa.\par August 9, 2021.  Patient here for preop cardiac evaluation due to an abnormal ECG.  Last here over a year ago.  Has been doing well for the most part but has developed mild Parkinson's.  Not having any issues with angina currently but upon pushing the history he does have some orthostatic dizziness now and then.  Even to the point now where he knows to stop and wait a few minutes when he gets out of the car etc. (Actually I had mentioned this in my note over a year ago and that his symptoms were probably orthostasis related to his Parkinson's.)  Here in fact there is a drop in his blood pressure from lying to sitting and is probably related to his parkinsonism.  Otherwise no symptoms or signs of heart failure unstable angina etc.  He always has a mildly abnormal ECG with some ST depressions in the inferior leads and V4 through 6 and the EKG done preop at [place] was not significantly changed from his previous EKGs of over a year ago.  He will be having surgery on both an inguinal and an abdominal hernia by Dr. Bo.\par September 10, 2021.  Patient here for urgent visit.  Called yesterday with a burning chest pain and I was unavailable and was told to speak with or see his internist Dr. Rossi.  He saw Dr. Rossi this morning and EKG was totally unchanged with his usual sinus bradycardia and minimal ST depressions.  He is now here. His surgery and the recovery was a little more difficult. And now for the last week or so he has been having his usual exertional chest burning relieved with rest. It does not seem to be progressive. No signs that he is anemic again but he is more fatigued since the surgery. He also talked about his mild orthostasis which again is more likely related to the Parkinson's. After long discussion we decided that if his troponin is positive but he is stable we will schedule him for an angiogram next week, and if troponin is negative we will try to double up on the Ranexa and consider a stress test or a CT angio of his coronaries.\par September 13, 2021. Spoke with patient.  Troponin negative.  Excellent lipid profile, normal hemoglobin A1c, normal BNP, sodium 131, hematocrit 37.  Patient actually feeling much better and would like to wait until the stress test scheduled at the beginning of October.  Only complaint is vague lightheadedness at times. \par October 6, 2021.  Patient returned for stress echo and follow-up.  He has been taking the Ranexa 1000 twice daily 1 day and 500 twice daily the next alternating for some reason of his own.  Has not really noticed any change in his symptoms.  On the stress test he got his symptoms at 4-1/2 minutes of a low level protocol with his heart rate 80 and there were ST depressions mostly in II and aVF but also in V3 through V6 that persisted in recovery.  He was able to do 10 minutes of the low level protocol to a heart rate of 111.  He got his usual chest burning that increased with exercise and took about 8 or 9 minutes of recovery to go away.  The echocardiogram post exercise was actually normal without wall motion abnormalities.  The resting echo itself had normal LV and RV function with mild to moderate MR and mild TR and was really unchanged from 2016.  After long discussion with patient and his wife we elected to schedule coronary angiography.\par October 13, 2021.Patient had cath yesterday.  70% stenosis in the mid LAD confirmed as significant with a positive IFR.  Stent was placed.  He should remain on dual antiplatelet therapy for 3 months.  I spoke with Dr. Man yesterday and I left a message for the patient this evening.  He should make follow-up appointment with me.\par October 29, 2021.  Patient returns in follow-up after his LAD stent as noted above.  EKG here is sinus at 45 with his usual mild ST depressions in leads II, V2 through 5. Feels wonderful since the stent but is also a little anxious ever since realizing that he needs a stent.  Still on Ranexa along with his statin and dual antiplatelet therapy and his Parkinson's medication.  Blood pressure running a little high today probably just from the stress.  Labs will be checked.\par December 17, 2021.  Patient here in follow-up.  Doing well from a cardiac point of view.  Another abdominal hernia popped up but he may need surgery again but I told him to ideally wait 6 months from the time of his stent.  He also needs injections in his knee and possibly hip and may need surgery in the future.  Good spirits with no other symptoms or complaints.  Still on dual antiplatelet therapy.  May be going away in January but a lot of stress with his mother-in-law and her Alzheimer's etc.\par February 14, 2022.  Semiurgent visit as patient just found out he is going to need hip replacement surgery.  Will probably be doing it with Dr. Nick Mackenzie at [facility].  Also discussed his hernia surgery with Dr. Bo to and most likely he will have the hernia surgery first.  For now he is willing to wait a little longer and stay on dual antiplatelet therapy.  No chest pain no shortness of breath no other new issues.  Reviewed the pros and cons of waiting 6 months versus waiting a little last in order to have the surgery.\par March 11, 2022.  Due to his abdominal discomfort he finally scheduled the hernia surgery from March 29.  He has remained stable from a cardiac point of view.  Still on dual antiplatelet therapy.  No chest pain, shortness of breath, melena etc.  Baseline EKG still abnormal with some resting ST depressions but unchanged from previous tracings.  He thinks he will need hip replacement surgery pretty soon after that as well.\par May 13, 2022.  Patient returns after his hernia surgery of March 29.  He remains in sinus bradycardia at 48 and the rest of his EKG is unchanged.  Had a rough time the first few weeks postop because of severe pain but now is doing well.\par July 13, 2022.  Patient came for urgent visit.  Has been feeling dizzy and lightheaded lately and call the office and could not get through so just came over.  Very difficult history to pin down but when trying to be more exact, never truly near syncopal, episodes last at least a minute but usually not more than 2 minutes.  Does occasionally say he just feels a little fuzzy but it is not chronic.  He does feel like taking a nap in the afternoon and has a little more generalized fatigue but that is a different symptom.  Here in the office he was in sinus bradycardia at only 44 but totally asymptomatic.  He is on Ranexa and he is on Parkinson's medications.\par August 9, 2022.  Patient called to say he is scheduled for hip replacement with Dr. Nick Mackenzie on August 17.  He is having presurgical testing at [facility] later this week.  Based on his exam on July 13, if his presurgical lab work is in order I see no contraindication to the upcoming surgery and anesthesia.\par October 12, 2022.  Patient here in follow-up.  Hip surgery went well without complication.  The home physical therapist stopped coming 2 weeks ago so for 2 weeks he did therapy on his own and now is starting to do outpatient physical therapy.  Still sore and slow to get around.  Still complains of dizziness which is mostly when he changes position and it just lasts for a few seconds or so.  Not orthostatic in the office, and when he is walking he does not have fatigue or shortness of breath or dizziness to blame on his low heart rate; as a matter fact his last stress test 1 year ago his resting heart rate was 45 but went up to 111 with a little exercise.  Here today he is resting ECG had a heart rate of 47 and during his vital signs his heart rate was 54.  He may end up needing a knee replacement in the future as well.\par December 16, 2022.  Patient here in follow-up because he mentions some swelling in his right leg ever since his right hip replacement and the doctor said go see a cardiologist.  No chest pain or shortness of breath.  Occasional lightheadedness now and then as he has had in the past.  Left leg has minimal pedal edema in the right leg is the one that had the hip replacement on.  No sudden onset of swelling redness tenderness etc.\par February 17, 2023.His neurologist Sohail thank called me that the patient is orthostatic with blood pressure dropping from 110-90.  Not really on any medications that should contribute to that.  I agree with the recommendation to start midodrine 5 mg twice a day. \par March 24, 2023.Received a note from Dr. Sohail Arana his neurologist based on the visit March 10, 2023 and then got a message that he will call me today to discuss the patient.  He has some orthostatic symptoms and his blood pressure dropped from 110-90 with standing up.  There is no recording of what his pulse was either supine or standing.  He is not on any cardiac medications that would affect his blood pressure.  This is most likely related to his Parkinson's even though in other areas his Parkinson's is under good control.  I agree that midodrine would be acceptable for the patient and I would avoid Florinef for now. \par May 17, 2023.  Patient here for semiurgent visit complaining of dizziness.  Was already evaluated by Dr. Rossi his internist who noted that the dizziness is clearly with changing positions and did find him to be orthostatic.  He has been on midodrine 5 mg.  He admits he is somewhat better since starting the midodrine which he takes at 12 noon and at 6 PM.  But he is still having episodes and I believe he is more anxious about them then disabled.  EKG here is sinus rhythm at 53 with a left anterior hemiblock and an incomplete right bundle branch block, nonspecific minimal ST changes, unchanged.

## 2023-05-17 NOTE — HISTORY OF PRESENT ILLNESS
[FreeTextEntry1] : In 2012 the patient presented with exertional chest burning. Abnormal stress echocardiogram. Cardiac catheterization revealed nonobstructive coronary disease (only 40% distal LAD). He been maintained on Ranexa with good relief of his symptoms. \par December 6, 2016. The last couple weeks the patient has been getting his chest tightness, primarily at rest when he stressed. He's had to use nitroglycerin at least once a week. He's had no chest discomfort when he walks on the treadmill or exercise in the gym. Patient also feels slightly lightheaded once in a great while. He usually is sitting when it occurs. He does not have it when he is walking or standing.\par Stress echo on December 16 was negative and the echo again showed mild to moderate MR, unchanged.\par December 4, 2017. One year since last visit. Symptoms, maybe once a week, and usually related to emotional upset and stress. Responds to nitroglycerin within 5 minutes. He never increased his Ranexa. He was complaining about the cost of Ranexa and we discussed the rationale for Ranexa versus other medications in his case. Beta blockers must be avoided given his sinus bradycardia. He had labs with Dr. Rossi on September 28. He remains with iron deficiency anemia, but he had a negative GI workup in 2014. Since then he has increased his iron from once a day to twice a day. His lipids had a cholesterol of 147, HDL 70, LDL 63, triglycerides 68, and he remains on atorvastatin. His hemoglobin A1c was 5.5, and the rest of his labs were unremarkable. He had a flu shot with Dr. Rossi.\par April 24, 2018. Patient has recently been noticing that he is getting more short of breath with exertion with some chest pressure. Can barely walk a block or 2. 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 The orthopedist wants to give him a synthetic cartilage injection and he is seeing a vascular doctor later this week because of the swelling in his legs, which is a little more on the right side, where his knee is acting up. He said he walks and feels like an old man. EKG is sinus bradycardia and unchanged. Found to have severe iron deficiency anemia and referred to GI.\par June 7, 2018. The patient returns in followup. Had colonoscopy and endoscopy that were mostly negative. Still considering capsule study. In the meantime, he is on iron twice a day, and his levels have come up. He had been transfused 3 units at the beginning. He still has some orthostatic dizziness at times, but otherwise most of his symptoms are gone. He is getting ready for a trip to [facility]. His EKG remains with sinus bradycardia, and some nonspecific ST changes, but unchanged.\par October 9, 2018. Patient was at hematology on October 5, and a heart rate of 42, was recorded. I was told he had a low heart rate and needed an appointment and he was scheduled for today. Now the patient says when he called he told them he was dizzy and having symptoms, which was not related to me. Here, he is in sinus bradycardia at 46, with mild ST depressions V4 through 6 as well as lead II and aVF. His dizziness is mostly when he changes positions that'll last for a few seconds, and then passes. He is not limited in his exercise capacity and does not quite get near syncopal. No other complaints. Able to walk 20 minutes the other day without stopping.\par November 21, 2019.  First visit in over a year.  Slightly depressed over diagnosis of Parkinson's but so far mild case and doing well.  On Azilect 0.5 twice daily and resagiline 1 mg daily.  Heart rate still slow at 41 but no syncope or near syncope.  Occasionally feels a little dizzy sitting or walking but does not sound like near syncope.  Changed his atorvastatin to every other day on his own but has not had lipids since October of last year with me.  He will have Dr. Rossi added to the blood test when he sees him in a few weeks.  Hemoglobin and hematocrit and iron levels have been holding steady.  No chest pain or shortness of breath.  Considering some experimental options for the Parkinson's going forward.\par December 9, 2019.  Patient called and wanted to come in.  Saw Dr. Sohail Arana of neurology who told him he thinks he needs a pacemaker and that his low heart rate could be explaining some of his Parkinson's symptoms.  I put in a call to Dr. Arana and I am awaiting a response.  In the meantime reviewed the symptoms and the procedure with the patient.  At times he says he does have some lightheadedness and dizziness and wants the pacemaker again at other times is too nervous does not want to stay in hospital overnight and does not think he is having any symptoms different than the last few years.  Certainly no syncope and no definite near syncope.  Heart rate here was 48.  Blood pressure okay.  No recent change in medications.\par July 7, 2020.  Patient here in follow-up.  Remains in sinus bradycardia at 47 with an otherwise normal ECG except incomplete right bundle branch block.  Recent labs with cholesterol 209, HDL 68, .  Normal thyroid function.  Last week he felt dizzy for most of the week and most of the days on and off  somewhat lightheaded but not near syncopal, not vertigo.  This week however he feels perfectly back to himself so not that likely to be from conduction disease.  No other complaints.  He remains on Lipitor and Ranexa.\par August 9, 2021.  Patient here for preop cardiac evaluation due to an abnormal ECG.  Last here over a year ago.  Has been doing well for the most part but has developed mild Parkinson's.  Not having any issues with angina currently but upon pushing the history he does have some orthostatic dizziness now and then.  Even to the point now where he knows to stop and wait a few minutes when he gets out of the car etc. (Actually I had mentioned this in my note over a year ago and that his symptoms were probably orthostasis related to his Parkinson's.)  Here in fact there is a drop in his blood pressure from lying to sitting and is probably related to his parkinsonism.  Otherwise no symptoms or signs of heart failure unstable angina etc.  He always has a mildly abnormal ECG with some ST depressions in the inferior leads and V4 through 6 and the EKG done preop at [facility] was not significantly changed from his previous EKGs of over a year ago.  He will be having surgery on both an inguinal and an abdominal hernia by Dr. Bo.\par September 10, 2021.  Patient here for urgent visit.  Called yesterday with a burning chest pain and I was unavailable and was told to speak with or see his internist Dr. Rossi.  He saw Dr. Rossi this morning and EKG was totally unchanged with his usual sinus bradycardia and minimal ST depressions.  He is now here. His surgery and the recovery was a little more difficult. And now for the last week or so he has been having his usual exertional chest burning relieved with rest. It does not seem to be progressive. No signs that he is anemic again but he is more fatigued since the surgery. He also talked about his mild orthostasis which again is more likely related to the Parkinson's. After long discussion we decided that if his troponin is positive but he is stable we will schedule him for an angiogram next week, and if troponin is negative we will try to double up on the Ranexa and consider a stress test or a CT angio of his coronaries.\par September 13, 2021. Spoke with patient.  Troponin negative.  Excellent lipid profile, normal hemoglobin A1c, normal BNP, sodium 131, hematocrit 37.  Patient actually feeling much better and would like to wait until the stress test scheduled at the beginning of October.  Only complaint is vague lightheadedness at times. \par October 6, 2021.  Patient returned for stress echo and follow-up.  He has been taking the Ranexa 1000 twice daily 1 day and 500 twice daily the next alternating for some reason of his own.  Has not really noticed any change in his symptoms.  On the stress test he got his symptoms at 4-1/2 minutes of a low level protocol with his heart rate 80 and there were ST depressions mostly in II and aVF but also in V3 through V6 that persisted in recovery.  He was able to do 10 minutes of the low level protocol to a heart rate of 111.  He got his usual chest burning that increased with exercise and took about 8 or 9 minutes of recovery to go away.  The echocardiogram post exercise was actually normal without wall motion abnormalities.  The resting echo itself had normal LV and RV function with mild to moderate MR and mild TR and was really unchanged from 2016.  After long discussion with patient and his wife we elected to schedule coronary angiography.\par October 13, 2021.Patient had cath yesterday.  70% stenosis in the mid LAD confirmed as significant with a positive IFR.  Stent was placed.  He should remain on dual antiplatelet therapy for 3 months.  I spoke with Dr. Man yesterday and I left a message for the patient this evening.  He should make follow-up appointment with me.\par October 29, 2021.  Patient returns in follow-up after his LAD stent as noted above.  EKG here is sinus at 45 with his usual mild ST depressions in leads II, V2 through 5. Feels wonderful since the stent but is also a little anxious ever since realizing that he needs a stent.  Still on Ranexa along with his statin and dual antiplatelet therapy and his Parkinson's medication.  Blood pressure running a little high today probably just from the stress.  Labs will be checked.\par December 17, 2021.  Patient here in follow-up.  Doing well from a cardiac point of view.  Another abdominal hernia popped up but he may need surgery again but I told him to ideally wait 6 months from the time of his stent.  He also needs injections in his knee and possibly hip and may need surgery in the future.  Good spirits with no other symptoms or complaints.  Still on dual antiplatelet therapy.  May be going away in January but a lot of stress with his mother-in-law and her Alzheimer's etc.\par February 14, 2022.  Semiurgent visit as patient just found out he is going to need hip replacement surgery.  Will probably be doing it with Dr. Nick Mackenzie at [facility].  Also discussed his hernia surgery with Dr. Bo to and most likely he will have the hernia surgery first.  For now he is willing to wait a little longer and stay on dual antiplatelet therapy.  No chest pain no shortness of breath no other new issues.  Reviewed the pros and cons of waiting 6 months versus waiting a little last in order to have the surgery.\par March 11, 2022.  Due to his abdominal discomfort he finally scheduled the hernia surgery from March 29.  He has remained stable from a cardiac point of view.  Still on dual antiplatelet therapy.  No chest pain, shortness of breath, melena etc.  Baseline EKG still abnormal with some resting ST depressions but unchanged from previous tracings.  He thinks he will need hip replacement surgery pretty soon after that as well.\par May 13, 2022.  Patient returns after his hernia surgery of March 29.  He remains in sinus bradycardia at 48 and the rest of his EKG is unchanged.  Had a rough time the first few weeks postop because of severe pain but now is doing well.\par July 13, 2022.  Patient came for urgent visit.  Has been feeling dizzy and lightheaded lately and call the office and could not get through so just came over.  Very difficult history to pin down but when trying to be more exact, never truly near syncopal, episodes last at least a minute but usually not more than 2 minutes.  Does occasionally say he just feels a little fuzzy but it is not chronic.  He does feel like taking a nap in the afternoon and has a little more generalized fatigue but that is a different symptom.  Here in the office he was in sinus bradycardia at only 44 but totally asymptomatic.  He is on Ranexa and he is on Parkinson's medications.\par August 9, 2022.  Patient called to say he is scheduled for hip replacement with Dr. Nick Mackenzie on August 17.  He is having presurgical testing at [facility] later this week.  Based on his exam on July 13, if his presurgical lab work is in order I see no contraindication to the upcoming surgery and anesthesia.\par October 12, 2022.  Patient here in follow-up.  Hip surgery went well without complication.  The home physical therapist stopped coming 2 weeks ago so for 2 weeks he did therapy on his own and now is starting to do outpatient physical therapy.  Still sore and slow to get around.  Still complains of dizziness which is mostly when he changes position and it just lasts for a few seconds or so.  Not orthostatic in the office, and when he is walking he does not have fatigue or shortness of breath or dizziness to blame on his low heart rate; as a matter fact his last stress test 1 year ago his resting heart rate was 45 but went up to 111 with a little exercise.  Here today he is resting ECG had a heart rate of 47 and during his vital signs his heart rate was 54.  He may end up needing a knee replacement in the future as well.\par December 16, 2022.  Patient here in follow-up because he mentions some swelling in his right leg ever since his right hip replacement and the doctor said go see a cardiologist.  No chest pain or shortness of breath.  Occasional lightheadedness now and then as he has had in the past.  Left leg has minimal pedal edema in the right leg is the one that had the hip replacement on.  No sudden onset of swelling redness tenderness etc.

## 2023-05-19 ENCOUNTER — NON-APPOINTMENT (OUTPATIENT)
Age: 78
End: 2023-05-19

## 2023-05-19 ENCOUNTER — RX RENEWAL (OUTPATIENT)
Age: 78
End: 2023-05-19

## 2023-05-19 LAB
ALBUMIN SERPL ELPH-MCNC: 4.4 G/DL
ALP BLD-CCNC: 75 U/L
ALT SERPL-CCNC: 12 U/L
ANION GAP SERPL CALC-SCNC: 11 MMOL/L
AST SERPL-CCNC: 17 U/L
BASOPHILS # BLD AUTO: 0.05 K/UL
BASOPHILS NFR BLD AUTO: 1 %
BILIRUB SERPL-MCNC: 0.6 MG/DL
BUN SERPL-MCNC: 15 MG/DL
CALCIUM SERPL-MCNC: 9.1 MG/DL
CHLORIDE SERPL-SCNC: 99 MMOL/L
CHOLEST SERPL-MCNC: 246 MG/DL
CO2 SERPL-SCNC: 24 MMOL/L
CREAT SERPL-MCNC: 0.92 MG/DL
EGFR: 87 ML/MIN/1.73M2
EOSINOPHIL # BLD AUTO: 0.4 K/UL
EOSINOPHIL NFR BLD AUTO: 7.6 %
ESTIMATED AVERAGE GLUCOSE: 123 MG/DL
FERRITIN SERPL-MCNC: 69 NG/ML
GLUCOSE SERPL-MCNC: 126 MG/DL
HBA1C MFR BLD HPLC: 5.9 %
HCT VFR BLD CALC: 40.7 %
HDLC SERPL-MCNC: 72 MG/DL
HGB BLD-MCNC: 13.5 G/DL
IMM GRANULOCYTES NFR BLD AUTO: 0.4 %
IRON SATN MFR SERPL: 34 %
IRON SERPL-MCNC: 103 UG/DL
LDLC SERPL CALC-MCNC: 149 MG/DL
LYMPHOCYTES # BLD AUTO: 1.17 K/UL
LYMPHOCYTES NFR BLD AUTO: 22.2 %
MAN DIFF?: NORMAL
MCHC RBC-ENTMCNC: 30.1 PG
MCHC RBC-ENTMCNC: 33.2 GM/DL
MCV RBC AUTO: 90.6 FL
MONOCYTES # BLD AUTO: 0.38 K/UL
MONOCYTES NFR BLD AUTO: 7.2 %
NEUTROPHILS # BLD AUTO: 3.24 K/UL
NEUTROPHILS NFR BLD AUTO: 61.6 %
NONHDLC SERPL-MCNC: 174 MG/DL
PLATELET # BLD AUTO: 255 K/UL
POTASSIUM SERPL-SCNC: 4.6 MMOL/L
PROT SERPL-MCNC: 6.6 G/DL
PSA SERPL-MCNC: 0.32 NG/ML
RBC # BLD: 4.49 M/UL
RBC # FLD: 12.9 %
SODIUM SERPL-SCNC: 134 MMOL/L
T4 FREE SERPL-MCNC: 0.9 NG/DL
TIBC SERPL-MCNC: 307 UG/DL
TRIGL SERPL-MCNC: 127 MG/DL
TSH SERPL-ACNC: 1.59 UIU/ML
UIBC SERPL-MCNC: 204 UG/DL
VIT B12 SERPL-MCNC: 424 PG/ML
WBC # FLD AUTO: 5.26 K/UL

## 2023-05-19 RX ORDER — CLOPIDOGREL BISULFATE 75 MG/1
75 TABLET, FILM COATED ORAL
Qty: 90 | Refills: 0 | Status: ACTIVE | COMMUNITY
Start: 2021-10-29 | End: 1900-01-01

## 2023-05-20 ENCOUNTER — NON-APPOINTMENT (OUTPATIENT)
Age: 78
End: 2023-05-20

## 2023-05-20 LAB
APPEARANCE: CLEAR
BACTERIA: NEGATIVE /HPF
BILIRUBIN URINE: NEGATIVE
BLOOD URINE: NEGATIVE
CAST: 0 /LPF
COLOR: NORMAL
EPITHELIAL CELLS: 0 /HPF
GLUCOSE QUALITATIVE U: NEGATIVE MG/DL
KETONES URINE: ABNORMAL MG/DL
LEUKOCYTE ESTERASE URINE: ABNORMAL
MICROSCOPIC-UA: NORMAL
NITRITE URINE: NEGATIVE
PH URINE: 6
PROTEIN URINE: NEGATIVE MG/DL
RED BLOOD CELLS URINE: 2 /HPF
SPECIFIC GRAVITY URINE: 1.02
UROBILINOGEN URINE: 1 MG/DL
WHITE BLOOD CELLS URINE: 0 /HPF

## 2023-05-28 ENCOUNTER — NON-APPOINTMENT (OUTPATIENT)
Age: 78
End: 2023-05-28

## 2023-05-28 LAB — BACTERIA UR CULT: ABNORMAL

## 2023-06-09 ENCOUNTER — APPOINTMENT (OUTPATIENT)
Dept: INTERNAL MEDICINE | Facility: CLINIC | Age: 78
End: 2023-06-09
Payer: MEDICARE

## 2023-06-09 VITALS
BODY MASS INDEX: 24.61 KG/M2 | OXYGEN SATURATION: 98 % | TEMPERATURE: 97.2 F | HEART RATE: 62 BPM | HEIGHT: 69.5 IN | WEIGHT: 170 LBS

## 2023-06-09 VITALS — SYSTOLIC BLOOD PRESSURE: 95 MMHG | DIASTOLIC BLOOD PRESSURE: 78 MMHG

## 2023-06-09 DIAGNOSIS — I83.90 ASYMPTOMATIC VARICOSE VEINS OF UNSPECIFIED LOWER EXTREMITY: ICD-10-CM

## 2023-06-09 PROCEDURE — 99214 OFFICE O/P EST MOD 30 MIN: CPT | Mod: 25

## 2023-06-09 NOTE — ASSESSMENT
[FreeTextEntry1] : The blood pressure is improved on Midodrine 10 mg BID and he is not orthostatic by symptoms today.  He still is somewhat orthostatic on exam.  Will keep meds the same since he is better symptomatically.  Po fluids advised and he was reminded to stand up slowly and always have something to hold onto when he gets up.  \par \par He can return to his surgeon for the discomfort at the hernia site.\par \par He can see his orthopedist for the LBP.\par \par He can see a vascular surgeon for the symptomatic varicose veins.\par \par He is taking the statin.\par \par Call PRN.

## 2023-06-09 NOTE — PHYSICAL EXAM
[Normal] : soft, non-tender, non-distended, no masses palpated, no HSM and normal bowel sounds [de-identified] : +left varicose veins.

## 2023-06-09 NOTE — HISTORY OF PRESENT ILLNESS
[de-identified] : He is feeling better with less dizziness.  He can still feel off balance on his feet and he must be careful.\par \par He has left LBP which can radiate to the abdomen.\par \par He can have discomfort at the site of the hernia repair.\par \par He has left leg varicose veins which are painful.  No swelling or redness.

## 2023-06-16 ENCOUNTER — APPOINTMENT (OUTPATIENT)
Dept: CARDIOLOGY | Facility: CLINIC | Age: 78
End: 2023-06-16
Payer: MEDICARE

## 2023-06-16 VITALS
SYSTOLIC BLOOD PRESSURE: 128 MMHG | WEIGHT: 172 LBS | OXYGEN SATURATION: 97 % | HEART RATE: 60 BPM | DIASTOLIC BLOOD PRESSURE: 80 MMHG | BODY MASS INDEX: 25.04 KG/M2

## 2023-06-16 VITALS — DIASTOLIC BLOOD PRESSURE: 78 MMHG | SYSTOLIC BLOOD PRESSURE: 110 MMHG

## 2023-06-16 VITALS — SYSTOLIC BLOOD PRESSURE: 102 MMHG | DIASTOLIC BLOOD PRESSURE: 68 MMHG

## 2023-06-16 PROCEDURE — 99214 OFFICE O/P EST MOD 30 MIN: CPT

## 2023-06-16 NOTE — REVIEW OF SYSTEMS
[Lower Ext Edema] : lower extremity edema [Dizziness] : dizziness [Depression] : depression [Negative] : Heme/Lymph [Weight Gain (___ Lbs)] : [unfilled] ~Ulb weight gain [Feeling Fatigued] : not feeling fatigued [Weight Loss (___ Lbs)] : no recent weight loss [Dyspnea on exertion] : not dyspnea during exertion [Chest Discomfort] : no chest discomfort [Palpitations] : no palpitations [Syncope] : no syncope [Abdominal Pain] : no abdominal pain [Anxiety] : no anxiety [Suicidal] : not suicidal [FreeTextEntry5] : see HPI.  Bed varicose veins especially left leg [FreeTextEntry7] : Abdominal hernia [FreeTextEntry9] : s/p hip replacement [de-identified] : Parkinsonism and some orthostasis [de-identified] : Seems a little depressed, less anxious

## 2023-06-16 NOTE — HISTORY OF PRESENT ILLNESS
[FreeTextEntry1] : In  the patient presented with exertional chest burning. Abnormal stress echocardiogram. Cardiac catheterization revealed nonobstructive coronary disease (only 40% distal LAD). He been maintained on Ranexa with good relief of his symptoms. \par 2016. The last couple weeks the patient has been getting his chest tightness, primarily at rest when he stressed. He's had to use nitroglycerin at least once a week. He's had no chest discomfort when he walks on the treadmill or exercise in the gym. Patient also feels slightly lightheaded once in a great while. He usually is sitting when it occurs. He does not have it when he is walking or standing.\par Stress echo on  was negative and the echo again showed mild to moderate MR, unchanged.\par 2017. One year since last visit. Symptoms, maybe once a week, and usually related to emotional upset and stress. Responds to nitroglycerin within 5 minutes. He never increased his Ranexa. He was complaining about the cost of Ranexa and we discussed the rationale for Ranexa versus other medications in his case. Beta blockers must be avoided given his sinus bradycardia. He had labs with Dr. Rossi on . He remains with iron deficiency anemia, but he had a negative GI workup in . Since then he has increased his iron from once a day to twice a day. His lipids had a cholesterol of 147, HDL 70, LDL 63, triglycerides 68, and he remains on atorvastatin. His hemoglobin A1c was 5.5, and the rest of his labs were unremarkable. He had a flu shot with Dr. Rossi.\par 2018. Patient has recently been noticing that he is getting more short of breath with exertion with some chest pressure. Can barely walk a block or 2. 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 The orthopedist wants to give him a synthetic cartilage injection and he is seeing a vascular doctor later this week because of the swelling in his legs, which is a little more on the right side, where his knee is acting up. He said he walks and feels like an old man. EKG is sinus bradycardia and unchanged. Found to have severe iron deficiency anemia and referred to GI.\par 2018. The patient returns in followup. Had colonoscopy and endoscopy that were mostly negative. Still considering capsule study. In the meantime, he is on iron twice a day, and his levels have come up. He had been transfused 3 units at the beginning. He still has some orthostatic dizziness at times, but otherwise most of his symptoms are gone. He is getting ready for a trip to [facility]. His EKG remains with sinus bradycardia, and some nonspecific ST changes, but unchanged.\par 2018. Patient was at hematology on , and a heart rate of 42, was recorded. I was told he had a low heart rate and needed an appointment and he was scheduled for today. Now the patient says when he called he told them he was dizzy and having symptoms, which was not related to me. Here, he is in sinus bradycardia at 46, with mild ST depressions V4 through 6 as well as lead II and aVF. His dizziness is mostly when he changes positions that'll last for a few seconds, and then passes. He is not limited in his exercise capacity and does not quite get near syncopal. No other complaints. Able to walk 20 minutes the other day without stopping.\par 2019.  First visit in over a year.  Slightly depressed over diagnosis of Parkinson's but so far mild case and doing well.  On Azilect 0.5 twice daily and resagiline 1 mg daily.  Heart rate still slow at 41 but no syncope or near syncope.  Occasionally feels a little dizzy sitting or walking but does not sound like near syncope.  Changed his atorvastatin to every other day on his own but has not had lipids since October of last year with me.  He will have Dr. Rossi added to the blood test when he sees him in a few weeks.  Hemoglobin and hematocrit and iron levels have been holding steady.  No chest pain or shortness of breath.  Considering some experimental options for the Parkinson's going forward.\par 2019.  Patient called and wanted to come in.  Saw Dr. Sohail Arana of neurology who told him he thinks he needs a pacemaker and that his low heart rate could be explaining some of his Parkinson's symptoms.  I put in a call to Dr. Arana and I am awaiting a response.  In the meantime reviewed the symptoms and the procedure with the patient.  At times he says he does have some lightheadedness and dizziness and wants the pacemaker again at other times is too nervous does not want to stay in hospital overnight and does not think he is having any symptoms different than the last few years.  Certainly no syncope and no definite near syncope.  Heart rate here was 48.  Blood pressure okay.  No recent change in medications.\par 2020.  Patient here in follow-up.  Remains in sinus bradycardia at 47 with an otherwise normal ECG except incomplete right bundle branch block.  Recent labs with cholesterol 209, HDL 68, .  Normal thyroid function.  Last week he felt dizzy for most of the week and most of the days on and off  somewhat lightheaded but not near syncopal, not vertigo.  This week however he feels perfectly back to himself so not that likely to be from conduction disease.  No other complaints.  He remains on Lipitor and Ranexa.\par 2021.  Patient here for preop cardiac evaluation due to an abnormal ECG.  Last here over a year ago.  Has been doing well for the most part but has developed mild Parkinson's.  Not having any issues with angina currently but upon pushing the history he does have some orthostatic dizziness now and then.  Even to the point now where he knows to stop and wait a few minutes when he gets out of the car etc. (Actually I had mentioned this in my note over a year ago and that his symptoms were probably orthostasis related to his Parkinson's.)  Here in fact there is a drop in his blood pressure from lying to sitting and is probably related to his parkinsonism.  Otherwise no symptoms or signs of heart failure unstable angina etc.  He always has a mildly abnormal ECG with some ST depressions in the inferior leads and V4 through 6 and the EKG done preop at [facility] was not significantly changed from his previous EKGs of over a year ago.  He will be having surgery on both an inguinal and an abdominal hernia by Dr. Bo.\par September 10, 2021.  Patient here for urgent visit.  Called yesterday with a burning chest pain and I was unavailable and was told to speak with or see his internist Dr. Rossi.  He saw Dr. Rossi this morning and EKG was totally unchanged with his usual sinus bradycardia and minimal ST depressions.  He is now here. His surgery and the recovery was a little more difficult. And now for the last week or so he has been having his usual exertional chest burning relieved with rest. It does not seem to be progressive. No signs that he is anemic again but he is more fatigued since the surgery. He also talked about his mild orthostasis which again is more likely related to the Parkinson's. After long discussion we decided that if his troponin is positive but he is stable we will schedule him for an angiogram next week, and if troponin is negative we will try to double up on the Ranexa and consider a stress test or a CT angio of his coronaries.\par 2021. Spoke with patient.  Troponin negative.  Excellent lipid profile, normal hemoglobin A1c, normal BNP, sodium 131, hematocrit 37.  Patient actually feeling much better and would like to wait until the stress test scheduled at the beginning of October.  Only complaint is vague lightheadedness at times. \par 2021.  Patient returned for stress echo and follow-up.  He has been taking the Ranexa 1000 twice daily 1 day and 500 twice daily the next alternating for some reason of his own.  Has not really noticed any change in his symptoms.  On the stress test he got his symptoms at 4-1/2 minutes of a low level protocol with his heart rate 80 and there were ST depressions mostly in II and aVF but also in V3 through V6 that persisted in recovery.  He was able to do 10 minutes of the low level protocol to a heart rate of 111.  He got his usual chest burning that increased with exercise and took about 8 or 9 minutes of recovery to go away.  The echocardiogram post exercise was actually normal without wall motion abnormalities.  The resting echo itself had normal LV and RV function with mild to moderate MR and mild TR and was really unchanged from 2016.  After long discussion with patient and his wife we elected to schedule coronary angiography.\par 2021.Patient had cath yesterday.  70% stenosis in the mid LAD confirmed as significant with a positive IFR.  Stent was placed.  He should remain on dual antiplatelet therapy for 3 months.  I spoke with Dr. Man yesterday and I left a message for the patient this evening.  He should make follow-up appointment with me.\par 2021.  Patient returns in follow-up after his LAD stent as noted above.  EKG here is sinus at 45 with his usual mild ST depressions in leads II, V2 through 5. Feels wonderful since the stent but is also a little anxious ever since realizing that he needs a stent.  Still on Ranexa along with his statin and dual antiplatelet therapy and his Parkinson's medication.  Blood pressure running a little high today probably just from the stress.  Labs will be checked.\par 2021.  Patient here in follow-up.  Doing well from a cardiac point of view.  Another abdominal hernia popped up but he may need surgery again but I told him to ideally wait 6 months from the time of his stent.  He also needs injections in his knee and possibly hip and may need surgery in the future.  Good spirits with no other symptoms or complaints.  Still on dual antiplatelet therapy.  May be going away in January but a lot of stress with his mother-in-law and her Alzheimer's etc.\par 2022.  Semiurgent visit as patient just found out he is going to need hip replacement surgery.  Will probably be doing it with Dr. Nick Mackenzie at [facility].  Also discussed his hernia surgery with Dr. Bo to and most likely he will have the hernia surgery first.  For now he is willing to wait a little longer and stay on dual antiplatelet therapy.  No chest pain no shortness of breath no other new issues.  Reviewed the pros and cons of waiting 6 months versus waiting a little last in order to have the surgery.\par 2022.  Due to his abdominal discomfort he finally scheduled the hernia surgery from .  He has remained stable from a cardiac point of view.  Still on dual antiplatelet therapy.  No chest pain, shortness of breath, melena etc.  Baseline EKG still abnormal with some resting ST depressions but unchanged from previous tracings.  He thinks he will need hip replacement surgery pretty soon after that as well.\par May 13, 2022.  Patient returns after his hernia surgery of .  He remains in sinus bradycardia at 48 and the rest of his EKG is unchanged.  Had a rough time the first few weeks postop because of severe pain but now is doing well.\par 2022.  Patient came for urgent visit.  Has been feeling dizzy and lightheaded lately and call the office and could not get through so just came over.  Very difficult history to pin down but when trying to be more exact, never truly near syncopal, episodes last at least a minute but usually not more than 2 minutes.  Does occasionally say he just feels a little fuzzy but it is not chronic.  He does feel like taking a nap in the afternoon and has a little more generalized fatigue but that is a different symptom.  Here in the office he was in sinus bradycardia at only 44 but totally asymptomatic.  He is on Ranexa and he is on Parkinson's medications.\par 2022.  Patient called to say he is scheduled for hip replacement with Dr. Nick Mackenzie on .  He is having presurgical testing at [facility] later this week.  Based on his exam on , if his presurgical lab work is in order I see no contraindication to the upcoming surgery and anesthesia.\par 2022.  Patient here in follow-up.  Hip surgery went well without complication.  The home physical therapist stopped coming 2 weeks ago so for 2 weeks he did therapy on his own and now is starting to do outpatient physical therapy.  Still sore and slow to get around.  Still complains of dizziness which is mostly when he changes position and it just lasts for a few seconds or so.  Not orthostatic in the office, and when he is walking he does not have fatigue or shortness of breath or dizziness to blame on his low heart rate; as a matter fact his last stress test 1 year ago his resting heart rate was 45 but went up to 111 with a little exercise.  Here today he is resting ECG had a heart rate of 47 and during his vital signs his heart rate was 54.  He may end up needing a knee replacement in the future as well.\par 2022.  Patient here in follow-up because he mentions some swelling in his right leg ever since his right hip replacement and the doctor said go see a cardiologist.  No chest pain or shortness of breath.  Occasional lightheadedness now and then as he has had in the past.  Left leg has minimal pedal edema in the right leg is the one that had the hip replacement on.  No sudden onset of swelling redness tenderness etc.\par 2023.His neurologist Sohail thank called me that the patient is orthostatic with blood pressure dropping from 110-90.  Not really on any medications that should contribute to that.  I agree with the recommendation to start midodrine 5 mg twice a day. \par 2023.Received a note from Dr. Sohail Arana his neurologist based on the visit March 10, 2023 and then got a message that he will call me today to discuss the patient.  He has some orthostatic symptoms and his blood pressure dropped from 110-90 with standing up.  There is no recording of what his pulse was either supine or standing.  He is not on any cardiac medications that would affect his blood pressure.  This is most likely related to his Parkinson's even though in other areas his Parkinson's is under good control.  I agree that midodrine would be acceptable for the patient and I would avoid Florinef for now. \par May 17, 2023.  Patient here for semiurgent visit complaining of dizziness.  Was already evaluated by Dr. Rossi his internist who noted that the dizziness is clearly with changing positions and did find him to be orthostatic.  He has been on midodrine 5 mg.  He admits he is somewhat better since starting the midodrine which he takes at 12 noon and at 6 PM.  But he is still having episodes and I believe he is more anxious about them then disabled.  EKG here is sinus rhythm at 53 with a left anterior hemiblock and an incomplete right bundle branch block, nonspecific minimal ST changes, unchanged.\par 2023.  Patient returns in follow-up.  Last visit after speaking with Dr. Sohail Arana we went up to midodrine 10 mg twice a day.  Here now complaining of dizziness, complaining of other issues but mostly very depressed.  He had 2 friends who  of pancreatic cancer 1 just this past week.  Initially did not want to go up on his midodrine but now says he might be willing to.  Meanwhile he had labs with Dr. Rossi and his cholesterol was very high and he says now he is going to go back on the atorvastatin needing he stopped it on his own.  He complains he has too many pills that may make him nauseous etc.  No exertional chest pain or shortness of breath.  He even wanted to stop the Plavix and I told him he has to stay on either Plavix or aspirin and the Plavix is superior to aspirin.  No chest pain shortness of breath etc.  Mildly orthostatic as can be seen by the vital signs but not terrible but he does feel dizzy when he sits up

## 2023-06-26 ENCOUNTER — RX RENEWAL (OUTPATIENT)
Age: 78
End: 2023-06-26

## 2023-06-28 ENCOUNTER — APPOINTMENT (OUTPATIENT)
Dept: INTERNAL MEDICINE | Facility: CLINIC | Age: 78
End: 2023-06-28

## 2023-07-24 ENCOUNTER — APPOINTMENT (OUTPATIENT)
Dept: INTERNAL MEDICINE | Facility: CLINIC | Age: 78
End: 2023-07-24
Payer: MEDICARE

## 2023-07-24 VITALS
HEIGHT: 69.5 IN | WEIGHT: 170 LBS | OXYGEN SATURATION: 98 % | TEMPERATURE: 97.2 F | BODY MASS INDEX: 24.61 KG/M2 | HEART RATE: 60 BPM

## 2023-07-24 DIAGNOSIS — E87.1 HYPO-OSMOLALITY AND HYPONATREMIA: ICD-10-CM

## 2023-07-24 DIAGNOSIS — R53.1 WEAKNESS: ICD-10-CM

## 2023-07-24 PROCEDURE — 99214 OFFICE O/P EST MOD 30 MIN: CPT | Mod: 25

## 2023-07-24 PROCEDURE — 36415 COLL VENOUS BLD VENIPUNCTURE: CPT

## 2023-07-27 ENCOUNTER — APPOINTMENT (OUTPATIENT)
Dept: VASCULAR SURGERY | Facility: CLINIC | Age: 78
End: 2023-07-27
Payer: MEDICARE

## 2023-07-27 ENCOUNTER — OUTPATIENT (OUTPATIENT)
Dept: OUTPATIENT SERVICES | Facility: HOSPITAL | Age: 78
LOS: 1 days | Discharge: HOME | End: 2023-07-27

## 2023-07-27 VITALS
HEART RATE: 48 BPM | SYSTOLIC BLOOD PRESSURE: 139 MMHG | HEIGHT: 69.5 IN | DIASTOLIC BLOOD PRESSURE: 81 MMHG | BODY MASS INDEX: 24.61 KG/M2 | TEMPERATURE: 97.8 F | WEIGHT: 170 LBS

## 2023-07-27 DIAGNOSIS — Z98.890 OTHER SPECIFIED POSTPROCEDURAL STATES: Chronic | ICD-10-CM

## 2023-07-27 DIAGNOSIS — Z87.09 PERSONAL HISTORY OF OTHER DISEASES OF THE RESPIRATORY SYSTEM: Chronic | ICD-10-CM

## 2023-07-27 DIAGNOSIS — D50.9 IRON DEFICIENCY ANEMIA, UNSPECIFIED: ICD-10-CM

## 2023-07-27 DIAGNOSIS — I83.893 VARICOSE VEINS OF BILATERAL LOWER EXTREMITIES WITH OTHER COMPLICATIONS: ICD-10-CM

## 2023-07-27 PROCEDURE — 93970 EXTREMITY STUDY: CPT

## 2023-07-27 PROCEDURE — 99204 OFFICE O/P NEW MOD 45 MIN: CPT

## 2023-08-11 ENCOUNTER — APPOINTMENT (OUTPATIENT)
Dept: HEMATOLOGY ONCOLOGY | Facility: CLINIC | Age: 78
End: 2023-08-11

## 2023-08-11 NOTE — PHYSICAL EXAM
[Restricted in physically strenuous activity but ambulatory and able to carry out work of a light or sedentary nature] : Status 1- Restricted in physically strenuous activity but ambulatory and able to carry out work of a light or sedentary nature, e.g., light house work, office work [Normal] : affect appropriate [de-identified] : presented to the office by himself, ambulated to the office with a cane,

## 2023-08-11 NOTE — HISTORY OF PRESENT ILLNESS
[Disease:__________________________] : Disease: [unfilled] [de-identified] : Hai Perales is a 75 year old male presenting to the office for hematologic evaluation. He is referred here by Dr. Kurtis Rossi (internal medicine). Patient was noted have the following lab values from last month: hgb 6.8, ferritin 6, serum iron 15, iron saturation 4%. He admitted to having a history of iron deficiency anemia for the last 5 years. He admitted to oral iron supplementation but denied eating red meats. He denied receiving blood transfusions and intravenous iron in the past.   [FreeTextEntry1] : iron 65 mg [de-identified] : He has felt fatigue yesterday; some dizziness and no falls. No nausea and no vomiting. He has not had bleeding\par  \par  10/06/2022 - Patient seen in a follow up visit after 20 months (last visit was 2/2021). Pt seen for AUBREY was on oral Fe - stated he stopped taking it few months ago. \par  He drove himself to this appointment presented to the office by himself. \par   He c/o intermittent chronic dizziness , worsening balance uses cane for ambulation, Denies falls, syncope, bleeding, bruising, blood transfusions, IV iron, fever, chills, night sweats, weight loss. \par  He had LAD stent on ASA, Plavix, NTG SL prn, Ranexa. Sinus Bradycardia HR @ 48 bpm followed by per Dr. Ari Celis Cardiologist. No pacemaker placement plans as of yet. \par  He takes Sinemet for  Parkinson's followed by Neurologist Dr. Jarad Arana in [place]. He has worsening balance uses a cane for ambulation. \par  Since his last visit with us he has undergone Paraesophageal / complex incisional hernia surgery. \par  Underwent a Right Total Hip replacement 8/2022. \par  Evaluated by Pulm Dr. Olivas for Intractable Hiccups was on Thorazine, then changed to Baclofen by Neurologist. Hiccups have resolved. \par  \par  2/10/2023 - Patient seen in a f/u visit today. He fell out of bed in the middle of the night in January 2023 - Head CT was unremarkable for acute findings. \par  He reports compliance with medications and f/u with his doctors. He is followed by Neurologist  Dr. Jarad Arana for Parkinson's disease. \par  He has self discontinued his Plavix, Atorvastatin because he reports his cholesterol levels were normal - he did not speak with his Cardiologist regarding this discontinuation. 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 \par  \par   [Cardiovascular] : Cardiovascular [Constitutional] : Constitutional [ENT] : ENT [Dermatologic] : Dermatologic [Endocrine] : Endocrine [Gastrointestinal] : Gastrointestinal [Genitourinary] : Genitourinary [Gynecologic] : Gynecologic [Infectious] : Infectious [Musculoskeletal] : Musculoskeletal [Neurologic] : Neurologic [Pain] : Pain [Pulmonary] : Pulmonary [Hematologic] : Hematologic [Date: ____________] : Patient's last distress assessment performed on [unfilled]. [0 - No Distress] : Distress Level: 0 [80: Normal activity with effort; some signs or symptoms of disease.] : 80: Normal activity with effort; some signs or symptoms of disease.  [FreeTextEntry7] : Pt able to drive, runs errands and attend to his ADLs  [ECOG Performance Status: 1 - Restricted in physically strenuous activity but ambulatory and able to carry out work of a light or sedentary nature] : Performance Status: 1 - Restricted in physically strenuous activity but ambulatory and able to carry out work of a light or sedentary nature, e.g., light house work, office work

## 2023-08-11 NOTE — ASSESSMENT
[FreeTextEntry1] : MARYBETH Landry is a patient with a history of iron deficiency anemia partially on the basis of a dietary factor. THe HGB is not low enough to account for his symptoms but I would advise him to continue with the oral iron daily. His cholesterol level was discussed with him and he will make dietary adjustments.\par   Overall management of his conditions discussed and he will continue with the physical therapy.\par  Patient seen with A Hai MENDOZA\par  RTC 4 months\par  \par  10/6/2022- Mr. Hai Perales was seen in a follow up visit today after 20 months. Pt w h/o AUBREY self discontinued oral Fe, HTN, HLD, CAD s/p LAD stent on ASA, Plavix, Sinus Node dysfxn, Sinus Bradycardia HR 48 bpm, Parkinson's disease on Sinemet, with recent balance difficulty ambulated with a cane, chronic dizziness, Autonomic Orthostatic Hypotension, Paraesophageal, incisional hernia surgery, Right Total Hip replacement 8/22. \par  He denies any bleeding, blood transfusions, IV iron. \par  Today's CBC - Anemia without microcytosis - Hgb = 12.1, HCT = 35.7%,  MCV = 85.4, RDW = 12.6%, \par  Reticulocyte count, Vitamin B12, Folate  - WNL, \par  Ferritin = 36 (trending down), Iron = 43, TIBC = 354, % Fe Saturation = 12%. \par  No indication for blood transfusion or IV iron. \par  Will recommend to take Slow Fe 2 tabs every other day with OJ, citrus, Vit C. Patient doesn't eat much of red meat due to his cardiac condition. Encouraged to incorporate iron rich vegetables with citrus for better absorption. \par  F/u with PCP, Neurologist, Cardiologist at their discretion, and recommendation. \par  Medication compliance was advised. CBC results were discussed in person, all questions, concerns were addressed with the patient during this visit to his satisfaction. \par  RTC in 4 months.\par  Case management, care plan was discussed with Dr. Adalid Wills. \par  \par  Addendum Note: 10/7/2022 - CBC, Ferritin, Iron studies, Retic count, Vit B12, folate levels were discussed with the patient over the phone. all questions, concerns were addressed during the visit and today's phone call.\par  Per pt's request will mail copies of this visit's lab results to his home address. \par  \par  2/10/2023 - Pt w h/o h/o AUBREY, HTN, HLD, CAD s/p LAD stent on ASA, Plavix, Sinus Node dysfxn, Sinus Bradycardia HR 48 bpm, Parkinson's disease on Sinemet, with recent balance difficulty ambulated with a cane, chronic dizziness, Autonomic Orthostatic Hypotension, Paraesophageal, incisional hernia surgery, Right Total Hip replacement 8/22 seen in a f/u visit for AUBREY.\par  1) Anemia resolved - Labs  1/30/ 2023 reviewed - discussed with patient - copy of labs provided.  Will recommend to continue Slow Fe 2 tabs every other day with OJ, citrus /  Vit C.\par  2) Recurrent Intermittent Chronic Dizziness - pt requested ENT phone number - has seen ENT in the past - [facility] phone number provided. \par  Head CT scan results provided to pt at his request. \par  Medication compliance emphasized. Pt advised to speak with physicians prior to discontinuation of medications.\par  f/u PCP, Neurologist, Orthopedics, Cardiologist at their discretion. \par  RTC in 6 months\par  Case management, care plan d/w Dr. Adalid Wills \par   [Supportive] : Goals of care discussed with patient: Supportive [Palliative Care Plan] : not applicable at this time

## 2023-08-11 NOTE — REVIEW OF SYSTEMS
[Fatigue] : no fatigue [Dizziness] : dizziness [Difficulty Walking] : difficulty walking [Negative] : Allergic/Immunologic [FreeTextEntry2] : presented to the office with a cane  [FreeTextEntry3] : wears reading glasses [FreeTextEntry5] : Sinus Bradycardia HR 48 bpm, denies pacemaker  [FreeTextEntry7] : h/o Paraesophageal Incisional Hernia surgery  [FreeTextEntry9] : Right hip replacement 8/2022, chronic Knee pain bilateral sec to OA  [FreeTextEntry8] : no hematuria  [de-identified] : Parkinson's disease uses cane during ambulation sometimes a walker, chronic dizziness, Fell out of bed in the night while sleeping in Jan 2023

## 2023-08-21 ENCOUNTER — APPOINTMENT (OUTPATIENT)
Dept: CARDIOLOGY | Facility: CLINIC | Age: 78
End: 2023-08-21
Payer: MEDICARE

## 2023-08-21 ENCOUNTER — NON-APPOINTMENT (OUTPATIENT)
Age: 78
End: 2023-08-21

## 2023-08-21 VITALS
OXYGEN SATURATION: 97 % | SYSTOLIC BLOOD PRESSURE: 127 MMHG | WEIGHT: 168 LBS | HEIGHT: 69 IN | HEART RATE: 48 BPM | BODY MASS INDEX: 24.88 KG/M2 | DIASTOLIC BLOOD PRESSURE: 81 MMHG

## 2023-08-21 VITALS — DIASTOLIC BLOOD PRESSURE: 76 MMHG | SYSTOLIC BLOOD PRESSURE: 112 MMHG

## 2023-08-21 DIAGNOSIS — R03.0 ELEVATED BLOOD-PRESSURE READING, W/OUT DIAGNOSIS OF HYPERTENSION: ICD-10-CM

## 2023-08-21 PROCEDURE — 93000 ELECTROCARDIOGRAM COMPLETE: CPT

## 2023-08-21 PROCEDURE — 99214 OFFICE O/P EST MOD 30 MIN: CPT

## 2023-08-21 NOTE — ASSESSMENT
[FreeTextEntry1] : Patient with chronic exertional angina, and angina with emotional upset that responds to nitroglycerin. Nonobstructive coronary artery disease with only 40% distal LAD back in 2012. Symptoms were stable. Echo with normal LV function and mild to moderate MR, for many years, also, stable. Has sinus node dysfunction, with sinus bradycardia, but no syncope or near-syncope.   Able to walk long distances normally (now somewhat limited by hip and knee pain). (Had abdominal and inguinal hernia repair. Then for 3 weeks or so his angina was back if he goes upstairs or walks uphill or walks too fast and relieved with rest. Heart rate here was 53.  Blood pressure normal. Otherwise exam unchanged. None of his medications are negatively chronotropic including Ranexa and his Parkinson's medications.)  (Was somewhat depressed over his diagnosis of Parkinson's. Follows up with Dr. Sohail Arana at [facility].  Still dealing with his knee arthritis, but not ready for a replacement.) Labs were sent which ruled out anemia or any other reason that might be causing his increased angina. Long discussion with patient about possible management going forward. We increased his Ranexa to 1000 twice daily (although for reasons of his own choosing 1 day he takes 1000 twice daily and the next day he still takes 500 twice daily.)  He returned for stress test October 6, 2021 which had EKG abnormalities and his usual chest pain at a low workload.  Absolutely no echocardiographic evidence of ischemia post exercise.  Overall stress test was not that different than 2016 except the symptoms and EKG changes, at a much lower workload.  I had a long discussion with the patient and his wife and we agreed to schedule coronary angiography.  A significant 70% mid LAD lesion was found and stented and the patient has done well since.  Claims he is actually feeling much better.  He had mild nonobstructive disease in the circumflex as well. (He has remained with a resting bradycardia in the 40s for quite some time now without any syncope or near syncope.  In the past he has been able to mount a good heart rate response to exercise and  I was satisfied with his heart rate response up to 111 with exercise. Issues with his hip and abdominal hernia but currently stable, was on DAPT.--now finally on single agent (clopidogrel)  He had the hip replacement about 4 ago.  He has had some edema on the right leg ever since and shoulder to be orthopedist and the neurologist said you should go see a cardiologist.  It is minimal and he has almost none on the left leg, may be just trace by the ankle and he has a history of some venous insufficiency.  There is no JVD, there is no reason to suspect right heart failure or left heart failure etc.  No story or exam or findings to suggest DVT at this point in time.  I reassured him that the edema is benign, probably more on the venous etiology and that he will always have some swelling on the right side and more on the right because of his surgery.  I reassured him that there does not seem to be any acute cardiac issue going on.  Still with the same complaint of lightheadedness.  Does not sound like near syncope with transient high-grade block or periods of asystole.  It is only when he changes positions and only for a few seconds.  He was not really orthostatic in the office and again as mentioned his heart rate does  with exertion and exercise. (In the past I briefly considered holding his Ranexa but it seems he was getting angina and he does have an abnormal treadmill stress test although without echo evidence of ischemia.  He recently had true obstructive disease and an LAD stent. ) Since then his symptoms have gotten worse and clearly are orthostatic and he now has demonstrated orthostatic hypotension.  After discussion with Dr. Sohail Arana of neurology we started him on Midodrine 5 mg twice daily which he is taking at 12 noon and 6 PM.  Long discussion again with the patient about changing positions slowly etc. and in the meantime I would like to go up on his medication.  We agreed to do 10 mg and keep it at 6 PM and 12 noon for now as he seems to like that regimen and then reevaluate in 4 weeks.  For some reason he does not seem to be getting symptoms first thing in the morning.  Comes back today complaining of dizziness and is only mildly orthostatic and obviously he is more depressed than anything especially with his friend dying of pancreatic cancer.  His exam is unchanged and he is mildly orthostatic.  Again I mentioned that the eventual options are to go up to 10 mg 3 times a day and then if still necessary could add Florinef.  At first he was resistant to more medications but at the end it seemed to be willing to try 3 times a day. In addition he went off the atorvastatin 3 weeks ago because he says he was having some stomach pain but on the labs from Dr. Rossi his LDL went up to 149 when it should be below 70 so he will resume his atorvastatin and diet.  He also decided to go back to clopidogrel instead of aspirin.   Urgent visit today August 21, 2023 because his  frightened him telling him he must see cardiologist because his blood pressure during exercise was in the 160s over 90s.  I explained to the patient about high blood pressure not being an emergency, how blood pressure goes up with exercise naturally even to 200 or more and it is a normal response, etc. and I explained to him that people who have learned and know how to take blood pressure should be taught once significant and wants an emergency and what is not etc.  He is totally stable.  Still complains of some dizziness which is mostly mild orthostasis.  Now on 10 mg of midodrine 3 times a day.  Not on any medicines that would lower his blood pressure.  Not eager to add Florinef etc.  Okay for him to just resume his normal activities and keep his regular appointments with me.  Labs were sent in addition.

## 2023-08-21 NOTE — HISTORY OF PRESENT ILLNESS
[FreeTextEntry1] : In  the patient presented with exertional chest burning. Abnormal stress echocardiogram. Cardiac catheterization revealed nonobstructive coronary disease (only 40% distal LAD). He been maintained on Ranexa with good relief of his symptoms.  2016. The last couple weeks the patient has been getting his chest tightness, primarily at rest when he stressed. He's had to use nitroglycerin at least once a week. He's had no chest discomfort when he walks on the treadmill or exercise in the gym. Patient also feels slightly lightheaded once in a great while. He usually is sitting when it occurs. He does not have it when he is walking or standing. Stress echo on  was negative and the echo again showed mild to moderate MR, unchanged. 2017. One year since last visit. Symptoms, maybe once a week, and usually related to emotional upset and stress. Responds to nitroglycerin within 5 minutes. He never increased his Ranexa. He was complaining about the cost of Ranexa and we discussed the rationale for Ranexa versus other medications in his case. Beta blockers must be avoided given his sinus bradycardia. He had labs with Dr. Rossi on . He remains with iron deficiency anemia, but he had a negative GI workup in . Since then he has increased his iron from once a day to twice a day. His lipids had a cholesterol of 147, HDL 70, LDL 63, triglycerides 68, and he remains on atorvastatin. His hemoglobin A1c was 5.5, and the rest of his labs were unremarkable. He had a flu shot with Dr. Rossi. 2018. Patient has recently been noticing that he is getting more short of breath with exertion with some chest pressure. Can barely walk a block or 2. 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 The orthopedist wants to give him a synthetic cartilage injection and he is seeing a vascular doctor later this week because of the swelling in his legs, which is a little more on the right side, where his knee is acting up. He said he walks and feels like an old man. EKG is sinus bradycardia and unchanged. Found to have severe iron deficiency anemia and referred to GI. 2018. The patient returns in followup. Had colonoscopy and endoscopy that were mostly negative. Still considering capsule study. In the meantime, he is on iron twice a day, and his levels have come up. He had been transfused 3 units at the beginning. He still has some orthostatic dizziness at times, but otherwise most of his symptoms are gone. He is getting ready for a trip to [employer]. His EKG remains with sinus bradycardia, and some nonspecific ST changes, but unchanged. 2018. Patient was at hematology on , and a heart rate of 42, was recorded. I was told he had a low heart rate and needed an appointment and he was scheduled for today. Now the patient says when he called he told them he was dizzy and having symptoms, which was not related to me. Here, he is in sinus bradycardia at 46, with mild ST depressions V4 through 6 as well as lead II and aVF. His dizziness is mostly when he changes positions that'll last for a few seconds, and then passes. He is not limited in his exercise capacity and does not quite get near syncopal. No other complaints. Able to walk 20 minutes the other day without stopping. 2019.  First visit in over a year.  Slightly depressed over diagnosis of Parkinson's but so far mild case and doing well.  On Azilect 0.5 twice daily and resagiline 1 mg daily.  Heart rate still slow at 41 but no syncope or near syncope.  Occasionally feels a little dizzy sitting or walking but does not sound like near syncope.  Changed his atorvastatin to every other day on his own but has not had lipids since October of last year with me.  He will have Dr. Rossi added to the blood test when he sees him in a few weeks.  Hemoglobin and hematocrit and iron levels have been holding steady.  No chest pain or shortness of breath.  Considering some experimental options for the Parkinson's going forward. 2019.  Patient called and wanted to come in.  Saw Dr. Sohail Arana of neurology who told him he thinks he needs a pacemaker and that his low heart rate could be explaining some of his Parkinson's symptoms.  I put in a call to Dr. Arana and I am awaiting a response.  In the meantime reviewed the symptoms and the procedure with the patient.  At times he says he does have some lightheadedness and dizziness and wants the pacemaker again at other times is too nervous does not want to stay in hospital overnight and does not think he is having any symptoms different than the last few years.  Certainly no syncope and no definite near syncope.  Heart rate here was 48.  Blood pressure okay.  No recent change in medications. 2020.  Patient here in follow-up.  Remains in sinus bradycardia at 47 with an otherwise normal ECG except incomplete right bundle branch block.  Recent labs with cholesterol 209, HDL 68, .  Normal thyroid function.  Last week he felt dizzy for most of the week and most of the days on and off  somewhat lightheaded but not near syncopal, not vertigo.  This week however he feels perfectly back to himself so not that likely to be from conduction disease.  No other complaints.  He remains on Lipitor and Ranexa. 2021.  Patient here for preop cardiac evaluation due to an abnormal ECG.  Last here over a year ago.  Has been doing well for the most part but has developed mild Parkinson's.  Not having any issues with angina currently but upon pushing the history he does have some orthostatic dizziness now and then.  Even to the point now where he knows to stop and wait a few minutes when he gets out of the car etc. (Actually I had mentioned this in my note over a year ago and that his symptoms were probably orthostasis related to his Parkinson's.)  Here in fact there is a drop in his blood pressure from lying to sitting and is probably related to his parkinsonism.  Otherwise no symptoms or signs of heart failure unstable angina etc.  He always has a mildly abnormal ECG with some ST depressions in the inferior leads and V4 through 6 and the EKG done preop at [facility] was not significantly changed from his previous EKGs of over a year ago.  He will be having surgery on both an inguinal and an abdominal hernia by Dr. Bo. September 10, 2021.  Patient here for urgent visit.  Called yesterday with a burning chest pain and I was unavailable and was told to speak with or see his internist Dr. Rossi.  He saw Dr. Rossi this morning and EKG was totally unchanged with his usual sinus bradycardia and minimal ST depressions.  He is now here. His surgery and the recovery was a little more difficult. And now for the last week or so he has been having his usual exertional chest burning relieved with rest. It does not seem to be progressive. No signs that he is anemic again but he is more fatigued since the surgery. He also talked about his mild orthostasis which again is more likely related to the Parkinson's. After long discussion we decided that if his troponin is positive but he is stable we will schedule him for an angiogram next week, and if troponin is negative we will try to double up on the Ranexa and consider a stress test or a CT angio of his coronaries. 2021. Spoke with patient.  Troponin negative.  Excellent lipid profile, normal hemoglobin A1c, normal BNP, sodium 131, hematocrit 37.  Patient actually feeling much better and would like to wait until the stress test scheduled at the beginning of October.  Only complaint is vague lightheadedness at times.  2021.  Patient returned for stress echo and follow-up.  He has been taking the Ranexa 1000 twice daily 1 day and 500 twice daily the next alternating for some reason of his own.  Has not really noticed any change in his symptoms.  On the stress test he got his symptoms at 4-1/2 minutes of a low level protocol with his heart rate 80 and there were ST depressions mostly in II and aVF but also in V3 through V6 that persisted in recovery.  He was able to do 10 minutes of the low level protocol to a heart rate of 111.  He got his usual chest burning that increased with exercise and took about 8 or 9 minutes of recovery to go away.  The echocardiogram post exercise was actually normal without wall motion abnormalities.  The resting echo itself had normal LV and RV function with mild to moderate MR and mild TR and was really unchanged from 2016.  After long discussion with patient and his wife we elected to schedule coronary angiography. 2021.Patient had cath yesterday.  70% stenosis in the mid LAD confirmed as significant with a positive IFR.  Stent was placed.  He should remain on dual antiplatelet therapy for 3 months.  I spoke with Dr. Man yesterday and I left a message for the patient this evening.  He should make follow-up appointment with me. 2021.  Patient returns in follow-up after his LAD stent as noted above.  EKG here is sinus at 45 with his usual mild ST depressions in leads II, V2 through 5. Feels wonderful since the stent but is also a little anxious ever since realizing that he needs a stent.  Still on Ranexa along with his statin and dual antiplatelet therapy and his Parkinson's medication.  Blood pressure running a little high today probably just from the stress.  Labs will be checked. 2021.  Patient here in follow-up.  Doing well from a cardiac point of view.  Another abdominal hernia popped up but he may need surgery again but I told him to ideally wait 6 months from the time of his stent.  He also needs injections in his knee and possibly hip and may need surgery in the future.  Good spirits with no other symptoms or complaints.  Still on dual antiplatelet therapy.  May be going away in January but a lot of stress with his mother-in-law and her Alzheimer's etc. 2022.  Semiurgent visit as patient just found out he is going to need hip replacement surgery.  Will probably be doing it with Dr. Nick Mackenzie at [facility].  Also discussed his hernia surgery with Dr. Bo to and most likely he will have the hernia surgery first.  For now he is willing to wait a little longer and stay on dual antiplatelet therapy.  No chest pain no shortness of breath no other new issues.  Reviewed the pros and cons of waiting 6 months versus waiting a little last in order to have the surgery. 2022.  Due to his abdominal discomfort he finally scheduled the hernia surgery from .  He has remained stable from a cardiac point of view.  Still on dual antiplatelet therapy.  No chest pain, shortness of breath, melena etc.  Baseline EKG still abnormal with some resting ST depressions but unchanged from previous tracings.  He thinks he will need hip replacement surgery pretty soon after that as well. May 13, 2022.  Patient returns after his hernia surgery of .  He remains in sinus bradycardia at 48 and the rest of his EKG is unchanged.  Had a rough time the first few weeks postop because of severe pain but now is doing well. 2022.  Patient came for urgent visit.  Has been feeling dizzy and lightheaded lately and call the office and could not get through so just came over.  Very difficult history to pin down but when trying to be more exact, never truly near syncopal, episodes last at least a minute but usually not more than 2 minutes.  Does occasionally say he just feels a little fuzzy but it is not chronic.  He does feel like taking a nap in the afternoon and has a little more generalized fatigue but that is a different symptom.  Here in the office he was in sinus bradycardia at only 44 but totally asymptomatic.  He is on Ranexa and he is on Parkinson's medications. 2022.  Patient called to say he is scheduled for hip replacement with Dr. Nick Mackenzie on .  He is having presurgical testing at [facility] later this week.  Based on his exam on , if his presurgical lab work is in order I see no contraindication to the upcoming surgery and anesthesia. 2022.  Patient here in follow-up.  Hip surgery went well without complication.  The home physical therapist stopped coming 2 weeks ago so for 2 weeks he did therapy on his own and now is starting to do outpatient physical therapy.  Still sore and slow to get around.  Still complains of dizziness which is mostly when he changes position and it just lasts for a few seconds or so.  Not orthostatic in the office, and when he is walking he does not have fatigue or shortness of breath or dizziness to blame on his low heart rate; as a matter fact his last stress test 1 year ago his resting heart rate was 45 but went up to 111 with a little exercise.  Here today he is resting ECG had a heart rate of 47 and during his vital signs his heart rate was 54.  He may end up needing a knee replacement in the future as well. 2022.  Patient here in follow-up because he mentions some swelling in his right leg ever since his right hip replacement and the doctor said go see a cardiologist.  No chest pain or shortness of breath.  Occasional lightheadedness now and then as he has had in the past.  Left leg has minimal pedal edema in the right leg is the one that had the hip replacement on.  No sudden onset of swelling redness tenderness etc. 2023.His neurologist Sohail thank called me that the patient is orthostatic with blood pressure dropping from 110-90.  Not really on any medications that should contribute to that.  I agree with the recommendation to start midodrine 5 mg twice a day.  2023.Received a note from Dr. Sohail Arana his neurologist based on the visit March 10, 2023 and then got a message that he will call me today to discuss the patient.  He has some orthostatic symptoms and his blood pressure dropped from 110-90 with standing up.  There is no recording of what his pulse was either supine or standing.  He is not on any cardiac medications that would affect his blood pressure.  This is most likely related to his Parkinson's even though in other areas his Parkinson's is under good control.  I agree that midodrine would be acceptable for the patient and I would avoid Florinef for now.  May 17, 2023.  Patient here for semiurgent visit complaining of dizziness.  Was already evaluated by Dr. Rossi his internist who noted that the dizziness is clearly with changing positions and did find him to be orthostatic.  He has been on midodrine 5 mg.  He admits he is somewhat better since starting the midodrine which he takes at 12 noon and at 6 PM.  But he is still having episodes and I believe he is more anxious about them then disabled.  EKG here is sinus rhythm at 53 with a left anterior hemiblock and an incomplete right bundle branch block, nonspecific minimal ST changes, unchanged. 2023.  Patient returns in follow-up.  Last visit after speaking with Dr. Sohail Arana we went up to midodrine 10 mg twice a day.  Here now complaining of dizziness, complaining of other issues but mostly very depressed.  He had 2 friends who  of pancreatic cancer 1 just this past week.  Initially did not want to go up on his midodrine but now says he might be willing to.  Meanwhile he had labs with Dr. Rossi and his cholesterol was very high and he says now he is going to go back on the atorvastatin needing he stopped it on his own.  He complains he has too many pills that may make him nauseous etc.  No exertional chest pain or shortness of breath.  He even wanted to stop the Plavix and I told him he has to stay on either Plavix or aspirin and the Plavix is superior to aspirin.  No chest pain shortness of breath etc.  Mildly orthostatic as can be seen by the vital signs but not terrible but he does feel dizzy when he sits up. 2023.  Patient was having physical therapy on Friday,  and his  got a blood pressure in the 160s over 90s, stopped the exercise, made him lie down, called his wife, and insisted that he must see his cardiologist right away.  He called here and was told it was not an emergency and he could see me after the weekend and he comes in today.  He is in sinus rhythm at 49 with a left anterior hemiblock nonspecific ST changes incomplete right bundle branch block unchanged.  His blood pressure here was 127/81 supine, 116/76 sitting, 112/76 standing.  He still complains of dizziness which is mostly when he changes position and starts walking but otherwise is very stable.  Is now on midodrine 10 mg 3 times a day.  Not on any medications that would lower his blood pressure.  No cardiac symptoms or issues.

## 2023-08-21 NOTE — DISCUSSION/SUMMARY
[FreeTextEntry1] : Reassure patient.  No change in medications.  Check labs.  If all okay just keep regular upcoming appointment. [EKG obtained to assist in diagnosis and management of assessed problem(s)] : EKG obtained to assist in diagnosis and management of assessed problem(s)

## 2023-08-21 NOTE — REVIEW OF SYSTEMS
[Lower Ext Edema] : lower extremity edema [Dizziness] : dizziness [Depression] : depression [Negative] : Heme/Lymph [Weight Gain (___ Lbs)] : no recent weight gain [Feeling Fatigued] : not feeling fatigued [Weight Loss (___ Lbs)] : no recent weight loss [Dyspnea on exertion] : not dyspnea during exertion [Chest Discomfort] : no chest discomfort [Palpitations] : no palpitations [Syncope] : no syncope [Abdominal Pain] : no abdominal pain [Anxiety] : no anxiety [Suicidal] : not suicidal [FreeTextEntry5] : see HPI.  Bed varicose veins especially left leg [FreeTextEntry7] : Abdominal hernia [FreeTextEntry9] : s/p hip replacement [de-identified] : Parkinsonism and some orthostasis [de-identified] : Seems a little depressed, less anxious

## 2023-08-22 LAB
ALBUMIN SERPL ELPH-MCNC: 4.5 G/DL
ALP BLD-CCNC: 81 U/L
ALT SERPL-CCNC: 9 U/L
ANION GAP SERPL CALC-SCNC: 9 MMOL/L
AST SERPL-CCNC: 16 U/L
BILIRUB SERPL-MCNC: 0.7 MG/DL
BUN SERPL-MCNC: 16 MG/DL
CALCIUM SERPL-MCNC: 9.3 MG/DL
CHLORIDE SERPL-SCNC: 98 MMOL/L
CHOLEST SERPL-MCNC: 188 MG/DL
CO2 SERPL-SCNC: 25 MMOL/L
CREAT SERPL-MCNC: 0.88 MG/DL
EGFR: 90 ML/MIN/1.73M2
ESTIMATED AVERAGE GLUCOSE: 128 MG/DL
GLUCOSE SERPL-MCNC: 129 MG/DL
HBA1C MFR BLD HPLC: 6.1 %
HDLC SERPL-MCNC: 65 MG/DL
LDLC SERPL CALC-MCNC: 102 MG/DL
NONHDLC SERPL-MCNC: 123 MG/DL
NT-PROBNP SERPL-MCNC: 188 PG/ML
POTASSIUM SERPL-SCNC: 4.3 MMOL/L
PROT SERPL-MCNC: 6.7 G/DL
SODIUM SERPL-SCNC: 131 MMOL/L
TRIGL SERPL-MCNC: 117 MG/DL
TSH SERPL-ACNC: 1.41 UIU/ML

## 2023-08-22 RX ORDER — EZETIMIBE 10 MG/1
10 TABLET ORAL
Qty: 90 | Refills: 1 | Status: ACTIVE | COMMUNITY
Start: 2023-08-22 | End: 1900-01-01

## 2023-09-05 ENCOUNTER — APPOINTMENT (OUTPATIENT)
Dept: INTERNAL MEDICINE | Facility: CLINIC | Age: 78
End: 2023-09-05
Payer: MEDICARE

## 2023-09-05 VITALS
WEIGHT: 160 LBS | HEART RATE: 52 BPM | OXYGEN SATURATION: 99 % | TEMPERATURE: 97.3 F | HEIGHT: 69 IN | BODY MASS INDEX: 23.7 KG/M2

## 2023-09-05 PROCEDURE — 99213 OFFICE O/P EST LOW 20 MIN: CPT | Mod: 25

## 2023-09-06 ENCOUNTER — APPOINTMENT (OUTPATIENT)
Dept: HEMATOLOGY ONCOLOGY | Facility: CLINIC | Age: 78
End: 2023-09-06

## 2023-09-06 VITALS — SYSTOLIC BLOOD PRESSURE: 100 MMHG | DIASTOLIC BLOOD PRESSURE: 60 MMHG

## 2023-09-06 LAB
ALBUMIN SERPL ELPH-MCNC: 4.4 G/DL
ALP BLD-CCNC: 83 U/L
ALT SERPL-CCNC: 8 U/L
ANION GAP SERPL CALC-SCNC: 11 MMOL/L
APPEARANCE: CLEAR
AST SERPL-CCNC: 14 U/L
BACTERIA UR CULT: NORMAL
BACTERIA: NEGATIVE /HPF
BILIRUB SERPL-MCNC: 0.5 MG/DL
BILIRUBIN URINE: NEGATIVE
BLOOD URINE: NEGATIVE
BUN SERPL-MCNC: 18 MG/DL
CALCIUM SERPL-MCNC: 9 MG/DL
CAST: 0 /LPF
CHLORIDE SERPL-SCNC: 98 MMOL/L
CO2 SERPL-SCNC: 25 MMOL/L
COLOR: NORMAL
CREAT SERPL-MCNC: 0.85 MG/DL
CRP SERPL-MCNC: <3 MG/L
EGFR: 91 ML/MIN/1.73M2
EPITHELIAL CELLS: 0 /HPF
ERYTHROCYTE [SEDIMENTATION RATE] IN BLOOD BY WESTERGREN METHOD: 4 MM/HR
GLUCOSE QUALITATIVE U: NEGATIVE MG/DL
GLUCOSE SERPL-MCNC: 152 MG/DL
KETONES URINE: ABNORMAL MG/DL
LEUKOCYTE ESTERASE URINE: ABNORMAL
MICROSCOPIC-UA: NORMAL
NITRITE URINE: NEGATIVE
PH URINE: 5.5
POTASSIUM SERPL-SCNC: 4.3 MMOL/L
PROT SERPL-MCNC: 6.8 G/DL
PROTEIN URINE: NEGATIVE MG/DL
RED BLOOD CELLS URINE: 3 /HPF
SODIUM SERPL-SCNC: 134 MMOL/L
SPECIFIC GRAVITY URINE: 1.02
UROBILINOGEN URINE: 0.2 MG/DL
WHITE BLOOD CELLS URINE: 0 /HPF

## 2023-09-06 NOTE — HISTORY OF PRESENT ILLNESS
[FreeTextEntry8] : The patient was in [place] last week and around people.  He now has a cough with green phlegm, left ear clogged chest pain to cough.  No dyspnea, fever, sinus pain, sore throat.  He tested negative for COVID-19 today.

## 2023-09-06 NOTE — ASSESSMENT
[FreeTextEntry1] : He has a URI which might be viral but he is coughing up green phlegm.  Will treat with Doxycycline.  OTC meds discussed. Call PRN.    recent blood tests reviewed- U/A and UCX negative.  Blood tests unremarkable.

## 2023-09-12 ENCOUNTER — OUTPATIENT (OUTPATIENT)
Dept: OUTPATIENT SERVICES | Facility: HOSPITAL | Age: 78
LOS: 1 days | End: 2023-09-12
Payer: MEDICARE

## 2023-09-12 ENCOUNTER — APPOINTMENT (OUTPATIENT)
Dept: RADIOLOGY | Facility: CLINIC | Age: 78
End: 2023-09-12
Payer: MEDICARE

## 2023-09-12 DIAGNOSIS — Z98.890 OTHER SPECIFIED POSTPROCEDURAL STATES: Chronic | ICD-10-CM

## 2023-09-12 DIAGNOSIS — R05.9 COUGH, UNSPECIFIED: ICD-10-CM

## 2023-09-12 DIAGNOSIS — Z87.09 PERSONAL HISTORY OF OTHER DISEASES OF THE RESPIRATORY SYSTEM: Chronic | ICD-10-CM

## 2023-09-12 PROCEDURE — 71046 X-RAY EXAM CHEST 2 VIEWS: CPT | Mod: 26

## 2023-09-12 PROCEDURE — 71046 X-RAY EXAM CHEST 2 VIEWS: CPT

## 2023-09-20 ENCOUNTER — APPOINTMENT (OUTPATIENT)
Dept: CARDIOLOGY | Facility: CLINIC | Age: 78
End: 2023-09-20

## 2023-09-20 RX ORDER — ALBUTEROL SULFATE 90 UG/1
108 (90 BASE) INHALANT RESPIRATORY (INHALATION)
Qty: 1 | Refills: 3 | Status: ACTIVE | COMMUNITY
Start: 2021-10-07 | End: 1900-01-01

## 2023-09-21 ENCOUNTER — APPOINTMENT (OUTPATIENT)
Dept: INTERNAL MEDICINE | Facility: CLINIC | Age: 78
End: 2023-09-21
Payer: MEDICARE

## 2023-09-21 ENCOUNTER — APPOINTMENT (OUTPATIENT)
Dept: CT IMAGING | Facility: CLINIC | Age: 78
End: 2023-09-21
Payer: MEDICARE

## 2023-09-21 ENCOUNTER — OUTPATIENT (OUTPATIENT)
Dept: OUTPATIENT SERVICES | Facility: HOSPITAL | Age: 78
LOS: 1 days | End: 2023-09-21
Payer: MEDICARE

## 2023-09-21 VITALS
TEMPERATURE: 97.7 F | BODY MASS INDEX: 24.88 KG/M2 | OXYGEN SATURATION: 98 % | HEART RATE: 62 BPM | WEIGHT: 168 LBS | HEIGHT: 69 IN

## 2023-09-21 DIAGNOSIS — Z98.890 OTHER SPECIFIED POSTPROCEDURAL STATES: Chronic | ICD-10-CM

## 2023-09-21 DIAGNOSIS — Z87.09 PERSONAL HISTORY OF OTHER DISEASES OF THE RESPIRATORY SYSTEM: Chronic | ICD-10-CM

## 2023-09-21 DIAGNOSIS — R05.9 COUGH, UNSPECIFIED: ICD-10-CM

## 2023-09-21 PROCEDURE — 99213 OFFICE O/P EST LOW 20 MIN: CPT | Mod: 25

## 2023-09-21 PROCEDURE — 71250 CT THORAX DX C-: CPT | Mod: 26,MH

## 2023-09-21 PROCEDURE — 71250 CT THORAX DX C-: CPT

## 2023-09-21 RX ORDER — FLUTICASONE PROPIONATE AND SALMETEROL 250; 50 UG/1; UG/1
250-50 POWDER RESPIRATORY (INHALATION)
Qty: 1 | Refills: 3 | Status: ACTIVE | COMMUNITY
Start: 2023-09-21 | End: 1900-01-01

## 2023-09-26 ENCOUNTER — APPOINTMENT (OUTPATIENT)
Dept: INTERNAL MEDICINE | Facility: CLINIC | Age: 78
End: 2023-09-26
Payer: MEDICARE

## 2023-09-26 VITALS
OXYGEN SATURATION: 98 % | BODY MASS INDEX: 24.88 KG/M2 | WEIGHT: 168 LBS | HEART RATE: 72 BPM | HEIGHT: 69 IN | TEMPERATURE: 97.2 F

## 2023-09-26 VITALS — DIASTOLIC BLOOD PRESSURE: 55 MMHG | SYSTOLIC BLOOD PRESSURE: 95 MMHG

## 2023-09-26 VITALS — DIASTOLIC BLOOD PRESSURE: 70 MMHG | SYSTOLIC BLOOD PRESSURE: 115 MMHG

## 2023-09-26 PROCEDURE — 99213 OFFICE O/P EST LOW 20 MIN: CPT | Mod: 25

## 2023-09-27 ENCOUNTER — OUTPATIENT (OUTPATIENT)
Dept: OUTPATIENT SERVICES | Facility: HOSPITAL | Age: 78
LOS: 1 days | Discharge: HOME | End: 2023-09-27

## 2023-09-27 ENCOUNTER — TRANSCRIPTION ENCOUNTER (OUTPATIENT)
Age: 78
End: 2023-09-27

## 2023-09-27 ENCOUNTER — APPOINTMENT (OUTPATIENT)
Dept: INTERNAL MEDICINE | Facility: CLINIC | Age: 78
End: 2023-09-27
Payer: MEDICARE

## 2023-09-27 VITALS
DIASTOLIC BLOOD PRESSURE: 65 MMHG | HEART RATE: 70 BPM | OXYGEN SATURATION: 99 % | TEMPERATURE: 97 F | SYSTOLIC BLOOD PRESSURE: 115 MMHG | WEIGHT: 168 LBS | BODY MASS INDEX: 24.88 KG/M2 | HEIGHT: 69 IN

## 2023-09-27 DIAGNOSIS — Z98.890 OTHER SPECIFIED POSTPROCEDURAL STATES: Chronic | ICD-10-CM

## 2023-09-27 DIAGNOSIS — R05.9 COUGH, UNSPECIFIED: ICD-10-CM

## 2023-09-27 DIAGNOSIS — Z87.09 PERSONAL HISTORY OF OTHER DISEASES OF THE RESPIRATORY SYSTEM: Chronic | ICD-10-CM

## 2023-09-27 DIAGNOSIS — D50.9 IRON DEFICIENCY ANEMIA, UNSPECIFIED: ICD-10-CM

## 2023-09-27 DIAGNOSIS — R91.8 OTHER NONSPECIFIC ABNORMAL FINDING OF LUNG FIELD: ICD-10-CM

## 2023-09-27 PROCEDURE — 99212 OFFICE O/P EST SF 10 MIN: CPT | Mod: 25

## 2023-09-27 RX ORDER — FLUTICASONE PROPIONATE 50 UG/1
50 SPRAY, METERED NASAL TWICE DAILY
Qty: 1 | Refills: 0 | Status: ACTIVE | COMMUNITY
Start: 2023-09-27 | End: 1900-01-01

## 2023-09-27 RX ORDER — FLUTICASONE FUROATE 100 UG/1
100 POWDER RESPIRATORY (INHALATION) DAILY
Qty: 1 | Refills: 3 | Status: DISCONTINUED | COMMUNITY
Start: 2021-12-09 | End: 2023-09-27

## 2023-09-27 RX ORDER — DOXYCYCLINE HYCLATE 100 MG/1
100 TABLET ORAL
Qty: 20 | Refills: 0 | Status: DISCONTINUED | COMMUNITY
Start: 2023-09-05 | End: 2023-09-27

## 2023-09-27 RX ORDER — CHLORHEXIDINE GLUCONATE 4 %
325 (65 FE) LIQUID (ML) TOPICAL
Qty: 60 | Refills: 6 | Status: DISCONTINUED | COMMUNITY
Start: 2022-10-07 | End: 2023-09-27

## 2023-09-27 RX ORDER — LORATADINE 5 MG
TABLET,CHEWABLE ORAL
Refills: 0 | Status: DISCONTINUED | COMMUNITY
End: 2023-09-27

## 2023-09-27 RX ORDER — SENNOSIDES 8.6 MG TABLETS 8.6 MG/1
TABLET ORAL
Refills: 0 | Status: DISCONTINUED | COMMUNITY
End: 2023-09-27

## 2023-09-27 RX ORDER — DOCUSATE SODIUM 100 MG/1
100 CAPSULE ORAL
Refills: 0 | Status: DISCONTINUED | COMMUNITY
End: 2023-09-27

## 2023-09-27 RX ORDER — METHYLPREDNISOLONE 4 MG/1
4 TABLET ORAL
Qty: 1 | Refills: 1 | Status: DISCONTINUED | COMMUNITY
Start: 2023-09-13 | End: 2023-09-27

## 2023-09-30 PROBLEM — R91.8 PULMONARY NODULES: Status: ACTIVE | Noted: 2023-05-16

## 2023-10-05 ENCOUNTER — RESULT REVIEW (OUTPATIENT)
Age: 78
End: 2023-10-05

## 2023-10-05 ENCOUNTER — APPOINTMENT (OUTPATIENT)
Dept: HEMATOLOGY ONCOLOGY | Facility: CLINIC | Age: 78
End: 2023-10-05
Payer: MEDICARE

## 2023-10-05 VITALS
BODY MASS INDEX: 24.81 KG/M2 | SYSTOLIC BLOOD PRESSURE: 113 MMHG | WEIGHT: 168 LBS | RESPIRATION RATE: 16 BRPM | TEMPERATURE: 97.1 F | HEART RATE: 55 BPM | OXYGEN SATURATION: 98 % | DIASTOLIC BLOOD PRESSURE: 75 MMHG

## 2023-10-05 DIAGNOSIS — D50.9 IRON DEFICIENCY ANEMIA, UNSPECIFIED: ICD-10-CM

## 2023-10-05 DIAGNOSIS — I20.9 ANGINA PECTORIS, UNSPECIFIED: ICD-10-CM

## 2023-10-05 LAB
BASOPHILS # BLD AUTO: 0.04 K/UL — SIGNIFICANT CHANGE UP (ref 0–0.2)
BASOPHILS NFR BLD AUTO: 0.7 % — SIGNIFICANT CHANGE UP (ref 0–2)
EOSINOPHIL # BLD AUTO: 0.19 K/UL — SIGNIFICANT CHANGE UP (ref 0–0.5)
EOSINOPHIL NFR BLD AUTO: 3.5 % — SIGNIFICANT CHANGE UP (ref 0–6)
HCT VFR BLD CALC: 38.4 % — LOW (ref 39–50)
HGB BLD-MCNC: 13.5 G/DL — SIGNIFICANT CHANGE UP (ref 13–17)
IMM GRANULOCYTES NFR BLD AUTO: 0.7 % — SIGNIFICANT CHANGE UP (ref 0–0.9)
LYMPHOCYTES # BLD AUTO: 1.19 K/UL — SIGNIFICANT CHANGE UP (ref 1–3.3)
LYMPHOCYTES # BLD AUTO: 21.8 % — SIGNIFICANT CHANGE UP (ref 13–44)
MCHC RBC-ENTMCNC: 30.5 PG — SIGNIFICANT CHANGE UP (ref 27–34)
MCHC RBC-ENTMCNC: 35.2 G/DL — SIGNIFICANT CHANGE UP (ref 32–36)
MCV RBC AUTO: 86.9 FL — SIGNIFICANT CHANGE UP (ref 80–100)
MONOCYTES # BLD AUTO: 0.43 K/UL — SIGNIFICANT CHANGE UP (ref 0–0.9)
MONOCYTES NFR BLD AUTO: 7.9 % — SIGNIFICANT CHANGE UP (ref 2–14)
NEUTROPHILS # BLD AUTO: 3.56 K/UL — SIGNIFICANT CHANGE UP (ref 1.8–7.4)
NEUTROPHILS NFR BLD AUTO: 65.4 % — SIGNIFICANT CHANGE UP (ref 43–77)
NRBC # BLD: 0 /100 WBCS — SIGNIFICANT CHANGE UP (ref 0–0)
PLATELET # BLD AUTO: 238 K/UL — SIGNIFICANT CHANGE UP (ref 150–400)
RBC # BLD: 4.42 M/UL — SIGNIFICANT CHANGE UP (ref 4.2–5.8)
RBC # FLD: 12.3 % — SIGNIFICANT CHANGE UP (ref 10.3–14.5)
WBC # BLD: 5.45 K/UL — SIGNIFICANT CHANGE UP (ref 3.8–10.5)
WBC # FLD AUTO: 5.45 K/UL — SIGNIFICANT CHANGE UP (ref 3.8–10.5)

## 2023-10-05 PROCEDURE — 99214 OFFICE O/P EST MOD 30 MIN: CPT

## 2023-10-05 RX ORDER — HYDROCODONE BITARTRATE AND HOMATROPINE METHYLBROMIDE 5; 1.5 MG/5ML; MG/5ML
5-1.5 SOLUTION ORAL AT BEDTIME
Qty: 25 | Refills: 0 | Status: DISCONTINUED | COMMUNITY
Start: 2023-09-27 | End: 2023-10-05

## 2023-10-05 RX ORDER — BENZONATATE 100 MG/1
100 CAPSULE ORAL EVERY 8 HOURS
Qty: 30 | Refills: 3 | Status: DISCONTINUED | COMMUNITY
Start: 2023-09-21 | End: 2023-10-05

## 2023-10-05 RX ORDER — AZITHROMYCIN 250 MG/1
250 TABLET, FILM COATED ORAL
Qty: 1 | Refills: 0 | Status: DISCONTINUED | COMMUNITY
Start: 2023-09-27 | End: 2023-10-05

## 2023-10-31 ENCOUNTER — APPOINTMENT (OUTPATIENT)
Dept: INTERNAL MEDICINE | Facility: CLINIC | Age: 78
End: 2023-10-31

## 2023-11-01 ENCOUNTER — APPOINTMENT (OUTPATIENT)
Dept: ORTHOPEDIC SURGERY | Facility: CLINIC | Age: 78
End: 2023-11-01

## 2023-12-11 ENCOUNTER — NON-APPOINTMENT (OUTPATIENT)
Age: 78
End: 2023-12-11

## 2024-01-24 ENCOUNTER — APPOINTMENT (OUTPATIENT)
Dept: CARDIOLOGY | Facility: CLINIC | Age: 79
End: 2024-01-24

## 2024-02-08 ENCOUNTER — APPOINTMENT (OUTPATIENT)
Dept: MRI IMAGING | Facility: CLINIC | Age: 79
End: 2024-02-08
Payer: MEDICARE

## 2024-02-08 ENCOUNTER — OUTPATIENT (OUTPATIENT)
Dept: OUTPATIENT SERVICES | Facility: HOSPITAL | Age: 79
LOS: 1 days | End: 2024-02-08
Payer: MEDICARE

## 2024-02-08 DIAGNOSIS — Z87.09 PERSONAL HISTORY OF OTHER DISEASES OF THE RESPIRATORY SYSTEM: Chronic | ICD-10-CM

## 2024-02-08 DIAGNOSIS — Z98.890 OTHER SPECIFIED POSTPROCEDURAL STATES: Chronic | ICD-10-CM

## 2024-02-08 DIAGNOSIS — M54.12 RADICULOPATHY, CERVICAL REGION: ICD-10-CM

## 2024-02-08 DIAGNOSIS — M54.16 RADICULOPATHY, LUMBAR REGION: ICD-10-CM

## 2024-02-08 PROCEDURE — 72148 MRI LUMBAR SPINE W/O DYE: CPT | Mod: 26,MH

## 2024-02-08 PROCEDURE — 72141 MRI NECK SPINE W/O DYE: CPT | Mod: 26,MH

## 2024-02-08 PROCEDURE — 72141 MRI NECK SPINE W/O DYE: CPT | Mod: MH

## 2024-02-08 PROCEDURE — 72148 MRI LUMBAR SPINE W/O DYE: CPT | Mod: MH

## 2024-03-05 ENCOUNTER — NON-APPOINTMENT (OUTPATIENT)
Age: 79
End: 2024-03-05

## 2024-03-05 ENCOUNTER — APPOINTMENT (OUTPATIENT)
Dept: CARDIOLOGY | Facility: CLINIC | Age: 79
End: 2024-03-05
Payer: MEDICARE

## 2024-03-05 VITALS
DIASTOLIC BLOOD PRESSURE: 82 MMHG | BODY MASS INDEX: 25.48 KG/M2 | HEART RATE: 50 BPM | OXYGEN SATURATION: 99 % | SYSTOLIC BLOOD PRESSURE: 134 MMHG | WEIGHT: 172 LBS | HEIGHT: 69 IN

## 2024-03-05 VITALS — DIASTOLIC BLOOD PRESSURE: 79 MMHG | SYSTOLIC BLOOD PRESSURE: 136 MMHG | HEART RATE: 50 BPM

## 2024-03-05 VITALS — DIASTOLIC BLOOD PRESSURE: 70 MMHG | SYSTOLIC BLOOD PRESSURE: 105 MMHG | HEART RATE: 48 BPM

## 2024-03-05 DIAGNOSIS — I34.0 NONRHEUMATIC MITRAL (VALVE) INSUFFICIENCY: ICD-10-CM

## 2024-03-05 DIAGNOSIS — I95.1 ORTHOSTATIC HYPOTENSION: ICD-10-CM

## 2024-03-05 PROCEDURE — G2211 COMPLEX E/M VISIT ADD ON: CPT

## 2024-03-05 PROCEDURE — 93000 ELECTROCARDIOGRAM COMPLETE: CPT

## 2024-03-05 PROCEDURE — 99214 OFFICE O/P EST MOD 30 MIN: CPT

## 2024-03-05 NOTE — PHYSICAL EXAM
[General Appearance - Well Developed] : well developed [Well Groomed] : well groomed [Normal Appearance] : normal appearance [General Appearance - Well Nourished] : well nourished [No Deformities] : no deformities [Normal Conjunctiva] : the conjunctiva exhibited no abnormalities [General Appearance - In No Acute Distress] : no acute distress [Eyelids - No Xanthelasma] : the eyelids demonstrated no xanthelasmas [No Oral Pallor] : no oral pallor [Normal Oral Mucosa] : normal oral mucosa [No Oral Cyanosis] : no oral cyanosis [Normal Jugular Venous A Waves Present] : normal jugular venous A waves present [Normal Jugular Venous V Waves Present] : normal jugular venous V waves present [No Jugular Venous Wasserman A Waves] : no jugular venous wasserman A waves [Respiration, Rhythm And Depth] : normal respiratory rhythm and effort [Exaggerated Use Of Accessory Muscles For Inspiration] : no accessory muscle use [Auscultation Breath Sounds / Voice Sounds] : lungs were clear to auscultation bilaterally [Heart Rate And Rhythm] : heart rate and rhythm were normal [Murmurs] : no murmurs present [Heart Sounds] : normal S1 and S2 [Bradycardic ___] : the heart rate was bradycardic at [unfilled] bpm [Regular-Premature Beats] : the rhythm was regular with premature beats [Abdomen Soft] : soft [Abdomen Tenderness] : non-tender [Abdomen Mass (___ Cm)] : no abdominal mass palpated [Abnormal Walk] : normal gait [Gait - Sufficient For Exercise Testing] : the gait was sufficient for exercise testing [Nail Clubbing] : no clubbing of the fingernails [Cyanosis, Localized] : no localized cyanosis [Petechial Hemorrhages (___cm)] : no petechial hemorrhages [] : no rash [Skin Color & Pigmentation] : normal skin color and pigmentation [No Venous Stasis] : no venous stasis [Skin Lesions] : no skin lesions [No Skin Ulcers] : no skin ulcer [No Xanthoma] : no  xanthoma was observed [Oriented To Time, Place, And Person] : oriented to person, place, and time [Mood] : the mood was normal [Affect] : the affect was normal [FreeTextEntry1] : Seems less depressed, and a little less anxious.

## 2024-03-05 NOTE — REASON FOR VISIT
[FreeTextEntry1] : The patient is a 78-year-old male with a history of angina with nonobstructive coronary artery disease,  and sinus node dysfunction and iron deficiency anemia,. s/p stent to LAD.  s/p hip surgery., with orthostatic hypotension.  Last visit semiurgent visit for high blood pressure

## 2024-03-05 NOTE — DISCUSSION/SUMMARY
[FreeTextEntry1] : Check labs.  Patient to check correct dose of midodrine and as mentioned if it is already 10 mg 3 times a day I will probably add Florinef to his regimen and I warned him about some edema.  If it is still only 5 mg and or there is an issue with compliance then we will go to 10 mg 3 times a day religiously.  So far no symptoms to worry about recurrence of coronary artery disease and if his proBNP is okay no need to repeat the echo yet [EKG obtained to assist in diagnosis and management of assessed problem(s)] : EKG obtained to assist in diagnosis and management of assessed problem(s)

## 2024-03-05 NOTE — ASSESSMENT
[FreeTextEntry1] : Patient with chronic exertional angina, and angina with emotional upset that responds to nitroglycerin. Nonobstructive coronary artery disease with only 40% distal LAD back in 2012. Symptoms were stable. Echo with normal LV function and mild to moderate MR, for many years, also, stable. Has sinus node dysfunction, with sinus bradycardia, but no syncope or near-syncope. Able to walk long distances normally (now somewhat limited by hip and knee pain). (Had abdominal and inguinal hernia repair. Then for 3 weeks or so his angina was back if he goes upstairs or walks uphill or walks too fast and relieved with rest. Heart rate here was 53. Blood pressure normal. Otherwise exam unchanged. None of his medications are negatively chronotropic including Ranexa and his Parkinson's medications.) (Was somewhat depressed over his diagnosis of Parkinson's. Follows up with Dr. Sohail Arana at [facility]. Still dealing with his knee arthritis, but not ready for a replacement.) Labs were sent which ruled out anemia or any other reason that might be causing his increased angina. Long discussion with patient about possible management going forward. We increased his Ranexa to 1000 twice daily (although for reasons of his own choosing 1 day he takes 1000 twice daily and the next day he still takes 500 twice daily.) He returned for stress test October 6, 2021 which had EKG abnormalities and his usual chest pain at a low workload. Absolutely no echocardiographic evidence of ischemia post exercise. Overall stress test was not that different than 2016 except the symptoms and EKG changes, at a much lower workload. I had a long discussion with the patient and his wife and we agreed to schedule coronary angiography. A significant 70% mid LAD lesion was found and stented and the patient has done well since. Claims he is actually feeling much better. He had mild nonobstructive disease in the circumflex as well. (He has remained with a resting bradycardia in the 40s for quite some time now without any syncope or near syncope. In the past he has been able to mount a good heart rate response to exercise and I was satisfied with his heart rate response up to 111 with exercise. Issues with his hip and abdominal hernia but currently stable, was on DAPT.--now finally on single agent (clopidogrel) He had the hip replacement about 4 ago. He has had some edema on the right leg ever since and shoulder to be orthopedist and the neurologist said you should go see a cardiologist. It is minimal and he has almost none on the left leg, may be just trace by the ankle and he has a history of some venous insufficiency. There is no JVD, there is no reason to suspect right heart failure or left heart failure etc. No story or exam or findings to suggest DVT at this point in time. I reassured him that the edema is benign, probably more on the venous etiology and that he will always have some swelling on the right side and more on the right because of his surgery. I reassured him that there does not seem to be any acute cardiac issue going on. Still with the same complaint of lightheadedness. Does not sound like near syncope with transient high-grade block or periods of asystole. It is only when he changes positions and only for a few seconds. He was not really orthostatic in the office and again as mentioned his heart rate does  with exertion and exercise. (In the past I briefly considered holding his Ranexa but it seems he was getting angina and he does have an abnormal treadmill stress test although without echo evidence of ischemia. He recently had true obstructive disease and an LAD stent.) Since then his symptoms have gotten worse and clearly are orthostatic and he now has demonstrated orthostatic hypotension. After discussion with Dr. Sohail Arana of neurology we started him on Midodrine 5 mg twice daily which he is taking at 12 noon and 6 PM. Long discussion again with the patient about changing positions slowly etc. and in the meantime I would like to go up on his medication. We agreed to do 10 mg and keep it at 6 PM and 12 noon for now as he seems to like that regimen and then reevaluate in 4 weeks. For some reason he does not seem to be getting symptoms first thing in the morning. Comes back today complaining of dizziness and is only mildly orthostatic and obviously he is more depressed than anything especially with his friend dying of pancreatic cancer. His exam is unchanged and he is mildly orthostatic. Again I mentioned that the eventual options are to go up to 10 mg 3 times a day and then if still necessary could add Florinef. At first he was resistant to more medications but at the end it seemed to be willing to try 3 times a day. In addition he went off the atorvastatin 3 weeks ago because he says he was having some stomach pain but on the labs from Dr. Rossi his LDL went up to 149 when it should be below 70 so he will resume his atorvastatin and diet. He also decided to go back to clopidogrel instead of aspirin. Urgent visit August 21, 2023 because his  frightened him telling him he must see cardiologist because his blood pressure during exercise was in the 160s over 90s. I explained to the patient about high blood pressure not being an emergency, how blood pressure goes up with exercise naturally even to 200 or more and it is a normal response, etc. and I explained to him that people who have learned and know how to take blood pressure should be taught once significant and wants an emergency and what is not etc. He is totally stable. Still complains of some dizziness which is mostly mild orthostasis. Now on 10 mg of midodrine 3 times a day. Not on any medicines that would lower his blood pressure. Not eager to add Florinef etc. Okay for him to just resume his normal activities and keep his regular appointments with me. Labs were sent in addition.   He returns today in follow-up March 5, 2024.  Is still orthostatic to do at least 30 points in just a minute or so of changing positions.  First he says he takes his Parkinson's medicines religiously because he no longer has a tremor although he is a little stiff but that makes me think he may not be taking his other medications religiously.  In addition the last note from Dr. Arana in February on the medication list still says 5 mg of midodrine.  He is otherwise doing fine.  He does have sinus bradycardia at 48 which may contribute a little to his symptoms.  Labs were sent.  If he is only taking 5 mg of midodrine then we will go up to 10 3 times a day; if he is already taking 10 mg 3 times a day then I would consider Florinef if his labs are allowed it.

## 2024-03-05 NOTE — REVIEW OF SYSTEMS
[Lower Ext Edema] : lower extremity edema [Depression] : depression [Dizziness] : dizziness [Negative] : Heme/Lymph [Weight Gain (___ Lbs)] : no recent weight gain [Feeling Fatigued] : not feeling fatigued [Dyspnea on exertion] : not dyspnea during exertion [Weight Loss (___ Lbs)] : no recent weight loss [Chest Discomfort] : no chest discomfort [Palpitations] : no palpitations [Syncope] : no syncope [Abdominal Pain] : no abdominal pain [Suicidal] : not suicidal [Anxiety] : no anxiety [FreeTextEntry5] : see HPI.   [FreeTextEntry9] : s/p hip replacement [de-identified] : Parkinsonism and some orthostasis;   No tremor anymore but he does have stiffness [de-identified] : Seems a little depressed, less anxious

## 2024-03-06 LAB
ALBUMIN SERPL ELPH-MCNC: 4.3 G/DL
ALP BLD-CCNC: 79 U/L
ALT SERPL-CCNC: 12 U/L
ANION GAP SERPL CALC-SCNC: 13 MMOL/L
AST SERPL-CCNC: 16 U/L
BILIRUB SERPL-MCNC: 0.5 MG/DL
BUN SERPL-MCNC: 18 MG/DL
CALCIUM SERPL-MCNC: 9.1 MG/DL
CHLORIDE SERPL-SCNC: 99 MMOL/L
CHOLEST SERPL-MCNC: 235 MG/DL
CO2 SERPL-SCNC: 24 MMOL/L
CREAT SERPL-MCNC: 0.88 MG/DL
EGFR: 89 ML/MIN/1.73M2
ESTIMATED AVERAGE GLUCOSE: 126 MG/DL
GLUCOSE SERPL-MCNC: 110 MG/DL
HBA1C MFR BLD HPLC: 6 %
HCT VFR BLD CALC: 37.9 %
HDLC SERPL-MCNC: 59 MG/DL
HGB BLD-MCNC: 12.7 G/DL
LDLC SERPL CALC-MCNC: 137 MG/DL
MCHC RBC-ENTMCNC: 29.9 PG
MCHC RBC-ENTMCNC: 33.5 GM/DL
MCV RBC AUTO: 89.2 FL
NONHDLC SERPL-MCNC: 175 MG/DL
NT-PROBNP SERPL-MCNC: 169 PG/ML
PLATELET # BLD AUTO: 268 K/UL
POTASSIUM SERPL-SCNC: 4.3 MMOL/L
PROT SERPL-MCNC: 6.8 G/DL
RBC # BLD: 4.25 M/UL
RBC # FLD: 12.9 %
SODIUM SERPL-SCNC: 135 MMOL/L
TRIGL SERPL-MCNC: 218 MG/DL
TSH SERPL-ACNC: 1.25 UIU/ML
WBC # FLD AUTO: 6.63 K/UL

## 2024-03-26 ENCOUNTER — APPOINTMENT (OUTPATIENT)
Dept: ORTHOPEDIC SURGERY | Facility: CLINIC | Age: 79
End: 2024-03-26
Payer: MEDICARE

## 2024-03-26 VITALS — BODY MASS INDEX: 25.48 KG/M2 | WEIGHT: 172 LBS | HEIGHT: 69 IN

## 2024-03-26 DIAGNOSIS — M17.0 BILATERAL PRIMARY OSTEOARTHRITIS OF KNEE: ICD-10-CM

## 2024-03-26 PROCEDURE — 20610 DRAIN/INJ JOINT/BURSA W/O US: CPT | Mod: RT

## 2024-03-26 PROCEDURE — 73564 X-RAY EXAM KNEE 4 OR MORE: CPT | Mod: 50

## 2024-03-26 PROCEDURE — 99215 OFFICE O/P EST HI 40 MIN: CPT | Mod: 25

## 2024-03-26 NOTE — HISTORY OF PRESENT ILLNESS
[de-identified] : This is a very nice 78 year old  male experiencing  pain in both knees, right worse than left.  Has bilateral knee OA. Treatment has been cortisone and gel injections in the past, all for the right knee only. The pain is severe in intensity and has been going on for at least 1 year now. The pain somewhat limits activities of daily living. Walking tolerance is slightly reduced. The patient denies any radiation of the pain to the feet and it is not associated with numbness, tingling, or weakness.

## 2024-03-26 NOTE — PHYSICAL EXAM
[de-identified] : Well developed, well nourished in no apparent distress, awake, alert and orientated to person, place and time. with appropriate mood and affect. Respirations are even and unlabored. Gait evaluation does reveal a limp. There is no inguinal adenopathy. The legs are well-perfused, without skin lesions, shows a grossly normal motor and sensory examination. Knee motion does cause significant pain. ROM of both knees are right 20-95 degrees 5 degrees valgus. Left 0-120 degrees. 5 deg varus The knees are stable within that range-of-motion to AP and ML stress. Muscle strength is normal. Pedal pulses are palpable. [de-identified] :  Long standing  knee, AP knee, lateral knee, tunnel and patellar views of the both knees were ordered and taken in the office and demonstrate degenerative joint disease of the knee with joint space narrowing, osteophyte formation, and subchondral sclerosis.

## 2024-03-26 NOTE — DISCUSSION/SUMMARY
[de-identified] :  This patient has bilateral knee, right worse than left osteoarthritis. Severea stiffness. I had a discussion with the patient, who is a candidate for joint replacement, but he would like to repeat cortisone injection today. Risks, benefits and alternatives were discussed. At this point, the patient wants to hold off. An extensive discussion was conducted on the natural history of the disease and the variety of surgical and non-surgical options available to the patient including, but not limited to non-steroidal anti-inflammatory medications, steroid injections, physical therapy, maintenance of ideal body weight, and reduction of activity. Today we performed right knee intraarticular cortisone injection. already goes to PT so does not need a new rx today for this. Has severe right knee stiffness and he needs to work on this with PT as preop motion tends to predict postop motion. I recommend mobic for this diagnosis of bilateral knee osteoarthritis but I cannot rx this because of his heart disease. He can take tylenol for pain. The patient will schedule an appointment as needed.  Informed consent for right knee injection  was obtained.  All risks, benefits and alternatives were discussed. These included but were not limited to bleeding, infection and allergic reaction. All questions were answered. A time out was performed. Right  knee were prepped and draped in sterile fashion. Using sterile technique, 40mg of Kenalog, 4cc of 1% lidocaine, 4cc of 0.25% marcaine using a 21-gauge needle. A sterile dressing was applied. Post injection instructions were reviewed. The patient tolerated the procedure well.

## 2024-03-26 NOTE — REVIEW OF SYSTEMS
[Negative] : Heme/Lymph [Arthralgia] : arthralgia [Joint Stiffness] : joint stiffness [Joint Pain] : joint pain

## 2024-05-14 ENCOUNTER — APPOINTMENT (OUTPATIENT)
Dept: INTERNAL MEDICINE | Facility: CLINIC | Age: 79
End: 2024-05-14
Payer: MEDICARE

## 2024-05-14 VITALS
HEART RATE: 48 BPM | TEMPERATURE: 97.9 F | OXYGEN SATURATION: 99 % | HEIGHT: 69 IN | WEIGHT: 165 LBS | BODY MASS INDEX: 24.44 KG/M2

## 2024-05-14 DIAGNOSIS — I25.10 ATHEROSCLEROTIC HEART DISEASE OF NATIVE CORONARY ARTERY W/OUT ANGINA PECTORIS: ICD-10-CM

## 2024-05-14 DIAGNOSIS — E78.5 HYPERLIPIDEMIA, UNSPECIFIED: ICD-10-CM

## 2024-05-14 DIAGNOSIS — R63.4 ABNORMAL WEIGHT LOSS: ICD-10-CM

## 2024-05-14 DIAGNOSIS — G20.A1 PARKINSON'S DISEASE WITHOUT DYSKINESIA, WITHOUT MENTION OF FLUCTUATIONS: ICD-10-CM

## 2024-05-14 DIAGNOSIS — R42 DIZZINESS AND GIDDINESS: ICD-10-CM

## 2024-05-14 PROCEDURE — 99214 OFFICE O/P EST MOD 30 MIN: CPT | Mod: 25

## 2024-05-14 PROCEDURE — 36415 COLL VENOUS BLD VENIPUNCTURE: CPT

## 2024-05-16 VITALS — DIASTOLIC BLOOD PRESSURE: 75 MMHG | SYSTOLIC BLOOD PRESSURE: 110 MMHG

## 2024-05-16 PROBLEM — R42 POSTURAL DIZZINESS: Status: ACTIVE | Noted: 2018-10-09

## 2024-05-16 PROBLEM — G20.A1 PARKINSON'S DISEASE: Status: ACTIVE | Noted: 2019-11-21

## 2024-05-16 RX ORDER — RASAGILINE 1 MG/1
1 TABLET ORAL DAILY
Refills: 0 | Status: ACTIVE | COMMUNITY
Start: 2024-05-16

## 2024-05-16 RX ORDER — CARBIDOPA AND LEVODOPA 25; 100 MG/1; MG/1
25-100 TABLET ORAL 3 TIMES DAILY
Refills: 0 | Status: ACTIVE | COMMUNITY
Start: 2021-01-07

## 2024-05-16 NOTE — ASSESSMENT
[FreeTextEntry1] : The patient has chronic orthostatic hypotension and he is on Midodrine- he isn't sure of the dose but it is likely 10 mg TID now.  He isn't orthostatic now.  Continue this dose and po fluids advised.  Cardiology and neurology follow-up as needed.    He complains of weight loss.  By the chart, his weight is similar or down a few pounds.  No specific GI symptoms but appetite isn't good.  The slight weight loss might be related to his neurological condition.  Will get blood tests and a CT C/A/P to rule out other pathology.    Discussed diet, PT.

## 2024-05-16 NOTE — HISTORY OF PRESENT ILLNESS
[de-identified] : The patient continues to have dizziness and has orthostatic hypotension.  He says the BP fluctuates.  It isn't a spinning.  He has seen his neurologist and cardiologist.  He is on Midodrine.    He feels he is losing weight, now stabilized.  Appetite not as good.  No abdominal pain, N/V, diarrhea, BRBPR, dysphagia, odynophagia.  He exercises and does PT.

## 2024-05-16 NOTE — PHYSICAL EXAM
[Normal] : soft, non-tender, non-distended, no masses palpated, no HSM and normal bowel sounds [de-identified] : Parkinson's manifestations

## 2024-05-20 ENCOUNTER — RESULT REVIEW (OUTPATIENT)
Age: 79
End: 2024-05-20

## 2024-05-20 ENCOUNTER — OUTPATIENT (OUTPATIENT)
Dept: OUTPATIENT SERVICES | Facility: HOSPITAL | Age: 79
LOS: 1 days | End: 2024-05-20
Payer: MEDICARE

## 2024-05-20 ENCOUNTER — APPOINTMENT (OUTPATIENT)
Dept: CT IMAGING | Facility: CLINIC | Age: 79
End: 2024-05-20
Payer: MEDICARE

## 2024-05-20 DIAGNOSIS — R63.4 ABNORMAL WEIGHT LOSS: ICD-10-CM

## 2024-05-20 DIAGNOSIS — Z98.890 OTHER SPECIFIED POSTPROCEDURAL STATES: Chronic | ICD-10-CM

## 2024-05-20 DIAGNOSIS — Z87.09 PERSONAL HISTORY OF OTHER DISEASES OF THE RESPIRATORY SYSTEM: Chronic | ICD-10-CM

## 2024-05-20 PROCEDURE — 71260 CT THORAX DX C+: CPT | Mod: 26,MH

## 2024-05-20 PROCEDURE — 74177 CT ABD & PELVIS W/CONTRAST: CPT | Mod: 26,ME

## 2024-05-20 PROCEDURE — G1004: CPT

## 2024-05-20 PROCEDURE — 74177 CT ABD & PELVIS W/CONTRAST: CPT | Mod: ME

## 2024-05-20 PROCEDURE — 71260 CT THORAX DX C+: CPT

## 2024-05-23 ENCOUNTER — NON-APPOINTMENT (OUTPATIENT)
Age: 79
End: 2024-05-23

## 2024-05-23 LAB
ALBUMIN SERPL ELPH-MCNC: 4.3 G/DL
ALP BLD-CCNC: 78 U/L
ALT SERPL-CCNC: 10 U/L
ANION GAP SERPL CALC-SCNC: 13 MMOL/L
AST SERPL-CCNC: 17 U/L
BILIRUB SERPL-MCNC: 0.4 MG/DL
BUN SERPL-MCNC: 17 MG/DL
CALCIUM SERPL-MCNC: 8.9 MG/DL
CHLORIDE SERPL-SCNC: 99 MMOL/L
CHOLEST SERPL-MCNC: 181 MG/DL
CO2 SERPL-SCNC: 24 MMOL/L
CREAT SERPL-MCNC: 0.96 MG/DL
CRP SERPL-MCNC: <3 MG/L
EGFR: 82 ML/MIN/1.73M2
ERYTHROCYTE [SEDIMENTATION RATE] IN BLOOD BY WESTERGREN METHOD: 8 MM/HR
FERRITIN SERPL-MCNC: 121 NG/ML
FOLATE SERPL-MCNC: 11.7 NG/ML
GLUCOSE SERPL-MCNC: 117 MG/DL
HCT VFR BLD CALC: 39.5 %
HDLC SERPL-MCNC: 74 MG/DL
HGB BLD-MCNC: 13.1 G/DL
IRON SATN MFR SERPL: 19 %
IRON SERPL-MCNC: 60 UG/DL
LDLC SERPL CALC-MCNC: 85 MG/DL
MCHC RBC-ENTMCNC: 30.5 PG
MCHC RBC-ENTMCNC: 33.2 GM/DL
MCV RBC AUTO: 92.1 FL
NONHDLC SERPL-MCNC: 107 MG/DL
PLATELET # BLD AUTO: 295 K/UL
POTASSIUM SERPL-SCNC: 4.1 MMOL/L
PROT SERPL-MCNC: 6.4 G/DL
RBC # BLD: 4.29 M/UL
RBC # FLD: 13.3 %
SODIUM SERPL-SCNC: 137 MMOL/L
T4 FREE SERPL-MCNC: 1 NG/DL
TIBC SERPL-MCNC: 311 UG/DL
TRIGL SERPL-MCNC: 126 MG/DL
TSH SERPL-ACNC: 1.38 UIU/ML
UIBC SERPL-MCNC: 251 UG/DL
VIT B12 SERPL-MCNC: 418 PG/ML
WBC # FLD AUTO: 6.8 K/UL

## 2024-06-07 ENCOUNTER — APPOINTMENT (OUTPATIENT)
Dept: INTERNAL MEDICINE | Facility: CLINIC | Age: 79
End: 2024-06-07

## 2024-06-07 DIAGNOSIS — M25.461 EFFUSION, RIGHT KNEE: ICD-10-CM

## 2024-06-07 DIAGNOSIS — H61.23 IMPACTED CERUMEN, BILATERAL: ICD-10-CM

## 2024-06-21 ENCOUNTER — APPOINTMENT (OUTPATIENT)
Dept: INTERNAL MEDICINE | Facility: CLINIC | Age: 79
End: 2024-06-21
Payer: MEDICARE

## 2024-06-21 VITALS
OXYGEN SATURATION: 98 % | BODY MASS INDEX: 24.59 KG/M2 | HEIGHT: 69 IN | HEART RATE: 65 BPM | TEMPERATURE: 98.2 F | WEIGHT: 166 LBS

## 2024-06-21 VITALS — SYSTOLIC BLOOD PRESSURE: 105 MMHG | DIASTOLIC BLOOD PRESSURE: 65 MMHG

## 2024-06-21 DIAGNOSIS — R60.0 LOCALIZED EDEMA: ICD-10-CM

## 2024-06-21 PROCEDURE — 36415 COLL VENOUS BLD VENIPUNCTURE: CPT

## 2024-06-21 PROCEDURE — 99214 OFFICE O/P EST MOD 30 MIN: CPT

## 2024-06-21 NOTE — ASSESSMENT
[FreeTextEntry1] : The patient has very mild leg swelling with he has had in the past.   It is very mild and B/L.  No need for diuretics which would cause new problems.  He was advised to be active and elevate his legs later in the day.  Will check a metabolic and BNP.  Call PRN if it gets worse.  He was counseled regarding the dizziness which is the same.  The BP is acceptable at 105/65.  Call PRN.  He plans to follow-up with his neurologist.  He was counseled.  He remains active and plans trips to [place] and [facility] in the next few months.

## 2024-06-21 NOTE — HISTORY OF PRESENT ILLNESS
[de-identified] : The patient has B/L lower leg swelling and he is concerned.  His legs feels stiff and there is swelling.  He says he has had it for a few months but it's worse in the last week. It is B/L, possibly worse on the right. There is mild pain.  It is worse later in the day. No chest pain, dyspnea, orthopnea.  His diet is the same.  He continues to have chronic dizziness without change.

## 2024-06-21 NOTE — PHYSICAL EXAM
[Normal] : soft, non-tender, non-distended, no masses palpated, no HSM and normal bowel sounds [de-identified] : trace edema to lower shin B/L, 2+ pulses

## 2024-06-23 ENCOUNTER — NON-APPOINTMENT (OUTPATIENT)
Age: 79
End: 2024-06-23

## 2024-06-23 LAB
ALBUMIN SERPL ELPH-MCNC: 4.5 G/DL
ALP BLD-CCNC: 80 U/L
ALT SERPL-CCNC: 8 U/L
ANION GAP SERPL CALC-SCNC: 13 MMOL/L
AST SERPL-CCNC: 12 U/L
BILIRUB SERPL-MCNC: 0.5 MG/DL
BUN SERPL-MCNC: 20 MG/DL
CALCIUM SERPL-MCNC: 9 MG/DL
CHLORIDE SERPL-SCNC: 103 MMOL/L
CO2 SERPL-SCNC: 22 MMOL/L
CREAT SERPL-MCNC: 0.93 MG/DL
EGFR: 85 ML/MIN/1.73M2
GLUCOSE SERPL-MCNC: 117 MG/DL
NT-PROBNP SERPL-MCNC: 180 PG/ML
POTASSIUM SERPL-SCNC: 4 MMOL/L
PROT SERPL-MCNC: 6.8 G/DL
SODIUM SERPL-SCNC: 138 MMOL/L

## 2024-06-25 RX ORDER — MIDODRINE HYDROCHLORIDE 10 MG/1
10 TABLET ORAL 4 TIMES DAILY
Qty: 120 | Refills: 5 | Status: ACTIVE | COMMUNITY
Start: 2023-05-17

## 2024-07-05 ENCOUNTER — APPOINTMENT (OUTPATIENT)
Dept: INTERNAL MEDICINE | Facility: CLINIC | Age: 79
End: 2024-07-05

## 2024-07-05 VITALS
WEIGHT: 166 LBS | BODY MASS INDEX: 24.59 KG/M2 | HEART RATE: 50 BPM | HEIGHT: 69 IN | TEMPERATURE: 98.9 F | OXYGEN SATURATION: 98 %

## 2024-07-05 DIAGNOSIS — R73.09 OTHER ABNORMAL GLUCOSE: ICD-10-CM

## 2024-07-05 DIAGNOSIS — E78.5 HYPERLIPIDEMIA, UNSPECIFIED: ICD-10-CM

## 2024-07-05 DIAGNOSIS — D50.9 IRON DEFICIENCY ANEMIA, UNSPECIFIED: ICD-10-CM

## 2024-07-05 DIAGNOSIS — E87.1 HYPO-OSMOLALITY AND HYPONATREMIA: ICD-10-CM

## 2024-07-05 PROCEDURE — 36415 COLL VENOUS BLD VENIPUNCTURE: CPT

## 2024-07-05 PROCEDURE — G0439: CPT

## 2024-07-06 LAB
ALBUMIN SERPL ELPH-MCNC: 4.4 G/DL
ALP BLD-CCNC: 78 U/L
ALT SERPL-CCNC: 9 U/L
ANION GAP SERPL CALC-SCNC: 12 MMOL/L
AST SERPL-CCNC: 13 U/L
BILIRUB SERPL-MCNC: 0.7 MG/DL
BUN SERPL-MCNC: 20 MG/DL
CALCIUM SERPL-MCNC: 9.1 MG/DL
CHLORIDE SERPL-SCNC: 100 MMOL/L
CHOLEST SERPL-MCNC: 189 MG/DL
CO2 SERPL-SCNC: 23 MMOL/L
CREAT SERPL-MCNC: 0.89 MG/DL
EGFR: 89 ML/MIN/1.73M2
ESTIMATED AVERAGE GLUCOSE: 126 MG/DL
FERRITIN SERPL-MCNC: 137 NG/ML
GLUCOSE SERPL-MCNC: 89 MG/DL
HBA1C MFR BLD HPLC: 6 %
HCT VFR BLD CALC: 38.1 %
HDLC SERPL-MCNC: 63 MG/DL
HGB BLD-MCNC: 12.8 G/DL
IRON SATN MFR SERPL: 11 %
IRON SERPL-MCNC: 34 UG/DL
LDLC SERPL CALC-MCNC: 103 MG/DL
MCHC RBC-ENTMCNC: 31.1 PG
MCHC RBC-ENTMCNC: 33.6 GM/DL
MCV RBC AUTO: 92.5 FL
NONHDLC SERPL-MCNC: 126 MG/DL
PLATELET # BLD AUTO: 259 K/UL
POTASSIUM SERPL-SCNC: 4.4 MMOL/L
PROT SERPL-MCNC: 6.7 G/DL
PSA SERPL-MCNC: 0.83 NG/ML
RBC # BLD: 4.12 M/UL
RBC # FLD: 13.2 %
SODIUM SERPL-SCNC: 135 MMOL/L
T4 FREE SERPL-MCNC: 0.9 NG/DL
TIBC SERPL-MCNC: 310 UG/DL
TRIGL SERPL-MCNC: 132 MG/DL
TSH SERPL-ACNC: 1.31 UIU/ML
UIBC SERPL-MCNC: 276 UG/DL
WBC # FLD AUTO: 8.52 K/UL

## 2024-07-13 LAB
APPEARANCE: CLEAR
BACTERIA: NEGATIVE /HPF
BILIRUBIN URINE: NEGATIVE
BLOOD URINE: NEGATIVE
CAST: 0 /LPF
COLOR: YELLOW
EPITHELIAL CELLS: 0 /HPF
GLUCOSE QUALITATIVE U: NEGATIVE MG/DL
KETONES URINE: NEGATIVE MG/DL
LEUKOCYTE ESTERASE URINE: NEGATIVE
MICROSCOPIC-UA: NORMAL
NITRITE URINE: NEGATIVE
PH URINE: 6
PROTEIN URINE: NEGATIVE MG/DL
RED BLOOD CELLS URINE: 2 /HPF
SPECIFIC GRAVITY URINE: 1.01
UROBILINOGEN URINE: 1 MG/DL
WHITE BLOOD CELLS URINE: 0 /HPF

## 2024-07-16 ENCOUNTER — APPOINTMENT (OUTPATIENT)
Dept: ORTHOPEDIC SURGERY | Facility: CLINIC | Age: 79
End: 2024-07-16

## 2024-07-16 ENCOUNTER — OFFICE (OUTPATIENT)
Dept: URBAN - METROPOLITAN AREA CLINIC 27 | Facility: CLINIC | Age: 79
Setting detail: OPHTHALMOLOGY
End: 2024-07-16
Payer: MEDICARE

## 2024-07-16 DIAGNOSIS — H18.413: ICD-10-CM

## 2024-07-16 DIAGNOSIS — H25.13: ICD-10-CM

## 2024-07-16 PROCEDURE — 99204 OFFICE O/P NEW MOD 45 MIN: CPT | Performed by: OPHTHALMOLOGY

## 2024-07-16 ASSESSMENT — CONFRONTATIONAL VISUAL FIELD TEST (CVF)
OS_FINDINGS: FULL
OD_FINDINGS: FULL

## 2024-07-19 RX ORDER — FLUDROCORTISONE ACETATE 0.1 MG/1
0.1 TABLET ORAL
Refills: 0 | Status: ACTIVE | COMMUNITY
Start: 2024-07-19

## 2024-08-06 ENCOUNTER — APPOINTMENT (OUTPATIENT)
Dept: ORTHOPEDIC SURGERY | Facility: CLINIC | Age: 79
End: 2024-08-06

## 2024-08-06 PROCEDURE — 99214 OFFICE O/P EST MOD 30 MIN: CPT | Mod: 25

## 2024-08-06 PROCEDURE — 20610 DRAIN/INJ JOINT/BURSA W/O US: CPT | Mod: RT

## 2024-08-06 NOTE — HISTORY OF PRESENT ILLNESS
[de-identified] : This is a very nice 78 year old  male experiencing  pain in both knees, right worse than left.  Has bilateral knee OA, R>L. Treatment has been cortisone and gel injections in the past, all for the right knee only. The pain is severe in intensity and has been going on for at least 1 year now. The pain somewhat limits activities of daily living. Walking tolerance is slightly reduced. Prior cortisone shots helped. The patient denies any radiation of the pain to the feet and it is not associated with numbness, tingling, or weakness.

## 2024-08-06 NOTE — PHYSICAL EXAM
[de-identified] : Well developed, well nourished in no apparent distress, awake, alert and orientated to person, place and time. with appropriate mood and affect. Respirations are even and unlabored. Gait evaluation does reveal a limp. There is no inguinal adenopathy. The legs are well-perfused, without skin lesions, shows a grossly normal motor and sensory examination. Knee motion does cause significant pain. ROM of both knees are right 20-95 degrees 5 degrees valgus. Left 0-120 degrees. 5 deg varus The knees are stable within that range-of-motion to AP and ML stress. Muscle strength is normal. Pedal pulses are palpable.

## 2024-08-06 NOTE — DISCUSSION/SUMMARY
[de-identified] :  This patient has bilateral knee, right worse than left osteoarthritis. Severea stiffness. I had a discussion with the patient, who is a candidate for joint replacement, but he would like to repeat cortisone injection today. Risks, benefits and alternatives were discussed. At this point, the patient wants to hold off. An extensive discussion was conducted on the natural history of the disease and the variety of surgical and non-surgical options available to the patient including, but not limited to non-steroidal anti-inflammatory medications, steroid injections, physical therapy, maintenance of ideal body weight, and reduction of activity. Today we performed right knee intraarticular cortisone injection. PT prescribed. Has severe right knee stiffness and he needs to work on this with PT as preop motion tends to predict postop motion. I recommend mobic for this diagnosis of bilateral knee osteoarthritis but I cannot rx this because of his heart disease. He can take tylenol for pain. The patient will schedule an appointment as needed.  Informed consent for right knee injection  was obtained.  All risks, benefits and alternatives were discussed. These included but were not limited to bleeding, infection and allergic reaction. All questions were answered. A time out was performed. Right  knee were prepped and draped in sterile fashion. Using sterile technique, 40mg of Kenalog, 4cc of 1% lidocaine, 4cc of 0.25% marcaine using a 21-gauge needle. A sterile dressing was applied. Post injection instructions were reviewed. The patient tolerated the procedure well.

## 2024-08-29 ENCOUNTER — APPOINTMENT (OUTPATIENT)
Dept: CARDIOLOGY | Facility: CLINIC | Age: 79
End: 2024-08-29
Payer: MEDICARE

## 2024-08-29 ENCOUNTER — NON-APPOINTMENT (OUTPATIENT)
Age: 79
End: 2024-08-29

## 2024-08-29 VITALS
BODY MASS INDEX: 24.88 KG/M2 | SYSTOLIC BLOOD PRESSURE: 130 MMHG | WEIGHT: 168 LBS | HEIGHT: 69 IN | DIASTOLIC BLOOD PRESSURE: 78 MMHG | HEART RATE: 50 BPM | OXYGEN SATURATION: 98 %

## 2024-08-29 DIAGNOSIS — R00.1 BRADYCARDIA, UNSPECIFIED: ICD-10-CM

## 2024-08-29 DIAGNOSIS — R00.2 PALPITATIONS: ICD-10-CM

## 2024-08-29 DIAGNOSIS — R42 DIZZINESS AND GIDDINESS: ICD-10-CM

## 2024-08-29 PROCEDURE — 93000 ELECTROCARDIOGRAM COMPLETE: CPT

## 2024-08-29 PROCEDURE — 99215 OFFICE O/P EST HI 40 MIN: CPT

## 2024-08-30 PROBLEM — R42 DIZZINESS: Status: ACTIVE | Noted: 2024-08-30

## 2024-08-30 NOTE — END OF VISIT
[Time Spent: ___ minutes] : I have spent [unfilled] minutes of time on the encounter which excludes teaching and separately reported services. [TextEntry] : I spent 20 minutes in face-to-face contact with the patient and 20 non face time minutes in support of this visit, including the following: Preparing to see the patient (review of tests/outside records) Obtaining and/or reviewing separately obtained history Counseling and educating the patient/family/caregiver Ordering medications; tests; or procedures Documenting clinical information in the medical record/chart Independently interpreting results (not separately reported) and communicating results to the patient/family/caregiver

## 2024-08-30 NOTE — PHYSICAL EXAM
[TextEntry] : Gen: NAD, breathing comfortably HEENT: MMM, anicteric sclera Neck: JVP <10 cm. No carotid bruits Cardiac: RRR, no m/r/g Lungs: CTAB, adequate inspiratory effort. No wheezes or rhonchi Abd: soft, NT/ND. No abd bruit Ext: warm and well perfused. Trace pedal edema

## 2024-08-30 NOTE — HISTORY OF PRESENT ILLNESS
[FreeTextEntry1] : CARDIOLOGY FOLLOW-UP VISIT    Dear Dr. MERCY WHEELER   I had the pleasure of seeing Mr. 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 Celis. He reports worsening dizziness in the last few weeks manifested as his balance being off which can come on suddenly. He swims almost everyday and also does walking in the pool and has not noted any decrease in exercise tolerance. Denies chest pain. Denies palpitations. He has noticed some trace ankle swelling if he has been sitting or standing for a long time. Denies orthopnea or PND. He denies bleeding and does not report melena. +nightmares which he says he was told could be due to the Parkinson's

## 2024-08-30 NOTE — DISCUSSION/SUMMARY
[___ Week(s)] : in [unfilled] week(s) [With ___] : with [unfilled] [EKG obtained to assist in diagnosis and management of assessed problem(s)] : EKG obtained to assist in diagnosis and management of assessed problem(s) [FreeTextEntry1] : With the TTE and monitor done prior.

## 2024-09-10 ENCOUNTER — RX RENEWAL (OUTPATIENT)
Age: 79
End: 2024-09-10

## 2024-09-13 ENCOUNTER — APPOINTMENT (OUTPATIENT)
Dept: CARDIOLOGY | Facility: CLINIC | Age: 79
End: 2024-09-13
Payer: MEDICARE

## 2024-09-13 PROCEDURE — ZZZZZ: CPT

## 2024-09-13 PROCEDURE — 93306 TTE W/DOPPLER COMPLETE: CPT

## 2024-09-23 DIAGNOSIS — D50.9 IRON DEFICIENCY ANEMIA, UNSPECIFIED: ICD-10-CM

## 2024-10-08 ENCOUNTER — APPOINTMENT (OUTPATIENT)
Dept: HEMATOLOGY ONCOLOGY | Facility: CLINIC | Age: 79
End: 2024-10-08

## 2024-10-24 ENCOUNTER — NON-APPOINTMENT (OUTPATIENT)
Age: 79
End: 2024-10-24

## 2024-10-29 ENCOUNTER — APPOINTMENT (OUTPATIENT)
Dept: INTERNAL MEDICINE | Facility: CLINIC | Age: 79
End: 2024-10-29

## 2024-12-20 ENCOUNTER — APPOINTMENT (OUTPATIENT)
Dept: ORTHOPEDIC SURGERY | Facility: CLINIC | Age: 79
End: 2024-12-20
Payer: MEDICARE

## 2024-12-20 VITALS — HEIGHT: 69 IN | BODY MASS INDEX: 23.99 KG/M2 | WEIGHT: 162 LBS

## 2024-12-20 DIAGNOSIS — M17.0 BILATERAL PRIMARY OSTEOARTHRITIS OF KNEE: ICD-10-CM

## 2024-12-20 PROCEDURE — 73560 X-RAY EXAM OF KNEE 1 OR 2: CPT | Mod: LT

## 2024-12-20 PROCEDURE — 99214 OFFICE O/P EST MOD 30 MIN: CPT | Mod: 25

## 2024-12-20 PROCEDURE — 73564 X-RAY EXAM KNEE 4 OR MORE: CPT | Mod: RT

## 2024-12-20 PROCEDURE — 20610 DRAIN/INJ JOINT/BURSA W/O US: CPT | Mod: RT

## 2025-01-28 ENCOUNTER — APPOINTMENT (OUTPATIENT)
Dept: ORTHOPEDIC SURGERY | Facility: CLINIC | Age: 80
End: 2025-01-28

## 2025-02-03 ENCOUNTER — NON-APPOINTMENT (OUTPATIENT)
Age: 80
End: 2025-02-03

## 2025-02-03 ENCOUNTER — APPOINTMENT (OUTPATIENT)
Dept: CARDIOLOGY | Facility: CLINIC | Age: 80
End: 2025-02-03
Payer: MEDICARE

## 2025-02-03 VITALS — SYSTOLIC BLOOD PRESSURE: 134 MMHG | DIASTOLIC BLOOD PRESSURE: 82 MMHG

## 2025-02-03 VITALS
OXYGEN SATURATION: 99 % | HEART RATE: 52 BPM | HEIGHT: 69 IN | BODY MASS INDEX: 24.14 KG/M2 | WEIGHT: 163 LBS | DIASTOLIC BLOOD PRESSURE: 82 MMHG | SYSTOLIC BLOOD PRESSURE: 134 MMHG

## 2025-02-03 DIAGNOSIS — R00.1 BRADYCARDIA, UNSPECIFIED: ICD-10-CM

## 2025-02-03 DIAGNOSIS — D50.9 IRON DEFICIENCY ANEMIA, UNSPECIFIED: ICD-10-CM

## 2025-02-03 DIAGNOSIS — R42 DIZZINESS AND GIDDINESS: ICD-10-CM

## 2025-02-03 DIAGNOSIS — I25.10 ATHEROSCLEROTIC HEART DISEASE OF NATIVE CORONARY ARTERY W/OUT ANGINA PECTORIS: ICD-10-CM

## 2025-02-03 PROCEDURE — 99214 OFFICE O/P EST MOD 30 MIN: CPT

## 2025-02-03 PROCEDURE — 93000 ELECTROCARDIOGRAM COMPLETE: CPT

## 2025-02-03 PROCEDURE — G2211 COMPLEX E/M VISIT ADD ON: CPT

## 2025-02-04 ENCOUNTER — APPOINTMENT (OUTPATIENT)
Dept: ORTHOPEDIC SURGERY | Facility: CLINIC | Age: 80
End: 2025-02-04
Payer: MEDICARE

## 2025-02-04 VITALS — BODY MASS INDEX: 24.14 KG/M2 | HEIGHT: 69 IN | WEIGHT: 163 LBS

## 2025-02-04 LAB
ALBUMIN SERPL ELPH-MCNC: 4.2 G/DL
ALP BLD-CCNC: 72 U/L
ALT SERPL-CCNC: 9 U/L
ANION GAP SERPL CALC-SCNC: 11 MMOL/L
AST SERPL-CCNC: 15 U/L
BASOPHILS # BLD AUTO: 0.03 K/UL
BASOPHILS NFR BLD AUTO: 0.5 %
BILIRUB SERPL-MCNC: 0.6 MG/DL
BUN SERPL-MCNC: 16 MG/DL
CALCIUM SERPL-MCNC: 9 MG/DL
CHLORIDE SERPL-SCNC: 102 MMOL/L
CHOLEST SERPL-MCNC: 257 MG/DL
CO2 SERPL-SCNC: 25 MMOL/L
CREAT SERPL-MCNC: 0.84 MG/DL
EGFR: 90 ML/MIN/1.73M2
EOSINOPHIL # BLD AUTO: 0.14 K/UL
EOSINOPHIL NFR BLD AUTO: 2.4 %
ESTIMATED AVERAGE GLUCOSE: 134 MG/DL
FERRITIN SERPL-MCNC: 100 NG/ML
GLUCOSE SERPL-MCNC: 134 MG/DL
HBA1C MFR BLD HPLC: 6.3 %
HCT VFR BLD CALC: 38 %
HDLC SERPL-MCNC: 64 MG/DL
HGB BLD-MCNC: 12.8 G/DL
IMM GRANULOCYTES NFR BLD AUTO: 0.2 %
IRON SATN MFR SERPL: 35 %
IRON SERPL-MCNC: 103 UG/DL
LDLC SERPL CALC-MCNC: 162 MG/DL
LYMPHOCYTES # BLD AUTO: 1.05 K/UL
LYMPHOCYTES NFR BLD AUTO: 18.3 %
MAN DIFF?: NORMAL
MCHC RBC-ENTMCNC: 29.7 PG
MCHC RBC-ENTMCNC: 33.7 G/DL
MCV RBC AUTO: 88.2 FL
MONOCYTES # BLD AUTO: 0.43 K/UL
MONOCYTES NFR BLD AUTO: 7.5 %
NEUTROPHILS # BLD AUTO: 4.09 K/UL
NEUTROPHILS NFR BLD AUTO: 71.1 %
NONHDLC SERPL-MCNC: 193 MG/DL
NT-PROBNP SERPL-MCNC: 351 PG/ML
PLATELET # BLD AUTO: 277 K/UL
POTASSIUM SERPL-SCNC: 3.9 MMOL/L
PROT SERPL-MCNC: 6.7 G/DL
RBC # BLD: 4.31 M/UL
RBC # FLD: 13 %
SODIUM SERPL-SCNC: 139 MMOL/L
TIBC SERPL-MCNC: 297 UG/DL
TRIGL SERPL-MCNC: 174 MG/DL
TSH SERPL-ACNC: 1.46 UIU/ML
UIBC SERPL-MCNC: 195 UG/DL
WBC # FLD AUTO: 5.75 K/UL

## 2025-02-04 PROCEDURE — 20610 DRAIN/INJ JOINT/BURSA W/O US: CPT | Mod: RT

## 2025-02-08 ENCOUNTER — RX RENEWAL (OUTPATIENT)
Age: 80
End: 2025-02-08

## 2025-02-11 ENCOUNTER — APPOINTMENT (OUTPATIENT)
Dept: ORTHOPEDIC SURGERY | Facility: CLINIC | Age: 80
End: 2025-02-11
Payer: MEDICARE

## 2025-02-11 ENCOUNTER — APPOINTMENT (OUTPATIENT)
Dept: INTERNAL MEDICINE | Facility: CLINIC | Age: 80
End: 2025-02-11
Payer: MEDICARE

## 2025-02-11 VITALS
HEART RATE: 67 BPM | WEIGHT: 158 LBS | HEIGHT: 69 IN | OXYGEN SATURATION: 99 % | BODY MASS INDEX: 23.4 KG/M2 | TEMPERATURE: 98.2 F

## 2025-02-11 VITALS — SYSTOLIC BLOOD PRESSURE: 125 MMHG | DIASTOLIC BLOOD PRESSURE: 78 MMHG

## 2025-02-11 DIAGNOSIS — N40.1 BENIGN PROSTATIC HYPERPLASIA WITH LOWER URINARY TRACT SYMPMS: ICD-10-CM

## 2025-02-11 PROCEDURE — G2211 COMPLEX E/M VISIT ADD ON: CPT

## 2025-02-11 PROCEDURE — 20610 DRAIN/INJ JOINT/BURSA W/O US: CPT | Mod: RT

## 2025-02-11 PROCEDURE — 99214 OFFICE O/P EST MOD 30 MIN: CPT

## 2025-02-11 RX ORDER — FINASTERIDE 5 MG/1
5 TABLET, FILM COATED ORAL
Qty: 90 | Refills: 3 | Status: ACTIVE | COMMUNITY
Start: 2025-02-11 | End: 1900-01-01

## 2025-02-12 ENCOUNTER — APPOINTMENT (OUTPATIENT)
Dept: INTERNAL MEDICINE | Facility: CLINIC | Age: 80
End: 2025-02-12

## 2025-02-14 ENCOUNTER — APPOINTMENT (OUTPATIENT)
Dept: INTERNAL MEDICINE | Facility: CLINIC | Age: 80
End: 2025-02-14
Payer: MEDICARE

## 2025-02-14 PROCEDURE — 36415 COLL VENOUS BLD VENIPUNCTURE: CPT

## 2025-02-15 LAB
APPEARANCE: CLEAR
BACTERIA: NEGATIVE /HPF
BILIRUBIN URINE: NEGATIVE
BLOOD URINE: NEGATIVE
CAST: 1 /LPF
COLOR: NORMAL
CRP SERPL-MCNC: <3 MG/L
EPITHELIAL CELLS: 0 /HPF
ERYTHROCYTE [SEDIMENTATION RATE] IN BLOOD BY WESTERGREN METHOD: 11 MM/HR
GLUCOSE QUALITATIVE U: NEGATIVE MG/DL
KETONES URINE: ABNORMAL MG/DL
LEUKOCYTE ESTERASE URINE: ABNORMAL
MICROSCOPIC-UA: NORMAL
NITRITE URINE: NEGATIVE
PH URINE: 5.5
PROTEIN URINE: NORMAL MG/DL
PSA SERPL-MCNC: 0.36 NG/ML
RED BLOOD CELLS URINE: 3 /HPF
SPECIFIC GRAVITY URINE: 1.03
UROBILINOGEN URINE: 1 MG/DL
WHITE BLOOD CELLS URINE: 0 /HPF

## 2025-02-16 LAB — BACTERIA UR CULT: NORMAL

## 2025-02-18 ENCOUNTER — APPOINTMENT (OUTPATIENT)
Dept: ORTHOPEDIC SURGERY | Facility: CLINIC | Age: 80
End: 2025-02-18

## 2025-02-21 ENCOUNTER — APPOINTMENT (OUTPATIENT)
Dept: ORTHOPEDIC SURGERY | Facility: CLINIC | Age: 80
End: 2025-02-21
Payer: MEDICARE

## 2025-02-21 VITALS — BODY MASS INDEX: 23.4 KG/M2 | WEIGHT: 158 LBS | HEIGHT: 69 IN

## 2025-02-21 DIAGNOSIS — M17.11 UNILATERAL PRIMARY OSTEOARTHRITIS, RIGHT KNEE: ICD-10-CM

## 2025-02-21 PROCEDURE — 20610 DRAIN/INJ JOINT/BURSA W/O US: CPT | Mod: RT

## 2025-03-04 ENCOUNTER — APPOINTMENT (OUTPATIENT)
Dept: CARDIOLOGY | Facility: CLINIC | Age: 80
End: 2025-03-04
Payer: MEDICARE

## 2025-03-04 VITALS — DIASTOLIC BLOOD PRESSURE: 77 MMHG | HEART RATE: 68 BPM | SYSTOLIC BLOOD PRESSURE: 119 MMHG

## 2025-03-04 VITALS — DIASTOLIC BLOOD PRESSURE: 83 MMHG | SYSTOLIC BLOOD PRESSURE: 164 MMHG | HEART RATE: 50 BPM

## 2025-03-04 VITALS
BODY MASS INDEX: 24.73 KG/M2 | SYSTOLIC BLOOD PRESSURE: 120 MMHG | OXYGEN SATURATION: 98 % | WEIGHT: 167 LBS | DIASTOLIC BLOOD PRESSURE: 82 MMHG | HEART RATE: 68 BPM | HEIGHT: 69 IN

## 2025-03-04 VITALS — DIASTOLIC BLOOD PRESSURE: 86 MMHG | SYSTOLIC BLOOD PRESSURE: 128 MMHG

## 2025-03-04 VITALS — DIASTOLIC BLOOD PRESSURE: 82 MMHG | SYSTOLIC BLOOD PRESSURE: 124 MMHG

## 2025-03-04 VITALS — SYSTOLIC BLOOD PRESSURE: 135 MMHG | DIASTOLIC BLOOD PRESSURE: 84 MMHG | HEART RATE: 56 BPM

## 2025-03-04 DIAGNOSIS — I25.10 ATHEROSCLEROTIC HEART DISEASE OF NATIVE CORONARY ARTERY W/OUT ANGINA PECTORIS: ICD-10-CM

## 2025-03-04 DIAGNOSIS — E78.5 HYPERLIPIDEMIA, UNSPECIFIED: ICD-10-CM

## 2025-03-04 DIAGNOSIS — D50.9 IRON DEFICIENCY ANEMIA, UNSPECIFIED: ICD-10-CM

## 2025-03-04 DIAGNOSIS — I95.1 ORTHOSTATIC HYPOTENSION: ICD-10-CM

## 2025-03-04 PROCEDURE — G2211 COMPLEX E/M VISIT ADD ON: CPT

## 2025-03-04 PROCEDURE — 99214 OFFICE O/P EST MOD 30 MIN: CPT

## 2025-03-05 LAB
ALBUMIN SERPL ELPH-MCNC: 4.2 G/DL
ALP BLD-CCNC: 86 U/L
ALT SERPL-CCNC: 18 U/L
ANION GAP SERPL CALC-SCNC: 10 MMOL/L
AST SERPL-CCNC: 16 U/L
BASOPHILS # BLD AUTO: 0.05 K/UL
BASOPHILS NFR BLD AUTO: 0.9 %
BILIRUB SERPL-MCNC: 0.6 MG/DL
BUN SERPL-MCNC: 18 MG/DL
CALCIUM SERPL-MCNC: 8.9 MG/DL
CHLORIDE SERPL-SCNC: 105 MMOL/L
CHOLEST SERPL-MCNC: 131 MG/DL
CO2 SERPL-SCNC: 26 MMOL/L
CREAT SERPL-MCNC: 0.87 MG/DL
EGFRCR SERPLBLD CKD-EPI 2021: 89 ML/MIN/1.73M2
EOSINOPHIL # BLD AUTO: 0.16 K/UL
EOSINOPHIL NFR BLD AUTO: 2.9 %
GLUCOSE SERPL-MCNC: 144 MG/DL
HCT VFR BLD CALC: 37.9 %
HDLC SERPL-MCNC: 65 MG/DL
HGB BLD-MCNC: 12.7 G/DL
IMM GRANULOCYTES NFR BLD AUTO: 0.5 %
LDLC SERPL CALC-MCNC: 48 MG/DL
LYMPHOCYTES # BLD AUTO: 1.12 K/UL
LYMPHOCYTES NFR BLD AUTO: 20.3 %
MAN DIFF?: NORMAL
MCHC RBC-ENTMCNC: 30.5 PG
MCHC RBC-ENTMCNC: 33.5 G/DL
MCV RBC AUTO: 91.1 FL
MONOCYTES # BLD AUTO: 0.37 K/UL
MONOCYTES NFR BLD AUTO: 6.7 %
NEUTROPHILS # BLD AUTO: 3.8 K/UL
NEUTROPHILS NFR BLD AUTO: 68.7 %
NONHDLC SERPL-MCNC: 66 MG/DL
NT-PROBNP SERPL-MCNC: 439 PG/ML
PLATELET # BLD AUTO: 277 K/UL
POTASSIUM SERPL-SCNC: 4.3 MMOL/L
PROT SERPL-MCNC: 6.5 G/DL
RBC # BLD: 4.16 M/UL
RBC # FLD: 13.3 %
SODIUM SERPL-SCNC: 141 MMOL/L
TRIGL SERPL-MCNC: 94 MG/DL
WBC # FLD AUTO: 5.53 K/UL

## 2025-04-04 ENCOUNTER — APPOINTMENT (OUTPATIENT)
Dept: CARDIOLOGY | Facility: CLINIC | Age: 80
End: 2025-04-04
Payer: MEDICARE

## 2025-04-04 VITALS — OXYGEN SATURATION: 99 % | HEART RATE: 50 BPM

## 2025-04-04 VITALS — DIASTOLIC BLOOD PRESSURE: 70 MMHG | SYSTOLIC BLOOD PRESSURE: 130 MMHG

## 2025-04-04 VITALS — SYSTOLIC BLOOD PRESSURE: 140 MMHG | DIASTOLIC BLOOD PRESSURE: 90 MMHG

## 2025-04-04 VITALS — SYSTOLIC BLOOD PRESSURE: 160 MMHG | DIASTOLIC BLOOD PRESSURE: 90 MMHG

## 2025-04-04 DIAGNOSIS — R42 DIZZINESS AND GIDDINESS: ICD-10-CM

## 2025-04-04 DIAGNOSIS — R03.0 ELEVATED BLOOD-PRESSURE READING, W/OUT DIAGNOSIS OF HYPERTENSION: ICD-10-CM

## 2025-04-04 DIAGNOSIS — I95.1 ORTHOSTATIC HYPOTENSION: ICD-10-CM

## 2025-04-04 PROCEDURE — G2211 COMPLEX E/M VISIT ADD ON: CPT

## 2025-04-04 PROCEDURE — 99214 OFFICE O/P EST MOD 30 MIN: CPT

## 2025-04-10 ENCOUNTER — APPOINTMENT (OUTPATIENT)
Dept: VASCULAR SURGERY | Facility: CLINIC | Age: 80
End: 2025-04-10
Payer: MEDICARE

## 2025-04-10 VITALS
DIASTOLIC BLOOD PRESSURE: 98 MMHG | TEMPERATURE: 97.9 F | BODY MASS INDEX: 23.16 KG/M2 | SYSTOLIC BLOOD PRESSURE: 138 MMHG | HEART RATE: 60 BPM | WEIGHT: 160 LBS | HEIGHT: 69.5 IN

## 2025-04-10 VITALS — DIASTOLIC BLOOD PRESSURE: 79 MMHG | SYSTOLIC BLOOD PRESSURE: 130 MMHG | HEART RATE: 61 BPM

## 2025-04-10 PROCEDURE — 99212 OFFICE O/P EST SF 10 MIN: CPT

## 2025-04-10 PROCEDURE — 93970 EXTREMITY STUDY: CPT

## 2025-04-14 ENCOUNTER — RX RENEWAL (OUTPATIENT)
Age: 80
End: 2025-04-14

## 2025-05-23 ENCOUNTER — APPOINTMENT (OUTPATIENT)
Dept: ORTHOPEDIC SURGERY | Facility: CLINIC | Age: 80
End: 2025-05-23
Payer: MEDICARE

## 2025-05-23 VITALS — HEIGHT: 69.5 IN | WEIGHT: 157 LBS | BODY MASS INDEX: 22.73 KG/M2

## 2025-05-23 DIAGNOSIS — M17.0 BILATERAL PRIMARY OSTEOARTHRITIS OF KNEE: ICD-10-CM

## 2025-05-23 PROCEDURE — 73560 X-RAY EXAM OF KNEE 1 OR 2: CPT | Mod: LT

## 2025-05-23 PROCEDURE — 20610 DRAIN/INJ JOINT/BURSA W/O US: CPT | Mod: RT

## 2025-05-23 PROCEDURE — 73564 X-RAY EXAM KNEE 4 OR MORE: CPT | Mod: RT

## 2025-05-23 PROCEDURE — 99214 OFFICE O/P EST MOD 30 MIN: CPT | Mod: 25

## 2025-06-10 ENCOUNTER — RX RENEWAL (OUTPATIENT)
Age: 80
End: 2025-06-10

## 2025-06-16 ENCOUNTER — NON-APPOINTMENT (OUTPATIENT)
Age: 80
End: 2025-06-16

## 2025-06-16 ENCOUNTER — APPOINTMENT (OUTPATIENT)
Dept: CARDIOLOGY | Facility: CLINIC | Age: 80
End: 2025-06-16
Payer: MEDICARE

## 2025-06-16 VITALS — DIASTOLIC BLOOD PRESSURE: 78 MMHG | SYSTOLIC BLOOD PRESSURE: 120 MMHG | HEART RATE: 61 BPM | OXYGEN SATURATION: 100 %

## 2025-06-16 PROCEDURE — 93000 ELECTROCARDIOGRAM COMPLETE: CPT

## 2025-06-16 PROCEDURE — G2211 COMPLEX E/M VISIT ADD ON: CPT

## 2025-06-16 PROCEDURE — 99214 OFFICE O/P EST MOD 30 MIN: CPT

## 2025-07-01 ENCOUNTER — APPOINTMENT (OUTPATIENT)
Dept: CARDIOLOGY | Facility: CLINIC | Age: 80
End: 2025-07-01

## 2025-07-29 ENCOUNTER — APPOINTMENT (OUTPATIENT)
Dept: INTERNAL MEDICINE | Facility: CLINIC | Age: 80
End: 2025-07-29
Payer: MEDICARE

## 2025-07-29 VITALS
HEART RATE: 52 BPM | WEIGHT: 162 LBS | TEMPERATURE: 97.9 F | HEIGHT: 69.5 IN | BODY MASS INDEX: 23.45 KG/M2 | OXYGEN SATURATION: 99 %

## 2025-07-29 DIAGNOSIS — R29.3 ABNORMAL POSTURE: ICD-10-CM

## 2025-07-29 DIAGNOSIS — Z01.810 ENCOUNTER FOR PREPROCEDURAL CARDIOVASCULAR EXAMINATION: ICD-10-CM

## 2025-07-29 DIAGNOSIS — D50.9 IRON DEFICIENCY ANEMIA, UNSPECIFIED: ICD-10-CM

## 2025-07-29 DIAGNOSIS — E78.5 HYPERLIPIDEMIA, UNSPECIFIED: ICD-10-CM

## 2025-07-29 DIAGNOSIS — Z00.00 ENCOUNTER FOR GENERAL ADULT MEDICAL EXAMINATION W/OUT ABNORMAL FINDINGS: ICD-10-CM

## 2025-07-29 DIAGNOSIS — E87.1 HYPO-OSMOLALITY AND HYPONATREMIA: ICD-10-CM

## 2025-07-29 DIAGNOSIS — R73.09 OTHER ABNORMAL GLUCOSE: ICD-10-CM

## 2025-07-29 DIAGNOSIS — R06.6 HICCOUGH: ICD-10-CM

## 2025-07-29 DIAGNOSIS — I95.1 ORTHOSTATIC HYPOTENSION: ICD-10-CM

## 2025-07-29 DIAGNOSIS — Z23 ENCOUNTER FOR IMMUNIZATION: ICD-10-CM

## 2025-07-29 DIAGNOSIS — Z01.818 ENCOUNTER FOR OTHER PREPROCEDURAL EXAMINATION: ICD-10-CM

## 2025-07-29 DIAGNOSIS — S86.919A STRAIN OF UNSPECIFIED MUSCLE(S) AND TENDON(S) AT LOWER LEG LEVEL, UNSPECIFIED LEG, INITIAL ENCOUNTER: ICD-10-CM

## 2025-07-29 DIAGNOSIS — N40.1 BENIGN PROSTATIC HYPERPLASIA WITH LOWER URINARY TRACT SYMPMS: ICD-10-CM

## 2025-07-29 DIAGNOSIS — I25.10 ATHEROSCLEROTIC HEART DISEASE OF NATIVE CORONARY ARTERY W/OUT ANGINA PECTORIS: ICD-10-CM

## 2025-07-29 PROCEDURE — G0439: CPT

## 2025-08-02 VITALS — DIASTOLIC BLOOD PRESSURE: 80 MMHG | SYSTOLIC BLOOD PRESSURE: 132 MMHG

## 2025-08-18 ENCOUNTER — EMERGENCY (EMERGENCY)
Facility: HOSPITAL | Age: 80
LOS: 1 days | End: 2025-08-18
Attending: STUDENT IN AN ORGANIZED HEALTH CARE EDUCATION/TRAINING PROGRAM
Payer: MEDICARE

## 2025-08-18 VITALS
DIASTOLIC BLOOD PRESSURE: 85 MMHG | WEIGHT: 160.06 LBS | OXYGEN SATURATION: 98 % | HEART RATE: 50 BPM | TEMPERATURE: 98 F | SYSTOLIC BLOOD PRESSURE: 176 MMHG | HEIGHT: 69 IN | RESPIRATION RATE: 16 BRPM

## 2025-08-18 VITALS
DIASTOLIC BLOOD PRESSURE: 90 MMHG | HEART RATE: 50 BPM | TEMPERATURE: 98 F | SYSTOLIC BLOOD PRESSURE: 168 MMHG | OXYGEN SATURATION: 99 % | RESPIRATION RATE: 18 BRPM

## 2025-08-18 DIAGNOSIS — Z98.890 OTHER SPECIFIED POSTPROCEDURAL STATES: Chronic | ICD-10-CM

## 2025-08-18 DIAGNOSIS — Z87.09 PERSONAL HISTORY OF OTHER DISEASES OF THE RESPIRATORY SYSTEM: Chronic | ICD-10-CM

## 2025-08-18 PROCEDURE — 70486 CT MAXILLOFACIAL W/O DYE: CPT | Mod: 26

## 2025-08-18 PROCEDURE — 70450 CT HEAD/BRAIN W/O DYE: CPT

## 2025-08-18 PROCEDURE — 12011 RPR F/E/E/N/L/M 2.5 CM/<: CPT | Mod: GC

## 2025-08-18 PROCEDURE — 90715 TDAP VACCINE 7 YRS/> IM: CPT

## 2025-08-18 PROCEDURE — 70450 CT HEAD/BRAIN W/O DYE: CPT | Mod: 26

## 2025-08-18 PROCEDURE — 99284 EMERGENCY DEPT VISIT MOD MDM: CPT | Mod: 25,GC

## 2025-08-18 PROCEDURE — 76377 3D RENDER W/INTRP POSTPROCES: CPT

## 2025-08-18 PROCEDURE — 99284 EMERGENCY DEPT VISIT MOD MDM: CPT | Mod: 25

## 2025-08-18 PROCEDURE — 71045 X-RAY EXAM CHEST 1 VIEW: CPT

## 2025-08-18 PROCEDURE — 72170 X-RAY EXAM OF PELVIS: CPT | Mod: 26

## 2025-08-18 PROCEDURE — 71045 X-RAY EXAM CHEST 1 VIEW: CPT | Mod: 26

## 2025-08-18 PROCEDURE — 90471 IMMUNIZATION ADMIN: CPT

## 2025-08-18 PROCEDURE — 72125 CT NECK SPINE W/O DYE: CPT

## 2025-08-18 PROCEDURE — 72170 X-RAY EXAM OF PELVIS: CPT

## 2025-08-18 PROCEDURE — 12011 RPR F/E/E/N/L/M 2.5 CM/<: CPT

## 2025-08-18 PROCEDURE — 76377 3D RENDER W/INTRP POSTPROCES: CPT | Mod: 26

## 2025-08-18 PROCEDURE — 70486 CT MAXILLOFACIAL W/O DYE: CPT

## 2025-08-18 PROCEDURE — 72125 CT NECK SPINE W/O DYE: CPT | Mod: 26

## 2025-08-18 RX ORDER — ACETAMINOPHEN 500 MG/5ML
975 LIQUID (ML) ORAL ONCE
Refills: 0 | Status: COMPLETED | OUTPATIENT
Start: 2025-08-18 | End: 2025-08-18

## 2025-08-18 RX ORDER — BACITRACIN 500 UNIT/G
1 OINTMENT (GRAM) TOPICAL ONCE
Refills: 0 | Status: COMPLETED | OUTPATIENT
Start: 2025-08-18 | End: 2025-08-18

## 2025-08-18 RX ORDER — LIDOCAINE HCL/PF 10 MG/ML
10 VIAL (ML) INJECTION ONCE
Refills: 0 | Status: COMPLETED | OUTPATIENT
Start: 2025-08-18 | End: 2025-08-18

## 2025-08-18 RX ADMIN — Medication 975 MILLIGRAM(S): at 19:51

## 2025-08-18 RX ADMIN — Medication 10 MILLILITER(S): at 19:55

## 2025-08-18 RX ADMIN — Medication 1 APPLICATION(S): at 19:55

## 2025-08-20 ENCOUNTER — APPOINTMENT (OUTPATIENT)
Dept: INTERNAL MEDICINE | Facility: CLINIC | Age: 80
End: 2025-08-20

## 2025-08-20 VITALS
HEIGHT: 69 IN | OXYGEN SATURATION: 98 % | DIASTOLIC BLOOD PRESSURE: 84 MMHG | TEMPERATURE: 97.5 F | BODY MASS INDEX: 24.29 KG/M2 | HEART RATE: 52 BPM | SYSTOLIC BLOOD PRESSURE: 122 MMHG | WEIGHT: 164 LBS

## 2025-08-20 DIAGNOSIS — S05.10XA CONTUSION OF EYEBALL AND ORBITAL TISSUES, UNSPECIFIED EYE, INITIAL ENCOUNTER: ICD-10-CM

## 2025-08-20 DIAGNOSIS — S01.81XA LACERATION W/OUT FOREIGN BODY OF OTHER PART OF HEAD, INITIAL ENCOUNTER: ICD-10-CM

## 2025-08-20 DIAGNOSIS — Z91.81 HISTORY OF FALLING: ICD-10-CM

## 2025-08-20 PROCEDURE — 99215 OFFICE O/P EST HI 40 MIN: CPT

## 2025-08-21 ENCOUNTER — APPOINTMENT (OUTPATIENT)
Dept: ORTHOPEDIC SURGERY | Facility: CLINIC | Age: 80
End: 2025-08-21
Payer: MEDICARE

## 2025-08-21 VITALS — BODY MASS INDEX: 24.29 KG/M2 | HEIGHT: 69 IN | WEIGHT: 164 LBS

## 2025-08-21 DIAGNOSIS — M17.0 BILATERAL PRIMARY OSTEOARTHRITIS OF KNEE: ICD-10-CM

## 2025-08-21 PROCEDURE — 20610 DRAIN/INJ JOINT/BURSA W/O US: CPT | Mod: RT

## 2025-08-21 PROCEDURE — 99214 OFFICE O/P EST MOD 30 MIN: CPT | Mod: 25

## 2025-08-29 ENCOUNTER — APPOINTMENT (OUTPATIENT)
Dept: CT IMAGING | Facility: CLINIC | Age: 80
End: 2025-08-29
Payer: MEDICARE

## 2025-08-29 ENCOUNTER — OUTPATIENT (OUTPATIENT)
Dept: OUTPATIENT SERVICES | Facility: HOSPITAL | Age: 80
LOS: 1 days | End: 2025-08-29
Payer: MEDICARE

## 2025-08-29 ENCOUNTER — APPOINTMENT (OUTPATIENT)
Dept: INTERNAL MEDICINE | Facility: CLINIC | Age: 80
End: 2025-08-29
Payer: MEDICARE

## 2025-08-29 VITALS
WEIGHT: 162 LBS | HEART RATE: 58 BPM | TEMPERATURE: 98.3 F | OXYGEN SATURATION: 98 % | HEIGHT: 69 IN | BODY MASS INDEX: 23.99 KG/M2

## 2025-08-29 VITALS — SYSTOLIC BLOOD PRESSURE: 110 MMHG | DIASTOLIC BLOOD PRESSURE: 65 MMHG

## 2025-08-29 DIAGNOSIS — Z98.890 OTHER SPECIFIED POSTPROCEDURAL STATES: Chronic | ICD-10-CM

## 2025-08-29 DIAGNOSIS — Z87.09 PERSONAL HISTORY OF OTHER DISEASES OF THE RESPIRATORY SYSTEM: Chronic | ICD-10-CM

## 2025-08-29 DIAGNOSIS — R51.9 HEADACHE, UNSPECIFIED: ICD-10-CM

## 2025-08-29 PROCEDURE — 70450 CT HEAD/BRAIN W/O DYE: CPT | Mod: 26

## 2025-08-29 PROCEDURE — G2211 COMPLEX E/M VISIT ADD ON: CPT

## 2025-08-29 PROCEDURE — 99213 OFFICE O/P EST LOW 20 MIN: CPT

## 2025-08-29 PROCEDURE — 70450 CT HEAD/BRAIN W/O DYE: CPT

## 2025-09-06 ENCOUNTER — TRANSCRIPTION ENCOUNTER (OUTPATIENT)
Age: 80
End: 2025-09-06

## 2025-09-17 ENCOUNTER — NON-APPOINTMENT (OUTPATIENT)
Age: 80
End: 2025-09-17